# Patient Record
Sex: FEMALE | Race: WHITE | NOT HISPANIC OR LATINO | Employment: UNEMPLOYED | URBAN - NONMETROPOLITAN AREA
[De-identification: names, ages, dates, MRNs, and addresses within clinical notes are randomized per-mention and may not be internally consistent; named-entity substitution may affect disease eponyms.]

---

## 2019-02-25 ENCOUNTER — OFFICE VISIT (OUTPATIENT)
Dept: RETAIL CLINIC | Facility: CLINIC | Age: 64
End: 2019-02-25

## 2019-02-25 VITALS
TEMPERATURE: 99.3 F | RESPIRATION RATE: 16 BRPM | HEART RATE: 92 BPM | WEIGHT: 246.4 LBS | OXYGEN SATURATION: 94 % | SYSTOLIC BLOOD PRESSURE: 130 MMHG | DIASTOLIC BLOOD PRESSURE: 78 MMHG

## 2019-02-25 DIAGNOSIS — J06.9 UPPER RESPIRATORY TRACT INFECTION, UNSPECIFIED TYPE: ICD-10-CM

## 2019-02-25 DIAGNOSIS — J44.0 COPD WITH ACUTE BRONCHITIS (HCC): Primary | ICD-10-CM

## 2019-02-25 DIAGNOSIS — J20.9 COPD WITH ACUTE BRONCHITIS (HCC): Primary | ICD-10-CM

## 2019-02-25 LAB
EXPIRATION DATE: NORMAL
FLUAV AG NPH QL: NEGATIVE
FLUBV AG NPH QL: NEGATIVE
INTERNAL CONTROL: NORMAL
Lab: NORMAL

## 2019-02-25 PROCEDURE — 87804 INFLUENZA ASSAY W/OPTIC: CPT | Performed by: NURSE PRACTITIONER

## 2019-02-25 PROCEDURE — 96372 THER/PROPH/DIAG INJ SC/IM: CPT | Performed by: NURSE PRACTITIONER

## 2019-02-25 PROCEDURE — 94640 AIRWAY INHALATION TREATMENT: CPT | Performed by: NURSE PRACTITIONER

## 2019-02-25 PROCEDURE — 99204 OFFICE O/P NEW MOD 45 MIN: CPT | Performed by: NURSE PRACTITIONER

## 2019-02-25 RX ORDER — PREDNISONE 10 MG/1
20 TABLET ORAL 2 TIMES DAILY
Qty: 20 TABLET | Refills: 5 | Status: ON HOLD | OUTPATIENT
Start: 2019-02-25 | End: 2020-12-22

## 2019-02-25 RX ORDER — METHYLPREDNISOLONE ACETATE 80 MG/ML
80 INJECTION, SUSPENSION INTRA-ARTICULAR; INTRALESIONAL; INTRAMUSCULAR; SOFT TISSUE ONCE
Status: COMPLETED | OUTPATIENT
Start: 2019-02-25 | End: 2019-02-25

## 2019-02-25 RX ORDER — BROMPHENIRAMINE MALEATE, PSEUDOEPHEDRINE HYDROCHLORIDE, AND DEXTROMETHORPHAN HYDROBROMIDE 2; 30; 10 MG/5ML; MG/5ML; MG/5ML
10 SYRUP ORAL 3 TIMES DAILY PRN
Qty: 180 ML | Refills: 0 | Status: ON HOLD | OUTPATIENT
Start: 2019-02-25 | End: 2020-12-22

## 2019-02-25 RX ORDER — ALBUTEROL SULFATE 2.5 MG/3ML
2.5 SOLUTION RESPIRATORY (INHALATION) ONCE
Status: COMPLETED | OUTPATIENT
Start: 2019-02-25 | End: 2019-02-25

## 2019-02-25 RX ORDER — DEXTROMETHORPHAN HYDROBROMIDE AND PROMETHAZINE HYDROCHLORIDE 15; 6.25 MG/5ML; MG/5ML
5 SYRUP ORAL
Qty: 150 ML | Refills: 0 | Status: ON HOLD | OUTPATIENT
Start: 2019-02-25 | End: 2020-12-22

## 2019-02-25 RX ADMIN — METHYLPREDNISOLONE ACETATE 80 MG: 80 INJECTION, SUSPENSION INTRA-ARTICULAR; INTRALESIONAL; INTRAMUSCULAR; SOFT TISSUE at 17:22

## 2019-02-25 RX ADMIN — ALBUTEROL SULFATE 2.5 MG: 2.5 SOLUTION RESPIRATORY (INHALATION) at 17:49

## 2019-02-25 NOTE — PROGRESS NOTES
Leisa presents to the clinic today c/o upper respiratory/Flu like symptoms  which started four days ago. Associated symptoms include congestion, cough with associated wheezing,fatigue, body aches,  decreased appetite and fever/chills for the past 48 hours. She has tried several otc medications without adequate relief. She does work as a Radiology Tech and has had her Influenza vaccination this season.     URI    This is a new problem. The current episode started in the past 7 days. The problem has been gradually worsening. The maximum temperature recorded prior to her arrival was 100.4 - 100.9 F. The fever has been present for 1 to 2 days. Associated symptoms include congestion, coughing, headaches, rhinorrhea and wheezing. Pertinent negatives include no diarrhea, ear pain, nausea, plugged ear sensation, rash, sinus pain, sore throat, swollen glands or vomiting. She has tried inhaler use (OTC Cold/Cough) for the symptoms.    Refer to ROS for additional information.    Vitals:    02/25/19 1715   BP: 130/78   Pulse: 92   Resp: 16   Temp: 99.3 °F (37.4 °C)   SpO2: 94%     The following portions of the patient's history were reviewed and updated as appropriate: allergies, current medications, past family history, past medical history, past social history, past surgical history and problem list.    Review of Systems   Constitutional: Positive for activity change, appetite change, chills, fatigue and fever.   HENT: Positive for congestion and rhinorrhea. Negative for ear pain, sinus pain and sore throat.    Eyes: Negative for discharge and redness.   Respiratory: Positive for cough, chest tightness, shortness of breath and wheezing.    Gastrointestinal: Negative for diarrhea, nausea and vomiting.   Genitourinary: Negative for decreased urine volume.   Musculoskeletal: Positive for myalgias.        Generalized aching and joint discomfort   Skin: Negative for rash.   Neurological: Positive for headaches. Negative for  dizziness and light-headedness.   Hematological: Negative for adenopathy.   Psychiatric/Behavioral: Positive for sleep disturbance.   All other systems reviewed and are negative.    Physical Exam   Constitutional: She is oriented to person, place, and time. She appears well-developed and well-nourished. No distress.   HENT:   Head: Normocephalic.   Right Ear: Ear canal normal. Tympanic membrane is injected.   Left Ear: Ear canal normal. Tympanic membrane is injected.   Nose: Mucosal edema and rhinorrhea present. Right sinus exhibits no maxillary sinus tenderness and no frontal sinus tenderness. Left sinus exhibits no maxillary sinus tenderness and no frontal sinus tenderness.   Mouth/Throat: Mucous membranes are normal. No oropharyngeal exudate or posterior oropharyngeal erythema.   Eyes: Conjunctivae are normal. Pupils are equal, round, and reactive to light. Right eye exhibits discharge. Left eye exhibits no discharge. No scleral icterus.   Neck: Neck supple.   Cardiovascular: Normal rate, regular rhythm and normal heart sounds.   Pulmonary/Chest: Effort normal. No accessory muscle usage or stridor. No respiratory distress. She has wheezes. She has rhonchi. She exhibits tenderness.   Abdominal: Soft. There is no tenderness. There is no guarding and no CVA tenderness.   Lymphadenopathy:     She has no cervical adenopathy.   Neurological: She is alert and oriented to person, place, and time.   Skin: Skin is warm and dry. Capillary refill takes less than 2 seconds.   Psychiatric: She has a normal mood and affect. Her behavior is normal.   Vitals reviewed.    Assessment/Plan   Problems Addressed this Visit     None      Visit Diagnoses     COPD with acute bronchitis (CMS/[facility])    -  Primary    Relevant Medications    methylPREDNISolone acetate (DEPO-medrol) injection 80 mg (Completed)    predniSONE (DELTASONE) 10 MG tablet    promethazine-dextromethorphan (PROMETHAZINE-DM) 6.25-15 MG/5ML syrup     brompheniramine-pseudoephedrine-DM 30-2-10 MG/5ML syrup    albuterol (PROVENTIL) nebulizer solution 0.083% 2.5 mg/3mL (Completed)    Upper respiratory tract infection, unspecified type        Relevant Orders    POC Influenza A / B (Completed)        Findings and recommendations discussed with Leisa. Treatment options reviewed. Counseled regarding supportive care measures and smoking cessation. S/S of concern reviewed and if occur to seek further medical evaluation or if symptoms do not improve within 48-72 hours.   Lab Results   Component Value Date    RAPFLUA Negative 02/25/2019    RAPFLUB Negative 02/25/2019

## 2020-12-15 ENCOUNTER — OFFICE VISIT (OUTPATIENT)
Dept: NEUROSURGERY | Facility: CLINIC | Age: 65
End: 2020-12-15

## 2020-12-15 VITALS — HEIGHT: 67 IN | WEIGHT: 250.2 LBS | BODY MASS INDEX: 39.27 KG/M2 | TEMPERATURE: 97.8 F

## 2020-12-15 DIAGNOSIS — M54.9 MECHANICAL BACK PAIN: Primary | ICD-10-CM

## 2020-12-15 DIAGNOSIS — M47.819 FACET ARTHROPATHY: ICD-10-CM

## 2020-12-15 DIAGNOSIS — M51.36 DDD (DEGENERATIVE DISC DISEASE), LUMBAR: ICD-10-CM

## 2020-12-15 PROCEDURE — 99243 OFF/OP CNSLTJ NEW/EST LOW 30: CPT | Performed by: NEUROLOGICAL SURGERY

## 2020-12-15 RX ORDER — CITALOPRAM 20 MG/1
20 TABLET ORAL DAILY
COMMUNITY

## 2020-12-15 RX ORDER — ATORVASTATIN CALCIUM 80 MG/1
80 TABLET, FILM COATED ORAL DAILY
COMMUNITY

## 2020-12-15 RX ORDER — OMEPRAZOLE 20 MG/1
20 CAPSULE, DELAYED RELEASE ORAL DAILY
COMMUNITY

## 2020-12-15 RX ORDER — ALBUTEROL SULFATE 1.25 MG/3ML
1 SOLUTION RESPIRATORY (INHALATION) EVERY 6 HOURS PRN
Status: ON HOLD | COMMUNITY
End: 2020-12-22

## 2020-12-15 RX ORDER — CARVEDILOL 25 MG/1
25 TABLET ORAL 2 TIMES DAILY WITH MEALS
COMMUNITY

## 2020-12-15 RX ORDER — AMLODIPINE BESYLATE 10 MG/1
10 TABLET ORAL DAILY
COMMUNITY

## 2020-12-15 RX ORDER — ASPIRIN 81 MG/1
81 TABLET, CHEWABLE ORAL DAILY
COMMUNITY

## 2020-12-15 NOTE — PROGRESS NOTES
Patient: Leisa Vasquez  : 1955    Primary Care Provider: Jasper Lopez MD    Requesting Provider: As above        History    Chief Complaint: Mid and low back pain involving the right hip.    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  In May of this year she was moving a heavy patient when she developed right hip pain and dragging of her right foot.  The foot dragging has improved.  The right hip pain is ongoing.  She has started to get some weakness in her left hip.  She does not really describe radicular spread of her symptoms.  She has urinary frequency but no katey bowel or bladder dysfunction.  She has no numbness.  She is worse with standing, walking, bending.  Sitting immobile is helpful.  She has undergone chiropractic treatment.    Review of Systems   Constitutional: Positive for activity change, appetite change, fatigue and unexpected weight change. Negative for chills, diaphoresis and fever.   HENT: Negative for congestion, dental problem, drooling, ear discharge, ear pain, facial swelling, hearing loss, mouth sores, nosebleeds, postnasal drip, rhinorrhea, sinus pressure, sinus pain, sneezing, sore throat, tinnitus, trouble swallowing and voice change.    Eyes: Negative for photophobia, pain, discharge, redness, itching and visual disturbance.   Respiratory: Negative for apnea, cough, choking, chest tightness, shortness of breath, wheezing and stridor.    Cardiovascular: Negative for chest pain, palpitations and leg swelling.   Gastrointestinal: Negative for abdominal distention, abdominal pain, anal bleeding, blood in stool, constipation, diarrhea, nausea, rectal pain and vomiting.   Endocrine: Negative for cold intolerance, heat intolerance, polydipsia, polyphagia and polyuria.   Genitourinary: Positive for frequency. Negative for decreased urine volume, difficulty urinating,  "dyspareunia, dysuria, enuresis, flank pain, genital sores, hematuria, menstrual problem, pelvic pain, urgency, vaginal bleeding, vaginal discharge and vaginal pain.   Musculoskeletal: Positive for back pain and gait problem. Negative for arthralgias, joint swelling, myalgias, neck pain and neck stiffness.   Skin: Negative for color change, pallor, rash and wound.   Allergic/Immunologic: Negative for environmental allergies, food allergies and immunocompromised state.   Neurological: Positive for dizziness. Negative for tremors, seizures, syncope, facial asymmetry, speech difficulty, weakness, light-headedness, numbness and headaches.   Hematological: Negative for adenopathy. Does not bruise/bleed easily.   Psychiatric/Behavioral: Negative for agitation, behavioral problems, confusion, decreased concentration, dysphoric mood, hallucinations, self-injury, sleep disturbance and suicidal ideas. The patient is not nervous/anxious and is not hyperactive.        The patient's past medical history, past surgical history, family history, and social history have been reviewed at length in the electronic medical record.    Physical Exam:   Temp 97.8 °F (36.6 °C)   Ht 170.2 cm (67\")   Wt 113 kg (250 lb 3.2 oz)   BMI 39.19 kg/m²   CONSTITUTIONAL: Patient is well-nourished, pleasant and appears stated age.  CV: Heart regular rate and rhythm without murmur, rub, or gallop.  PULMONARY: Lungs are clear to ascultation.  MUSCULOSKELETAL:  Straight leg raising is negative.  Wilfred's Sign is negative on the right and positive on the left.  ROM in back is limited in all directions.  Tenderness in the back to palpation is present several fingerbreadths right of the midline in the lumbar region.  NEUROLOGICAL:  Orientation, memory, attention span, language function, and cognition have been examined and are intact.  Strength is intact in the lower extremities to direct testing.  Muscle tone is normal throughout.  Station and gait are " normal.  Sensation is intact to light touch testing throughout.  Deep tendon reflexes are 1+ and symmetrical.  Coordination is intact.      Medical Decision Making    Data Review:   MRI of the lumbar spine dated 6/5/2020 demonstrates some diffuse degenerative disc disease and facet arthropathy.  Modic endplate changes are noted at L1-2.  There is moderate stenosis at L1-2.  Recess narrowing is noted at other levels.    Right hip x-rays were said to show some osteoarthritis.    Diagnosis:   1.  Mechanical back pain.  2.  Right hip pain of uncertain etiology.  3.  Morbid obesity.    Treatment Options:   The patient has mechanical low back pain due to extensive degenerative disc disease and degenerative joint disease.  Currently, I do not see a role for surgical intervention on her back and treatment will need to remain symptomatic.       Diagnosis Plan   1. Mechanical back pain     2. Facet arthropathy     3. DDD (degenerative disc disease), lumbar         Scribed for Francis Ricketts MD by SERA De La Paz 12/15/2020 14:27 EST      I, Dr. Ricketts, personally performed the services described in the documentation, as scribed in my presence, and it is both accurate and complete.

## 2020-12-16 ENCOUNTER — TELEPHONE (OUTPATIENT)
Dept: NEUROSURGERY | Facility: CLINIC | Age: 65
End: 2020-12-16

## 2020-12-16 NOTE — TELEPHONE ENCOUNTER
"Provider:  Jamarcus  Caller: self  Time of call:  09:41   Phone #:  [phone]  Surgery:  none  Surgery Date:  none  Last visit:   12/15/2020  Next visit: none    Reason for call:       Patient left message in regards to her appointment yesterday with Dr Ricketts.  She reports that when Dr Ricketts asked if she had any bowel or bladder problems she responded \"no\" out of embarrassment.    In her message she stated stated that she actually has had bowel/bladder problems for the last couple of months.  She wanted to make Dr Ricketts aware.           "

## 2020-12-16 NOTE — TELEPHONE ENCOUNTER
I called and spoke with patient.  She is describing urinary urgency.  She feels the urge to urinate, however does not make it to the restroom in time.  This has been going on for approximately 6-7 months and she wears a depends daily.  She has been embarrassed about the situation and will seek out medical advice from her PCP's office.

## 2020-12-21 ENCOUNTER — HOSPITAL ENCOUNTER (OUTPATIENT)
Facility: HOSPITAL | Age: 65
Setting detail: OBSERVATION
Discharge: HOME | End: 2020-12-23
Attending: STUDENT IN AN ORGANIZED HEALTH CARE EDUCATION/TRAINING PROGRAM | Admitting: INTERNAL MEDICINE

## 2020-12-21 ENCOUNTER — APPOINTMENT (OUTPATIENT)
Dept: GENERAL RADIOLOGY | Facility: HOSPITAL | Age: 65
End: 2020-12-21

## 2020-12-21 DIAGNOSIS — I26.99 PULMONARY EMBOLISM, UNSPECIFIED CHRONICITY, UNSPECIFIED PULMONARY EMBOLISM TYPE, UNSPECIFIED WHETHER ACUTE COR PULMONALE PRESENT (HCC): Primary | ICD-10-CM

## 2020-12-21 LAB
A-A DO2: 39.1 MMHG (ref 0–300)
ALBUMIN SERPL-MCNC: 3.43 G/DL (ref 3.5–5.2)
ALBUMIN/GLOB SERPL: 0.9 G/DL
ALP SERPL-CCNC: 92 U/L (ref 39–117)
ALT SERPL W P-5'-P-CCNC: 17 U/L (ref 1–33)
ANION GAP SERPL CALCULATED.3IONS-SCNC: 9.7 MMOL/L (ref 5–15)
ARTERIAL PATENCY WRIST A: ABNORMAL
AST SERPL-CCNC: 15 U/L (ref 1–32)
ATMOSPHERIC PRESS: 726 MMHG
BASE EXCESS BLDA CALC-SCNC: 1.8 MMOL/L (ref 0–2)
BASOPHILS # BLD AUTO: 0.09 10*3/MM3 (ref 0–0.2)
BASOPHILS NFR BLD AUTO: 0.6 % (ref 0–1.5)
BDY SITE: ABNORMAL
BILIRUB SERPL-MCNC: 1.3 MG/DL (ref 0–1.2)
BILIRUB UR QL STRIP: NEGATIVE
BODY TEMPERATURE: 0 C
BUN SERPL-MCNC: 22 MG/DL (ref 8–23)
BUN/CREAT SERPL: 31 (ref 7–25)
CALCIUM SPEC-SCNC: 9.6 MG/DL (ref 8.6–10.5)
CHLORIDE SERPL-SCNC: 101 MMOL/L (ref 98–107)
CK SERPL-CCNC: 63 U/L (ref 20–180)
CLARITY UR: CLEAR
CO2 BLDA-SCNC: 28.2 MMOL/L (ref 22–33)
CO2 SERPL-SCNC: 22.3 MMOL/L (ref 22–29)
COHGB MFR BLD: 2.7 % (ref 0–5)
COLOR UR: ABNORMAL
CREAT SERPL-MCNC: 0.71 MG/DL (ref 0.57–1)
CRP SERPL-MCNC: 8.83 MG/DL (ref 0–0.5)
D-LACTATE SERPL-SCNC: 1.2 MMOL/L (ref 0.5–2)
DEPRECATED RDW RBC AUTO: 42.4 FL (ref 37–54)
EOSINOPHIL # BLD AUTO: 0.06 10*3/MM3 (ref 0–0.4)
EOSINOPHIL NFR BLD AUTO: 0.4 % (ref 0.3–6.2)
ERYTHROCYTE [DISTWIDTH] IN BLOOD BY AUTOMATED COUNT: 11.9 % (ref 12.3–15.4)
ERYTHROCYTE [SEDIMENTATION RATE] IN BLOOD: 30 MM/HR (ref 0–30)
FLUAV RNA RESP QL NAA+PROBE: NOT DETECTED
FLUBV RNA RESP QL NAA+PROBE: NOT DETECTED
GFR SERPL CREATININE-BSD FRML MDRD: 83 ML/MIN/1.73
GLOBULIN UR ELPH-MCNC: 3.8 GM/DL
GLUCOSE SERPL-MCNC: 94 MG/DL (ref 65–99)
GLUCOSE UR STRIP-MCNC: NEGATIVE MG/DL
HCO3 BLDA-SCNC: 26.9 MMOL/L (ref 20–26)
HCT VFR BLD AUTO: 43.3 % (ref 34–46.6)
HCT VFR BLD CALC: 41.9 % (ref 38–51)
HGB BLD-MCNC: 14 G/DL (ref 12–15.9)
HGB BLDA-MCNC: 13.7 G/DL (ref 13.5–17.5)
HGB UR QL STRIP.AUTO: NEGATIVE
IMM GRANULOCYTES # BLD AUTO: 0.06 10*3/MM3 (ref 0–0.05)
IMM GRANULOCYTES NFR BLD AUTO: 0.4 % (ref 0–0.5)
INHALED O2 CONCENTRATION: 21 %
INR PPP: 1.01 (ref 0.9–1.1)
KETONES UR QL STRIP: ABNORMAL
LEUKOCYTE ESTERASE UR QL STRIP.AUTO: NEGATIVE
LIPASE SERPL-CCNC: 18 U/L (ref 13–60)
LYMPHOCYTES # BLD AUTO: 3.75 10*3/MM3 (ref 0.7–3.1)
LYMPHOCYTES NFR BLD AUTO: 23.4 % (ref 19.6–45.3)
Lab: ABNORMAL
MAGNESIUM SERPL-MCNC: 1.6 MG/DL (ref 1.6–2.4)
MCH RBC QN AUTO: 31.2 PG (ref 26.6–33)
MCHC RBC AUTO-ENTMCNC: 32.3 G/DL (ref 31.5–35.7)
MCV RBC AUTO: 96.4 FL (ref 79–97)
METHGB BLD QL: 0.1 % (ref 0–3)
MODALITY: ABNORMAL
MONOCYTES # BLD AUTO: 1.52 10*3/MM3 (ref 0.1–0.9)
MONOCYTES NFR BLD AUTO: 9.5 % (ref 5–12)
NEUTROPHILS NFR BLD AUTO: 10.55 10*3/MM3 (ref 1.7–7)
NEUTROPHILS NFR BLD AUTO: 65.7 % (ref 42.7–76)
NITRITE UR QL STRIP: NEGATIVE
NOTE: ABNORMAL
NRBC BLD AUTO-RTO: 0 /100 WBC (ref 0–0.2)
NT-PROBNP SERPL-MCNC: 77.9 PG/ML (ref 0–900)
OXYHGB MFR BLDV: 87 % (ref 94–99)
PCO2 BLDA: 42.8 MM HG (ref 35–45)
PCO2 TEMP ADJ BLD: ABNORMAL MM[HG]
PH BLDA: 7.41 PH UNITS (ref 7.35–7.45)
PH UR STRIP.AUTO: <=5 [PH] (ref 5–8)
PH, TEMP CORRECTED: ABNORMAL
PHOSPHATE SERPL-MCNC: 3.1 MG/DL (ref 2.5–4.5)
PLATELET # BLD AUTO: 184 10*3/MM3 (ref 140–450)
PMV BLD AUTO: 10.4 FL (ref 6–12)
PO2 BLDA: 55.2 MM HG (ref 83–108)
PO2 TEMP ADJ BLD: ABNORMAL MM[HG]
POTASSIUM SERPL-SCNC: 3.8 MMOL/L (ref 3.5–5.2)
PROT SERPL-MCNC: 7.2 G/DL (ref 6–8.5)
PROT UR QL STRIP: ABNORMAL
PROTHROMBIN TIME: 13.1 SECONDS (ref 11.9–14.1)
RBC # BLD AUTO: 4.49 10*6/MM3 (ref 3.77–5.28)
SAO2 % BLDCOA: 89.5 % (ref 94–99)
SARS-COV-2 RNA RESP QL NAA+PROBE: NOT DETECTED
SODIUM SERPL-SCNC: 133 MMOL/L (ref 136–145)
SP GR UR STRIP: 1.02 (ref 1–1.03)
TROPONIN T SERPL-MCNC: <0.01 NG/ML (ref 0–0.03)
TSH SERPL DL<=0.05 MIU/L-ACNC: 2.57 UIU/ML (ref 0.27–4.2)
UROBILINOGEN UR QL STRIP: ABNORMAL
VENTILATOR MODE: ABNORMAL
WBC # BLD AUTO: 16.03 10*3/MM3 (ref 3.4–10.8)

## 2020-12-21 PROCEDURE — 84100 ASSAY OF PHOSPHORUS: CPT | Performed by: STUDENT IN AN ORGANIZED HEALTH CARE EDUCATION/TRAINING PROGRAM

## 2020-12-21 PROCEDURE — 85652 RBC SED RATE AUTOMATED: CPT | Performed by: STUDENT IN AN ORGANIZED HEALTH CARE EDUCATION/TRAINING PROGRAM

## 2020-12-21 PROCEDURE — 96361 HYDRATE IV INFUSION ADD-ON: CPT

## 2020-12-21 PROCEDURE — 82805 BLOOD GASES W/O2 SATURATION: CPT

## 2020-12-21 PROCEDURE — 82375 ASSAY CARBOXYHB QUANT: CPT

## 2020-12-21 PROCEDURE — 99284 EMERGENCY DEPT VISIT MOD MDM: CPT

## 2020-12-21 PROCEDURE — 84443 ASSAY THYROID STIM HORMONE: CPT | Performed by: STUDENT IN AN ORGANIZED HEALTH CARE EDUCATION/TRAINING PROGRAM

## 2020-12-21 PROCEDURE — 93005 ELECTROCARDIOGRAM TRACING: CPT | Performed by: STUDENT IN AN ORGANIZED HEALTH CARE EDUCATION/TRAINING PROGRAM

## 2020-12-21 PROCEDURE — 83605 ASSAY OF LACTIC ACID: CPT | Performed by: STUDENT IN AN ORGANIZED HEALTH CARE EDUCATION/TRAINING PROGRAM

## 2020-12-21 PROCEDURE — 71045 X-RAY EXAM CHEST 1 VIEW: CPT

## 2020-12-21 PROCEDURE — 83036 HEMOGLOBIN GLYCOSYLATED A1C: CPT | Performed by: HOSPITALIST

## 2020-12-21 PROCEDURE — 96375 TX/PRO/DX INJ NEW DRUG ADDON: CPT

## 2020-12-21 PROCEDURE — 81003 URINALYSIS AUTO W/O SCOPE: CPT | Performed by: STUDENT IN AN ORGANIZED HEALTH CARE EDUCATION/TRAINING PROGRAM

## 2020-12-21 PROCEDURE — 85610 PROTHROMBIN TIME: CPT | Performed by: STUDENT IN AN ORGANIZED HEALTH CARE EDUCATION/TRAINING PROGRAM

## 2020-12-21 PROCEDURE — 83735 ASSAY OF MAGNESIUM: CPT | Performed by: STUDENT IN AN ORGANIZED HEALTH CARE EDUCATION/TRAINING PROGRAM

## 2020-12-21 PROCEDURE — 83880 ASSAY OF NATRIURETIC PEPTIDE: CPT | Performed by: STUDENT IN AN ORGANIZED HEALTH CARE EDUCATION/TRAINING PROGRAM

## 2020-12-21 PROCEDURE — C9803 HOPD COVID-19 SPEC COLLECT: HCPCS

## 2020-12-21 PROCEDURE — 82550 ASSAY OF CK (CPK): CPT | Performed by: STUDENT IN AN ORGANIZED HEALTH CARE EDUCATION/TRAINING PROGRAM

## 2020-12-21 PROCEDURE — 80053 COMPREHEN METABOLIC PANEL: CPT | Performed by: STUDENT IN AN ORGANIZED HEALTH CARE EDUCATION/TRAINING PROGRAM

## 2020-12-21 PROCEDURE — 87636 SARSCOV2 & INF A&B AMP PRB: CPT | Performed by: STUDENT IN AN ORGANIZED HEALTH CARE EDUCATION/TRAINING PROGRAM

## 2020-12-21 PROCEDURE — 36600 WITHDRAWAL OF ARTERIAL BLOOD: CPT

## 2020-12-21 PROCEDURE — 93005 ELECTROCARDIOGRAM TRACING: CPT | Performed by: EMERGENCY MEDICINE

## 2020-12-21 PROCEDURE — 93010 ELECTROCARDIOGRAM REPORT: CPT | Performed by: INTERNAL MEDICINE

## 2020-12-21 PROCEDURE — 86140 C-REACTIVE PROTEIN: CPT | Performed by: STUDENT IN AN ORGANIZED HEALTH CARE EDUCATION/TRAINING PROGRAM

## 2020-12-21 PROCEDURE — 84484 ASSAY OF TROPONIN QUANT: CPT | Performed by: STUDENT IN AN ORGANIZED HEALTH CARE EDUCATION/TRAINING PROGRAM

## 2020-12-21 PROCEDURE — 83050 HGB METHEMOGLOBIN QUAN: CPT

## 2020-12-21 PROCEDURE — 83690 ASSAY OF LIPASE: CPT | Performed by: STUDENT IN AN ORGANIZED HEALTH CARE EDUCATION/TRAINING PROGRAM

## 2020-12-21 PROCEDURE — 85025 COMPLETE CBC W/AUTO DIFF WBC: CPT | Performed by: STUDENT IN AN ORGANIZED HEALTH CARE EDUCATION/TRAINING PROGRAM

## 2020-12-21 PROCEDURE — 25010000002 MORPHINE PER 10 MG: Performed by: STUDENT IN AN ORGANIZED HEALTH CARE EDUCATION/TRAINING PROGRAM

## 2020-12-21 RX ORDER — MORPHINE SULFATE 2 MG/ML
2 INJECTION, SOLUTION INTRAMUSCULAR; INTRAVENOUS ONCE
Status: COMPLETED | OUTPATIENT
Start: 2020-12-21 | End: 2020-12-21

## 2020-12-21 RX ORDER — SODIUM CHLORIDE 9 MG/ML
75 INJECTION, SOLUTION INTRAVENOUS CONTINUOUS
Status: DISCONTINUED | OUTPATIENT
Start: 2020-12-21 | End: 2020-12-22

## 2020-12-21 RX ORDER — ASPIRIN 81 MG/1
324 TABLET, CHEWABLE ORAL ONCE
Status: COMPLETED | OUTPATIENT
Start: 2020-12-21 | End: 2020-12-21

## 2020-12-21 RX ORDER — HYDROCODONE BITARTRATE AND ACETAMINOPHEN 5; 325 MG/1; MG/1
1 TABLET ORAL ONCE
Status: COMPLETED | OUTPATIENT
Start: 2020-12-21 | End: 2020-12-21

## 2020-12-21 RX ADMIN — SODIUM CHLORIDE 125 ML/HR: 9 INJECTION, SOLUTION INTRAVENOUS at 21:50

## 2020-12-21 RX ADMIN — ASPIRIN 324 MG: 81 TABLET, CHEWABLE ORAL at 21:45

## 2020-12-21 RX ADMIN — HYDROCODONE BITARTRATE AND ACETAMINOPHEN 1 TABLET: 5; 325 TABLET ORAL at 23:49

## 2020-12-21 RX ADMIN — MORPHINE SULFATE 2 MG: 2 INJECTION, SOLUTION INTRAMUSCULAR; INTRAVENOUS at 21:46

## 2020-12-21 RX ADMIN — SODIUM CHLORIDE 500 ML: 9 INJECTION, SOLUTION INTRAVENOUS at 21:49

## 2020-12-21 NOTE — ED NOTES
PT DENIES ANY FALL OR INJURY, STATES PAIN INCREASES WITH ANY KIND OF MOVEMENT     Whitney Garcia, RN  12/21/20 9643

## 2020-12-22 ENCOUNTER — APPOINTMENT (OUTPATIENT)
Dept: CT IMAGING | Facility: HOSPITAL | Age: 65
End: 2020-12-22

## 2020-12-22 ENCOUNTER — APPOINTMENT (OUTPATIENT)
Dept: CARDIOLOGY | Facility: HOSPITAL | Age: 65
End: 2020-12-22

## 2020-12-22 ENCOUNTER — APPOINTMENT (OUTPATIENT)
Dept: ULTRASOUND IMAGING | Facility: HOSPITAL | Age: 65
End: 2020-12-22

## 2020-12-22 PROBLEM — D75.89 MACROCYTOSIS: Status: ACTIVE | Noted: 2020-12-22

## 2020-12-22 PROBLEM — D72.829 LEUKOCYTOSIS: Status: ACTIVE | Noted: 2020-12-22

## 2020-12-22 PROBLEM — E11.9 T2DM (TYPE 2 DIABETES MELLITUS) (HCC): Chronic | Status: ACTIVE | Noted: 2020-12-22

## 2020-12-22 PROBLEM — Z72.0 TOBACCO ABUSE: Chronic | Status: ACTIVE | Noted: 2020-12-22

## 2020-12-22 PROBLEM — E66.9 OBESITY (BMI 30-39.9): Chronic | Status: ACTIVE | Noted: 2020-12-22

## 2020-12-22 PROBLEM — M19.90 OSTEOARTHRITIS: Chronic | Status: ACTIVE | Noted: 2020-12-22

## 2020-12-22 PROBLEM — M50.30 DEGENERATIVE DISC DISEASE, CERVICAL: Status: ACTIVE | Noted: 2020-12-22

## 2020-12-22 PROBLEM — J96.01 ACUTE RESPIRATORY FAILURE WITH HYPOXIA: Status: ACTIVE | Noted: 2020-12-22

## 2020-12-22 PROBLEM — I25.10 CORONARY ARTERY DISEASE: Chronic | Status: ACTIVE | Noted: 2020-12-22

## 2020-12-22 PROBLEM — J44.9 COPD (CHRONIC OBSTRUCTIVE PULMONARY DISEASE): Chronic | Status: ACTIVE | Noted: 2020-12-22

## 2020-12-22 PROBLEM — I10 ESSENTIAL HYPERTENSION: Chronic | Status: ACTIVE | Noted: 2020-12-22

## 2020-12-22 PROBLEM — I26.99 PULMONARY EMBOLISM (HCC): Status: ACTIVE | Noted: 2020-12-22

## 2020-12-22 PROBLEM — E78.5 HYPERLIPIDEMIA: Chronic | Status: ACTIVE | Noted: 2020-12-22

## 2020-12-22 LAB
A-A DO2: 69.8 MMHG (ref 0–300)
ALBUMIN SERPL-MCNC: 3.5 G/DL (ref 3.5–5.2)
ALBUMIN/GLOB SERPL: 0.9 G/DL
ALP SERPL-CCNC: 100 U/L (ref 39–117)
ALT SERPL W P-5'-P-CCNC: 31 U/L (ref 1–33)
ANION GAP SERPL CALCULATED.3IONS-SCNC: 12.3 MMOL/L (ref 5–15)
ARTERIAL PATENCY WRIST A: ABNORMAL
AST SERPL-CCNC: 29 U/L (ref 1–32)
ATMOSPHERIC PRESS: 728 MMHG
BASE EXCESS BLDA CALC-SCNC: 0.1 MMOL/L (ref 0–2)
BASOPHILS # BLD AUTO: 0.12 10*3/MM3 (ref 0–0.2)
BASOPHILS NFR BLD AUTO: 0.9 % (ref 0–1.5)
BDY SITE: ABNORMAL
BH CV ECHO MEAS - ACS: 1.5 CM
BH CV ECHO MEAS - AO ROOT AREA (BSA CORRECTED): 1.2
BH CV ECHO MEAS - AO ROOT AREA: 5.9 CM^2
BH CV ECHO MEAS - AO ROOT DIAM: 2.8 CM
BH CV ECHO MEAS - BSA(HAYCOCK): 2.3 M^2
BH CV ECHO MEAS - BSA: 2.2 M^2
BH CV ECHO MEAS - BZI_BMI: 38.2 KILOGRAMS/M^2
BH CV ECHO MEAS - BZI_METRIC_HEIGHT: 170.2 CM
BH CV ECHO MEAS - BZI_METRIC_WEIGHT: 110.7 KG
BH CV ECHO MEAS - EDV(MOD-SP4): 33.9 ML
BH CV ECHO MEAS - EF(MOD-SP4): 42.8 %
BH CV ECHO MEAS - ESV(MOD-SP4): 19.4 ML
BH CV ECHO MEAS - LA DIMENSION: 3.9 CM
BH CV ECHO MEAS - LA/AO: 1.4
BH CV ECHO MEAS - LV DIASTOLIC VOL/BSA (35-75): 15.4 ML/M^2
BH CV ECHO MEAS - LV SYSTOLIC VOL/BSA (12-30): 8.8 ML/M^2
BH CV ECHO MEAS - LVLD AP4: 6.9 CM
BH CV ECHO MEAS - LVLS AP4: 6.7 CM
BH CV ECHO MEAS - LVOT AREA (M): 2.3 CM^2
BH CV ECHO MEAS - LVOT AREA: 2.3 CM^2
BH CV ECHO MEAS - LVOT DIAM: 1.7 CM
BH CV ECHO MEAS - MV A MAX VEL: 107 CM/SEC
BH CV ECHO MEAS - MV E MAX VEL: 87 CM/SEC
BH CV ECHO MEAS - MV E/A: 0.81
BH CV ECHO MEAS - PA ACC TIME: 0.09 SEC
BH CV ECHO MEAS - PA PR(ACCEL): 40.8 MMHG
BH CV ECHO MEAS - RAP SYSTOLE: 10 MMHG
BH CV ECHO MEAS - RVSP: 33.6 MMHG
BH CV ECHO MEAS - SI(MOD-SP4): 6.6 ML/M^2
BH CV ECHO MEAS - SV(MOD-SP4): 14.5 ML
BH CV ECHO MEAS - TR MAX VEL: 243 CM/SEC
BILIRUB SERPL-MCNC: 2 MG/DL (ref 0–1.2)
BODY TEMPERATURE: 0 C
BUN SERPL-MCNC: 22 MG/DL (ref 8–23)
BUN/CREAT SERPL: 30.6 (ref 7–25)
CALCIUM SPEC-SCNC: 9.7 MG/DL (ref 8.6–10.5)
CHLORIDE SERPL-SCNC: 99 MMOL/L (ref 98–107)
CO2 BLDA-SCNC: 28.2 MMOL/L (ref 22–33)
CO2 SERPL-SCNC: 24.7 MMOL/L (ref 22–29)
COHGB MFR BLD: 2 % (ref 0–5)
CREAT SERPL-MCNC: 0.72 MG/DL (ref 0.57–1)
CRP SERPL-MCNC: 7.81 MG/DL (ref 0–0.5)
DEPRECATED RDW RBC AUTO: 44.7 FL (ref 37–54)
EOSINOPHIL # BLD AUTO: 0.09 10*3/MM3 (ref 0–0.4)
EOSINOPHIL NFR BLD AUTO: 0.7 % (ref 0.3–6.2)
ERYTHROCYTE [DISTWIDTH] IN BLOOD BY AUTOMATED COUNT: 12 % (ref 12.3–15.4)
GFR SERPL CREATININE-BSD FRML MDRD: 82 ML/MIN/1.73
GLOBULIN UR ELPH-MCNC: 4 GM/DL
GLUCOSE BLDC GLUCOMTR-MCNC: 116 MG/DL (ref 70–130)
GLUCOSE BLDC GLUCOMTR-MCNC: 132 MG/DL (ref 70–130)
GLUCOSE BLDC GLUCOMTR-MCNC: 141 MG/DL (ref 70–130)
GLUCOSE SERPL-MCNC: 108 MG/DL (ref 65–99)
HBA1C MFR BLD: 6.6 % (ref 4.8–5.6)
HCO3 BLDA-SCNC: 26.6 MMOL/L (ref 20–26)
HCT VFR BLD AUTO: 43 % (ref 34–46.6)
HCT VFR BLD CALC: 42 % (ref 38–51)
HGB BLD-MCNC: 13.3 G/DL (ref 12–15.9)
HGB BLDA-MCNC: 13.7 G/DL (ref 13.5–17.5)
IMM GRANULOCYTES # BLD AUTO: 0.06 10*3/MM3 (ref 0–0.05)
IMM GRANULOCYTES NFR BLD AUTO: 0.5 % (ref 0–0.5)
INHALED O2 CONCENTRATION: 28 %
LYMPHOCYTES # BLD AUTO: 2.78 10*3/MM3 (ref 0.7–3.1)
LYMPHOCYTES NFR BLD AUTO: 22 % (ref 19.6–45.3)
Lab: ABNORMAL
MAXIMAL PREDICTED HEART RATE: 157 BPM
MCH RBC QN AUTO: 31 PG (ref 26.6–33)
MCHC RBC AUTO-ENTMCNC: 30.9 G/DL (ref 31.5–35.7)
MCV RBC AUTO: 100.2 FL (ref 79–97)
METHGB BLD QL: 0 % (ref 0–3)
MODALITY: ABNORMAL
MONOCYTES # BLD AUTO: 1.45 10*3/MM3 (ref 0.1–0.9)
MONOCYTES NFR BLD AUTO: 11.5 % (ref 5–12)
NEUTROPHILS NFR BLD AUTO: 64.4 % (ref 42.7–76)
NEUTROPHILS NFR BLD AUTO: 8.15 10*3/MM3 (ref 1.7–7)
NOTE: ABNORMAL
NRBC BLD AUTO-RTO: 0 /100 WBC (ref 0–0.2)
OXYHGB MFR BLDV: 90.5 % (ref 94–99)
PCO2 BLDA: 49.6 MM HG (ref 35–45)
PCO2 TEMP ADJ BLD: ABNORMAL MM[HG]
PH BLDA: 7.34 PH UNITS (ref 7.35–7.45)
PH, TEMP CORRECTED: ABNORMAL
PLATELET # BLD AUTO: 171 10*3/MM3 (ref 140–450)
PMV BLD AUTO: 11 FL (ref 6–12)
PO2 BLDA: 65.6 MM HG (ref 83–108)
PO2 TEMP ADJ BLD: ABNORMAL MM[HG]
POTASSIUM SERPL-SCNC: 3.5 MMOL/L (ref 3.5–5.2)
POTASSIUM SERPL-SCNC: 4.1 MMOL/L (ref 3.5–5.2)
PROT SERPL-MCNC: 7.5 G/DL (ref 6–8.5)
RBC # BLD AUTO: 4.29 10*6/MM3 (ref 3.77–5.28)
SAO2 % BLDCOA: 92.3 % (ref 94–99)
SODIUM SERPL-SCNC: 136 MMOL/L (ref 136–145)
STRESS TARGET HR: 133 BPM
TROPONIN T SERPL-MCNC: <0.01 NG/ML (ref 0–0.03)
VENTILATOR MODE: ABNORMAL
WBC # BLD AUTO: 12.65 10*3/MM3 (ref 3.4–10.8)

## 2020-12-22 PROCEDURE — 93970 EXTREMITY STUDY: CPT | Performed by: RADIOLOGY

## 2020-12-22 PROCEDURE — 96361 HYDRATE IV INFUSION ADD-ON: CPT

## 2020-12-22 PROCEDURE — 85025 COMPLETE CBC W/AUTO DIFF WBC: CPT | Performed by: HOSPITALIST

## 2020-12-22 PROCEDURE — 96365 THER/PROPH/DIAG IV INF INIT: CPT

## 2020-12-22 PROCEDURE — 86140 C-REACTIVE PROTEIN: CPT | Performed by: HOSPITALIST

## 2020-12-22 PROCEDURE — 84484 ASSAY OF TROPONIN QUANT: CPT | Performed by: STUDENT IN AN ORGANIZED HEALTH CARE EDUCATION/TRAINING PROGRAM

## 2020-12-22 PROCEDURE — 96372 THER/PROPH/DIAG INJ SC/IM: CPT

## 2020-12-22 PROCEDURE — G0378 HOSPITAL OBSERVATION PER HR: HCPCS

## 2020-12-22 PROCEDURE — 99220 PR INITIAL OBSERVATION CARE/DAY 70 MINUTES: CPT | Performed by: HOSPITALIST

## 2020-12-22 PROCEDURE — 82805 BLOOD GASES W/O2 SATURATION: CPT

## 2020-12-22 PROCEDURE — 70450 CT HEAD/BRAIN W/O DYE: CPT

## 2020-12-22 PROCEDURE — 25010000002 MAGNESIUM SULFATE IN D5W 1G/100ML (PREMIX) 1-5 GM/100ML-% SOLUTION: Performed by: PHYSICIAN ASSISTANT

## 2020-12-22 PROCEDURE — 94799 UNLISTED PULMONARY SVC/PX: CPT

## 2020-12-22 PROCEDURE — 96376 TX/PRO/DX INJ SAME DRUG ADON: CPT

## 2020-12-22 PROCEDURE — 93306 TTE W/DOPPLER COMPLETE: CPT | Performed by: SPECIALIST

## 2020-12-22 PROCEDURE — 93306 TTE W/DOPPLER COMPLETE: CPT

## 2020-12-22 PROCEDURE — 83050 HGB METHEMOGLOBIN QUAN: CPT

## 2020-12-22 PROCEDURE — 0 IOPAMIDOL PER 1 ML: Performed by: STUDENT IN AN ORGANIZED HEALTH CARE EDUCATION/TRAINING PROGRAM

## 2020-12-22 PROCEDURE — 73200 CT UPPER EXTREMITY W/O DYE: CPT

## 2020-12-22 PROCEDURE — 81241 F5 GENE: CPT | Performed by: PHYSICIAN ASSISTANT

## 2020-12-22 PROCEDURE — 36600 WITHDRAWAL OF ARTERIAL BLOOD: CPT

## 2020-12-22 PROCEDURE — 80053 COMPREHEN METABOLIC PANEL: CPT | Performed by: HOSPITALIST

## 2020-12-22 PROCEDURE — 94640 AIRWAY INHALATION TREATMENT: CPT

## 2020-12-22 PROCEDURE — 71275 CT ANGIOGRAPHY CHEST: CPT

## 2020-12-22 PROCEDURE — 36415 COLL VENOUS BLD VENIPUNCTURE: CPT

## 2020-12-22 PROCEDURE — 84132 ASSAY OF SERUM POTASSIUM: CPT | Performed by: INTERNAL MEDICINE

## 2020-12-22 PROCEDURE — 82962 GLUCOSE BLOOD TEST: CPT

## 2020-12-22 PROCEDURE — 93970 EXTREMITY STUDY: CPT

## 2020-12-22 PROCEDURE — 25010000002 ENOXAPARIN PER 10 MG: Performed by: HOSPITALIST

## 2020-12-22 PROCEDURE — 72125 CT NECK SPINE W/O DYE: CPT

## 2020-12-22 PROCEDURE — 82375 ASSAY CARBOXYHB QUANT: CPT

## 2020-12-22 PROCEDURE — 81240 F2 GENE: CPT | Performed by: PHYSICIAN ASSISTANT

## 2020-12-22 RX ORDER — ASPIRIN 81 MG/1
81 TABLET, CHEWABLE ORAL DAILY
Status: DISCONTINUED | OUTPATIENT
Start: 2020-12-23 | End: 2020-12-23 | Stop reason: HOSPADM

## 2020-12-22 RX ORDER — NITROGLYCERIN 0.4 MG/1
0.4 TABLET SUBLINGUAL
Status: CANCELLED | OUTPATIENT
Start: 2020-12-22

## 2020-12-22 RX ORDER — NICOTINE POLACRILEX 4 MG
15 LOZENGE BUCCAL
Status: DISCONTINUED | OUTPATIENT
Start: 2020-12-22 | End: 2020-12-23 | Stop reason: HOSPADM

## 2020-12-22 RX ORDER — CARVEDILOL 25 MG/1
25 TABLET ORAL 2 TIMES DAILY WITH MEALS
Status: DISCONTINUED | OUTPATIENT
Start: 2020-12-22 | End: 2020-12-23 | Stop reason: HOSPADM

## 2020-12-22 RX ORDER — IBUPROFEN 800 MG/1
800 TABLET ORAL EVERY 8 HOURS PRN
Status: CANCELLED | OUTPATIENT
Start: 2020-12-22

## 2020-12-22 RX ORDER — ATORVASTATIN CALCIUM 40 MG/1
80 TABLET, FILM COATED ORAL NIGHTLY
Status: DISCONTINUED | OUTPATIENT
Start: 2020-12-22 | End: 2020-12-23 | Stop reason: HOSPADM

## 2020-12-22 RX ORDER — ACETAMINOPHEN 325 MG/1
650 TABLET ORAL EVERY 6 HOURS PRN
Status: DISCONTINUED | OUTPATIENT
Start: 2020-12-22 | End: 2020-12-23 | Stop reason: HOSPADM

## 2020-12-22 RX ORDER — IPRATROPIUM BROMIDE AND ALBUTEROL SULFATE 2.5; .5 MG/3ML; MG/3ML
3 SOLUTION RESPIRATORY (INHALATION) EVERY 6 HOURS PRN
COMMUNITY

## 2020-12-22 RX ORDER — NITROGLYCERIN 0.4 MG/1
0.4 TABLET SUBLINGUAL
Status: DISCONTINUED | OUTPATIENT
Start: 2020-12-22 | End: 2020-12-23 | Stop reason: HOSPADM

## 2020-12-22 RX ORDER — IPRATROPIUM BROMIDE AND ALBUTEROL SULFATE 2.5; .5 MG/3ML; MG/3ML
3 SOLUTION RESPIRATORY (INHALATION) EVERY 6 HOURS PRN
Status: CANCELLED | OUTPATIENT
Start: 2020-12-22

## 2020-12-22 RX ORDER — CITALOPRAM 20 MG/1
20 TABLET ORAL DAILY
Status: DISCONTINUED | OUTPATIENT
Start: 2020-12-22 | End: 2020-12-23 | Stop reason: HOSPADM

## 2020-12-22 RX ORDER — IBUPROFEN 800 MG/1
800 TABLET ORAL EVERY 8 HOURS PRN
COMMUNITY
End: 2020-12-23 | Stop reason: HOSPADM

## 2020-12-22 RX ORDER — SODIUM CHLORIDE 0.9 % (FLUSH) 0.9 %
10 SYRINGE (ML) INJECTION EVERY 12 HOURS SCHEDULED
Status: DISCONTINUED | OUTPATIENT
Start: 2020-12-22 | End: 2020-12-23 | Stop reason: HOSPADM

## 2020-12-22 RX ORDER — MORPHINE SULFATE 2 MG/ML
1 INJECTION, SOLUTION INTRAMUSCULAR; INTRAVENOUS EVERY 4 HOURS PRN
Status: DISCONTINUED | OUTPATIENT
Start: 2020-12-22 | End: 2020-12-22

## 2020-12-22 RX ORDER — CARVEDILOL 25 MG/1
25 TABLET ORAL 2 TIMES DAILY WITH MEALS
Status: CANCELLED | OUTPATIENT
Start: 2020-12-22

## 2020-12-22 RX ORDER — BUMETANIDE 0.25 MG/ML
2 INJECTION INTRAMUSCULAR; INTRAVENOUS ONCE
Status: COMPLETED | OUTPATIENT
Start: 2020-12-22 | End: 2020-12-22

## 2020-12-22 RX ORDER — DEXTROSE MONOHYDRATE 25 G/50ML
25 INJECTION, SOLUTION INTRAVENOUS
Status: DISCONTINUED | OUTPATIENT
Start: 2020-12-22 | End: 2020-12-23 | Stop reason: HOSPADM

## 2020-12-22 RX ORDER — NITROGLYCERIN 0.4 MG/1
0.4 TABLET SUBLINGUAL
COMMUNITY

## 2020-12-22 RX ORDER — SODIUM CHLORIDE 0.9 % (FLUSH) 0.9 %
10 SYRINGE (ML) INJECTION AS NEEDED
Status: DISCONTINUED | OUTPATIENT
Start: 2020-12-22 | End: 2020-12-23 | Stop reason: HOSPADM

## 2020-12-22 RX ORDER — ASPIRIN 81 MG/1
81 TABLET, CHEWABLE ORAL DAILY
Status: CANCELLED | OUTPATIENT
Start: 2020-12-22

## 2020-12-22 RX ORDER — MAGNESIUM SULFATE 1 G/100ML
1 INJECTION INTRAVENOUS ONCE
Status: COMPLETED | OUTPATIENT
Start: 2020-12-22 | End: 2020-12-22

## 2020-12-22 RX ORDER — MAGNESIUM SULFATE 1 G/100ML
1 INJECTION INTRAVENOUS AS NEEDED
Status: DISCONTINUED | OUTPATIENT
Start: 2020-12-22 | End: 2020-12-23 | Stop reason: HOSPADM

## 2020-12-22 RX ORDER — ATORVASTATIN CALCIUM 40 MG/1
80 TABLET, FILM COATED ORAL DAILY
Status: CANCELLED | OUTPATIENT
Start: 2020-12-22

## 2020-12-22 RX ORDER — AMLODIPINE BESYLATE 10 MG/1
10 TABLET ORAL DAILY
Status: DISCONTINUED | OUTPATIENT
Start: 2020-12-22 | End: 2020-12-23 | Stop reason: HOSPADM

## 2020-12-22 RX ORDER — POTASSIUM CHLORIDE 1.5 G/1.77G
40 POWDER, FOR SOLUTION ORAL AS NEEDED
Status: DISCONTINUED | OUTPATIENT
Start: 2020-12-22 | End: 2020-12-23 | Stop reason: HOSPADM

## 2020-12-22 RX ORDER — POTASSIUM CHLORIDE 20 MEQ/1
40 TABLET, EXTENDED RELEASE ORAL EVERY 4 HOURS
Status: COMPLETED | OUTPATIENT
Start: 2020-12-22 | End: 2020-12-22

## 2020-12-22 RX ORDER — CITALOPRAM 20 MG/1
20 TABLET ORAL DAILY
Status: CANCELLED | OUTPATIENT
Start: 2020-12-22

## 2020-12-22 RX ORDER — POTASSIUM CHLORIDE 750 MG/1
40 TABLET, FILM COATED, EXTENDED RELEASE ORAL AS NEEDED
Status: DISCONTINUED | OUTPATIENT
Start: 2020-12-22 | End: 2020-12-23 | Stop reason: HOSPADM

## 2020-12-22 RX ORDER — HYDROCODONE BITARTRATE AND ACETAMINOPHEN 5; 325 MG/1; MG/1
1 TABLET ORAL EVERY 6 HOURS PRN
Status: DISCONTINUED | OUTPATIENT
Start: 2020-12-22 | End: 2020-12-23 | Stop reason: HOSPADM

## 2020-12-22 RX ORDER — MAGNESIUM SULFATE HEPTAHYDRATE 40 MG/ML
2 INJECTION, SOLUTION INTRAVENOUS AS NEEDED
Status: DISCONTINUED | OUTPATIENT
Start: 2020-12-22 | End: 2020-12-23 | Stop reason: HOSPADM

## 2020-12-22 RX ORDER — PANTOPRAZOLE SODIUM 40 MG/1
40 TABLET, DELAYED RELEASE ORAL EVERY MORNING
Status: CANCELLED | OUTPATIENT
Start: 2020-12-22

## 2020-12-22 RX ORDER — PANTOPRAZOLE SODIUM 40 MG/1
40 TABLET, DELAYED RELEASE ORAL
Status: DISCONTINUED | OUTPATIENT
Start: 2020-12-22 | End: 2020-12-23 | Stop reason: HOSPADM

## 2020-12-22 RX ORDER — HYDRALAZINE HYDROCHLORIDE 20 MG/ML
10 INJECTION INTRAMUSCULAR; INTRAVENOUS EVERY 6 HOURS PRN
Status: DISCONTINUED | OUTPATIENT
Start: 2020-12-22 | End: 2020-12-23 | Stop reason: HOSPADM

## 2020-12-22 RX ADMIN — SODIUM CHLORIDE, PRESERVATIVE FREE 10 ML: 5 INJECTION INTRAVENOUS at 12:21

## 2020-12-22 RX ADMIN — IPRATROPIUM BROMIDE 0.5 MG: 0.5 SOLUTION RESPIRATORY (INHALATION) at 18:39

## 2020-12-22 RX ADMIN — IOPAMIDOL 90 ML: 755 INJECTION, SOLUTION INTRAVENOUS at 01:17

## 2020-12-22 RX ADMIN — MAGNESIUM SULFATE IN DEXTROSE 1 G: 10 INJECTION, SOLUTION INTRAVENOUS at 12:21

## 2020-12-22 RX ADMIN — POTASSIUM CHLORIDE 40 MEQ: 1500 TABLET, EXTENDED RELEASE ORAL at 12:21

## 2020-12-22 RX ADMIN — IPRATROPIUM BROMIDE 0.5 MG: 0.5 SOLUTION RESPIRATORY (INHALATION) at 09:45

## 2020-12-22 RX ADMIN — CARVEDILOL 25 MG: 25 TABLET, FILM COATED ORAL at 12:21

## 2020-12-22 RX ADMIN — POTASSIUM CHLORIDE 40 MEQ: 1500 TABLET, EXTENDED RELEASE ORAL at 11:25

## 2020-12-22 RX ADMIN — APIXABAN 10 MG: 5 TABLET, FILM COATED ORAL at 17:28

## 2020-12-22 RX ADMIN — ENOXAPARIN SODIUM 110 MG: 60 INJECTION SUBCUTANEOUS at 03:43

## 2020-12-22 RX ADMIN — AMLODIPINE BESYLATE 10 MG: 10 TABLET ORAL at 12:21

## 2020-12-22 RX ADMIN — CARVEDILOL 25 MG: 25 TABLET, FILM COATED ORAL at 17:27

## 2020-12-22 RX ADMIN — HYDROCODONE BITARTRATE AND ACETAMINOPHEN 1 TABLET: 5; 325 TABLET ORAL at 09:01

## 2020-12-22 RX ADMIN — HYDROCODONE BITARTRATE AND ACETAMINOPHEN 1 TABLET: 5; 325 TABLET ORAL at 22:49

## 2020-12-22 RX ADMIN — BUMETANIDE 2 MG: 0.25 INJECTION INTRAMUSCULAR; INTRAVENOUS at 14:35

## 2020-12-22 RX ADMIN — CITALOPRAM HYDROBROMIDE 20 MG: 20 TABLET ORAL at 12:21

## 2020-12-22 RX ADMIN — PANTOPRAZOLE SODIUM 40 MG: 40 TABLET, DELAYED RELEASE ORAL at 11:25

## 2020-12-22 RX ADMIN — ATORVASTATIN CALCIUM 80 MG: 40 TABLET, FILM COATED ORAL at 20:19

## 2020-12-22 RX ADMIN — IPRATROPIUM BROMIDE 0.5 MG: 0.5 SOLUTION RESPIRATORY (INHALATION) at 13:52

## 2020-12-22 NOTE — H&P
Hospitalist History and Physical        Patient Identification  Name: Leisa Vasquez  Age/Sex: 65 y.o. female  :  1955        MRN: [id number]  Visit Number: [id number]  Admit Date: 2020   PCP: China Cox PA          Chief complaint left sided chest, shoulder and upper back pain; shortness of breath    History of Present Illness:  Patient is a 65 y.o. female with history of COPD, non-insulin dependent type II DM, CAD s/p stent x2, and arthritis who presents with acute onset pain in her shoulder, left upper chest wall and left upper back of 5 days duration. She explains that she retired a few months ago and has been more sedentary since that time. She has also had trouble with her hips in recent months which have made her less active--first her right hip a few months prior to intermediate, then her left hip since intermediate. She has had CT and MRI imaging on her hips and lower back and even saw a neurologist within the last week who told her that her pain was related to bulging discs and osteoarthritis but there was no indication for surgical intervention. She denies lower extremity swelling during this period of time. In the last 5 days, she feels like her pain has migrated from her left hip to her upper chest, shoulder and back. She also reports worsening shortness of breath. She has been coughing a little more than usual. She denies fever or chills.     Review of Systems  Review of Systems   Constitutional: Positive for activity change. Negative for appetite change, chills, fatigue, fever and unexpected weight change.   HENT: Negative for congestion, postnasal drip, rhinorrhea, sinus pressure, sinus pain and sore throat.    Eyes: Negative for photophobia, pain, discharge, redness, itching and visual disturbance.   Respiratory: Positive for cough and shortness of breath. Negative for wheezing.    Cardiovascular: Positive for chest pain. Negative for palpitations and leg swelling.    Gastrointestinal: Positive for abdominal distention, diarrhea, nausea and vomiting. Negative for abdominal pain and constipation.   Endocrine: Negative for cold intolerance, heat intolerance, polydipsia, polyphagia and polyuria.   Genitourinary: Negative for difficulty urinating, dysuria, flank pain, frequency and hematuria.   Musculoskeletal: Positive for arthralgias and back pain. Negative for joint swelling, myalgias, neck pain and neck stiffness.   Skin: Negative for color change, pallor, rash and wound.   Neurological: Negative for dizziness, tremors, seizures, syncope, weakness, light-headedness, numbness and headaches.   Hematological: Negative for adenopathy. Does not bruise/bleed easily.   Psychiatric/Behavioral: Negative for agitation, behavioral problems and confusion.       History  Past Medical History:   Diagnosis Date   • Arthritis    • COPD (chronic obstructive pulmonary disease) (CMS/HCC)    • Diabetes (CMS/HCC)    • Diabetes mellitus (CMS/HCC)    • Heart & renal disease, hypertensive malignant with heart failure (CMS/HCC)    • Heart attack (CMS/HCC)    • History of bronchitis    • Low back pain    • Pneumonia      Past Surgical History:   Procedure Laterality Date   • CHOLECYSTECTOMY     • CORONARY STENT PLACEMENT  02/08/2013    3.0x23mm xieuce mid RCA done at ; LAD [id number] Ref #[id number] done at [facility] 12/29/12   • HYSTERECTOMY       Family History   Problem Relation Age of Onset   • Cancer Mother    • No Known Problems Father      Social History     Tobacco Use   • Smoking status: Current Every Day Smoker     Packs/day: 1.00     Years: 30.00     Pack years: 30.00     Types: Cigarettes   • Smokeless tobacco: Never Used   Substance Use Topics   • Alcohol use: No     Frequency: Never   • Drug use: No     (Not in a hospital admission)    Allergies:  Codeine    Objective     Vital Signs  Temp:  [97 °F (36.1 °C)-97.1 °F (36.2 °C)] 97.1 °F (36.2 °C)  Heart Rate:  [75-90] 83  Resp:   [16-18] 16  BP: (116-172)/() 159/71  Body mass index is 38.34 kg/m².    Physical Exam:  Physical Exam  Constitutional:       General: She is not in acute distress.     Appearance: Normal appearance. She is obese. She is not ill-appearing or diaphoretic.      Comments: Appears uncomfortable due to pain   HENT:      Head: Normocephalic and atraumatic.      Right Ear: External ear normal.      Left Ear: External ear normal.      Nose: Nose normal.      Mouth/Throat:      Mouth: Mucous membranes are moist.      Pharynx: Oropharynx is clear.   Eyes:      Extraocular Movements: Extraocular movements intact.      Conjunctiva/sclera: Conjunctivae normal.      Pupils: Pupils are equal, round, and reactive to light.   Neck:      Musculoskeletal: Normal range of motion and neck supple. No muscular tenderness.   Cardiovascular:      Rate and Rhythm: Normal rate and regular rhythm.      Pulses: Normal pulses.      Heart sounds: Normal heart sounds. No murmur.   Pulmonary:      Effort: Pulmonary effort is normal.      Breath sounds: Wheezing present.   Abdominal:      General: Abdomen is flat. Bowel sounds are normal. There is no distension.      Palpations: Abdomen is soft.      Tenderness: There is no abdominal tenderness.   Musculoskeletal: Normal range of motion.         General: Tenderness (to palpation of left shoulder, as well as left upper chest wall. Tender with movement of left shoulder. Not exacerbated by deep inspiration) present. No swelling.   Lymphadenopathy:      Cervical: No cervical adenopathy.   Skin:     General: Skin is warm and dry.      Capillary Refill: Capillary refill takes less than 2 seconds.      Coloration: Skin is not jaundiced or pale.      Findings: No bruising, erythema, lesion or rash.   Neurological:      General: No focal deficit present.      Mental Status: She is alert and oriented to person, place, and time.   Psychiatric:         Mood and Affect: Mood normal.         Behavior:  Behavior normal.         Thought Content: Thought content normal.         Judgment: Judgment normal.           Results Review:       Lab Results:  Results from last 7 days   Lab Units 12/21/20 2138   WBC 10*3/mm3 16.03*   HEMOGLOBIN g/dL 14.0   PLATELETS 10*3/mm3 184     Results from last 7 days   Lab Units 12/21/20  2138   CRP mg/dL 8.83*     Results from last 7 days   Lab Units 12/21/20 2236   SODIUM mmol/L 133*   POTASSIUM mmol/L 3.8   CHLORIDE mmol/L 101   CO2 mmol/L 22.3   BUN mg/dL 22   CREATININE mg/dL 0.71   CALCIUM mg/dL 9.6   GLUCOSE mg/dL 94     Results from last 7 days   Lab Units 12/21/20  2138   MAGNESIUM mg/dL 1.6     Hemoglobin A1C   Date Value Ref Range Status   12/21/2020 6.60 (H) 4.80 - 5.60 % Final     Results from last 7 days   Lab Units 12/21/20 2236   BILIRUBIN mg/dL 1.3*   ALK PHOS U/L 92   AST (SGOT) U/L 15   ALT (SGPT) U/L 17     Results from last 7 days   Lab Units 12/22/20  0026 12/21/20 2236 12/21/20 2138   CK TOTAL U/L  --   --  63   TROPONIN T ng/mL <0.010 <0.010  --          Results from last 7 days   Lab Units 12/21/20 2138   INR  1.01     Results from last 7 days   Lab Units 12/21/20  2159   PH, ARTERIAL pH units 7.406   PO2 ART mm Hg 55.2*   PCO2, ARTERIAL mm Hg 42.8   HCO3 ART mmol/L 26.9*       I have reviewed the patient's laboratory results.    Imaging:  Imaging Results (Last 72 Hours)     Procedure Component Value Units Date/Time    CT Chest Pulmonary Embolism [[id number]] Collected: 12/22/20 0136     Updated: 12/22/20 0138    Narrative:      CT CHEST PULMONARY EMBOLISM W CONTRAST    INDICATION:   Chest pain and shortness of breath this morning      TECHNIQUE:   CT angiogram of the chest with IV contrast. 3-D reconstructions were obtained and reviewed.   Radiation dose reduction techniques included automated exposure control or exposure modulation based on body size. Count of known CT and cardiac nuc med studies  performed in previous 12 months: 0.     COMPARISON:   None  available.    FINDINGS:   The lungs are clear. The thyroid gland is normal. The aorta is normal in size. There is no dissection. The pulmonary arteries are not optimally opacified. The pulmonary veins are slightly denser than the pulmonary arteries.. There does appear to be some  filling defects in the right lower lobe pulmonary arteries best seen on images 200 through to 224 of the coronal series and there appears be a small left lower lobe pulmonary embolus on image 200. The visualized portions upper abdomen are unremarkable.  Bones are unremarkable.      Impression:      small bilateral nonocclusive lower lobe pulmonary emboli.    The results been discussed with Dr. Wade in the ER    Otherwise normal    Signer Name: Papito Gordon MD   Signed: 12/22/2020 1:36 AM   Workstation Name: [email or web address]    Radiology [facility]    CT Upper Extremity Left Without Contrast [[id number]] Collected: 12/22/20 0127     Updated: 12/22/20 0129    Narrative:      CT UE LT WO    INDICATION:    Left shoulder pain this morning with no known injury.    TECHNIQUE:   CT of the shoulder without contrast. Coronal and sagittal reconstructions were obtained.  Radiation dose reduction techniques included automated exposure control or exposure modulation based on body size. Radiation audit for number of CT and nuclear  cardiology exams performed in the last year: 0.      COMPARISON:    None available.    FINDINGS:  There is no fracture or dislocation visible. There is degenerative change at the acromioclavicular joint. Soft tissues are normal.      Impression:      There is some AC joint degenerative change otherwise the left shoulder appears normal.    Signer Name: Papito Gordon MD   Signed: 12/22/2020 1:27 AM   Workstation Name: [employer]    [facility]    CT Cervical Spine Without Contrast [[id number]] Collected: 12/22/20 0125     Updated: 12/22/20 0128    Narrative:      CT Spine Cervical WO    INDICATION:    Pain in neck and left shoulder this morning without trauma.    TECHNIQUE:   CT of the cervical spine without IV contrast. Coronal and sagittal reconstructions were obtained.  Radiation dose reduction techniques included automated exposure control or exposure modulation based on body size. Count of known CT and cardiac nuc med  studies performed in previous 12 months: 0.     COMPARISON:  None available.    FINDINGS:  The alignment is normal. There is mild disc space narrowing at C4-5, C5-6 and C6-7. There is no fracture. There is mild spinal stenosis is 5 6 and C6-7.      Impression:      Degenerative changes mid cervical spine with mild spinal stenosis C5-6 and C6-7. No acute findings    Signer Name: Papito Gordon MD   Signed: 12/22/2020 1:25 AM   Workstation Name: [facility]    Radiology [facility]    CT Head Without Contrast [[id number]] Collected: 12/22/20 0119     Updated: 12/22/20 0122    Narrative:      CT Head WO    HISTORY:   Pain today    TECHNIQUE:   Axial unenhanced head CT. Radiation dose reduction techniques included automated exposure control or exposure modulation based on body size. Count of known CT and cardiac nuc med studies performed in previous 12 months: 0.     Time of scan: A 40 9:00 AM    COMPARISON:   None.    FINDINGS:   No intracranial hemorrhage, mass, or infarct. No hydrocephalus or extra-axial fluid collection. Brain parenchymal density is normal. The skull base, calvarium, and extracranial soft tissues are normal.      Impression:      Normal, negative unenhanced head CT.          Signer Name: Papito Gordon MD   Signed: 12/22/2020 1:19 AM   Workstation Name: [facility]    Radiology [facility]    XR Chest 1 View [[id number]] Collected: 12/21/20 2159     Updated: 12/21/20 2201    Narrative:      PROCEDURE: CR Chest 1 Vw    COMPARISON:  No relevant comparison or correlation studies available at time of dictation.     INDICATIONS: chest pain  Relevant clinical  info: PT IS C/O PAIN TO LEFT SHOULDER, BEGAN ON WEDNESDAY, , NOW IT HURTS ACROSS HER BACK INTO HER RIGHT SHOULDER, OCCASIONALLY IN CHEST, ALSO FEELING NAUSEA, DIARRHEA, VOMITING, HEADACHE, COUGH     TECHNIQUE: Single AP  view of the chest    FINDINGS:  Cardiomediastinal silhouette is within normal limits given projection.  Lungs are relatively well inflated and clear. Osseous structures are intact.      Impression:      No acute disease.         Signer Name: Re Tenorio MD   Signed: 12/21/2020 9:59 PM   Workstation Name: [facility]    [facility]          I have personally reviewed the patient's radiologic imaging.        EKG: Borderline sinus tachycardia, HR 94, QTc 422. Low voltage QRS.     I have personally reviewed the patient's EKG.        Assessment/Plan     - Pulmonary emboli (CMS/HCC), bilateral lower lobes, in setting of recent senior living resulting in sedentary lifestyle. Her decreased activity has been exacerbated by right hip pain followed by left hip pain in recent months. Also a lifelong smoker. No history of cancer. No reported personal or family history of blood clots. Initiating full anticoagulation with SQ lovenox, with plans to convert to oral anticoagulation soon. Low dose norco ordered for severe left upper chest wall, back and shoulder pain which could possibly be referred pain from lower lobe PE's causing diaphragmatic irritation.   - Acute hypoxic respiratory failure secondary to above: treat as above. Supplemental oxygen 2L also ordered. Underlying COPD could also be contributing, though does not appear to be in acute exacerbation at this time. Repeat ABG later this morning to ensure not developing hypercarbia on supplemental oxygen.  - Leukocytosis, CRP elevation: no source of infection identified at this time. Could be reactive from PE's above. Continue to trend off antibiotics for now.  - Osteoarthritis: CT imaging results noted above, showing only chronic  degenerative changes in cervical spine and left shoulder.    - History CAD s/p stents: troponin negative x2. No obvious ST changes to suggest acute ischemia on EKG. Chest pain atypical for angina. Review home meds once reconciled by pharmacy.  - Type II DM, non insulin dependent: check A1c. Monitor accuchecks, cover with SSI.   - Nausea, vomiting, diarrhea: reports this started about 3 days ago, but she cured herself by not eating. She is continuing to drink fluids. Diet ordered, monitor how she does with solids.     DVT Prophylaxis: SQ lovenox therapeutic dose as noted above    Estimated Length of Stay >2 midnights    I discussed the patient's findings, assessment and plan with the patient and her RN Francis who was present during my interview and exam on [facility].     * patient is high risk due to bilateral pulmonary emboli, acute hypoxic respiratory failure    Chris Mcdermott MD  12/22/20  03:20 EST

## 2020-12-22 NOTE — PROGRESS NOTES
[facility] Medicine Services  BRIEF PROGRESS NOTE    Patient admitted after midnight per night shift. Patient admitted with small bilateral nonocclusive lower lobe pulmonary emboli with acute hypoxic respiratory failure. Currently felt to be provoked with increase in sedentary lifestyle and ongoing tobacco abuse.    Overall, vitals and labs stable.  Patient did have some mild leukocytosis on admission that has improved.  Blood cultures pending.  No radiological evidence of acute infection, no consolidations or infiltrates noted on imaging of the chest.  Urinalysis unremarkable.  Patient afebrile.  Follow cultures for final results.  Repeat CBC in a.m.  Blood glucose levels controlled, hemoglobin A1c noted to be 6.6 with adequate control.  Continue with sliding scale insulin for now.  Hold home Metformin for now due to contrasted study.  Systolic blood pressure is slightly above goal, resume home antihypertensive regimen once reconciled per pharmacy.  Will make as needed IV hydralazine available.  Patient with history of CAD post stenting, continue aspirin and statin with close telemetry monitoring.  Troponin T negative in ED.  EKG with no evidence of acute ischemia, no ST elevation.    On exam, patient sitting up in bed, no acute distress noted. Appears to be uncomfortable. She is on 2 LPM NC, no respiratory distress. Speaks in full sentences without dyspnea. She complains of upper back pain, worse with inspiration. Pain is reproducible on palpation over the entire thoracic cage, no spinous process point tenderness. No paraesthesias, numbness, tingling. No sensory deficits or motor deficits. Lungs are CTAB. Heart RRR no MRG. No lower extremity edema appreciated. No neurological deficits. Alert and oriented.     Assessment and plan  -Bilateral pulmonary emboli with acute hypoxic respiratory failure  · Noted on CT imaging  · [place] to be provoked in setting of recently increasing sedentary  lifestyle, ongoing tobacco abuse, and obesity.  · Patient started on full dose Lovenox.  Pharmacy has been consulted for pricing of NOACs.  · PRN pain medication available   · Encourage incentive spirometry   · Transthoracic echocardiogram pending to evaluate for right heart strain  · Bilateral lower extremity venous Doppler ultrasound pending to rule out DVT  · Anticoagulation discussed with patient including risk/benefits of anticoagulation.  Patient understands the risk of bleeding while on anticoagulation and should spontaneous/abnormal bleeding occur patient understands to seek immediate medical attention.  Patient educated on being at increased risk of bleeding with anticoagulation.  Patient will likely be on anticoagulation for at least 6 months.  · Hypercoagulable work-up on hold due to active thrombus as it can skew results.  We will go ahead and send for factor V Leiden and prothrombin gene mutation today.  · Patient will need to follow-up with hematology in the outpatient setting within 2 months post discharge for further hypercoagulable work-up and discussing length of anticoagulation.  · Continue close telemetry monitoring.  · Supplemental oxygen as necessary to titrate SPO2 greater than 90%.  Wean as tolerated.  Patient now noted to have mild hypercarbia.  PO2 improved.  She does have history of COPD.  I have added nebulizer treatments.  Repeat ABG later this morning.    Further care pending clinical course. See chart for orders. Repeat labs in AM.  Place electrolytes per protocol as necessary.  Monitor closely. Call/page with any questions or concerns      Linda Landaverde PA-C  Hospitalist Service -- [facility]   Pager: [phone]    12/22/20  07:27 EST    Attending Physician: Tania Epperson MD       Addendum 1021: Contacted per pharmacy, insurance with $0 copay for NOACs. Discussed with attending physician Dr. Epperson. Will proceed with Eliquis 10 mg BID x 7 days then 5 mg BID  thereafter. Orders placed in Epic.

## 2020-12-22 NOTE — PLAN OF CARE
Goal Outcome Evaluation:         Patient reports pain in her left arm. Pain medicine ordered and cardiac markers negative. Will continue to monitor.

## 2020-12-22 NOTE — PLAN OF CARE
Goal Outcome Evaluation:  Plan of Care Reviewed With: patient  Progress: no change  Patient has rested in bed throughout shift, ambulating to restroom frequently. 2LNC in place. Patient has complained of pain once this shift, PRN pain medication administered. No distress noted. Will continue to monitor.

## 2020-12-22 NOTE — PHARMACY PATIENT ASSISTANCE
Pharmacy was asked to check on the cost of NOACs. Per patient's insurance, Eliquis, Pradaxa, and Xarelto will have $0 copays.    Thank you,  Samantha Donaldson, PharmD

## 2020-12-22 NOTE — PROGRESS NOTES
Discharge Planning Assessment  DEONTE Chavira     Patient Name: Leisa Vasquez  MRN: [id number]  Today's Date: 12/22/2020    Admit Date: 12/21/2020      Discharge Plan     Row Name 12/22/20 1028       Plan    Plan SS received consult diagnosis that may require a lot of new medications. Case management RN reviewed pt and Pharmacy checked cost of Eliquis, Pradaxa, and Xarelto. Pt has $0 copay for this medications per Pharmacy. SS to be re-consulted if needed. No other needs identified.        DANITA Munson

## 2020-12-22 NOTE — ED PROVIDER NOTES
Subjective   History of Present Illness  Patient presents the emergency department for evaluation of chest pain. Her pain is in her left upper chest it has been worsening over the past week or so. She is also complaining of worsening shortness of breath over the past week or ana. She denies history of leg swelling, recent surgery. She has been more sendieary sincer retirment oabout 5 months or so ago. She is no on anticoagulation. No history of blood clots.   Review of Systems   Constitutional: Negative.    HENT: Negative.    Eyes: Negative.    Respiratory: Positive for shortness of breath.    Cardiovascular: Positive for chest pain.   Gastrointestinal: Negative.    Endocrine: Negative.    Genitourinary: Negative.    Musculoskeletal: Negative.    Skin: Negative.    Allergic/Immunologic: Negative.    Neurological: Negative.    Hematological: Negative.    Psychiatric/Behavioral: Negative.        Past Medical History:   Diagnosis Date   • Arthritis    • COPD (chronic obstructive pulmonary disease) (CMS/[facility])    • Coronary artery disease s/p statnting int he past 12/22/2020   • Diabetes (CMS/[facility])    • Diabetes mellitus (CMS/[facility])    • Essential hypertension 12/22/2020   • Heart & renal disease, hypertensive malignant with heart failure (CMS/[facility])    • Heart attack (CMS/HCC)    • History of bronchitis    • Hyperlipidemia 12/22/2020   • Low back pain    • Obesity (BMI 30-39.9) 12/22/2020   • Pneumonia    • Pulmonary embolism (CMS/[facility])        Allergies   Allergen Reactions   • Codeine Other (See Comments)     Lethargic       Past Surgical History:   Procedure Laterality Date   • CHOLECYSTECTOMY     • CORONARY STENT PLACEMENT  02/08/2013    3.0x23mm xieuce mid RCA done at ; LAD 2.93fla10iy Ref #[id number] done at [facility] 12/29/12   • HYSTERECTOMY         Family History   Problem Relation Age of Onset   • Cancer Mother    • No Known Problems Father        Social History     Socioeconomic History   • Marital status:       Spouse name: Not on file   • Number of children: Not on file   • Years of education: Not on file   • Highest education level: Not on file   Occupational History   • Occupation: Radiology Tech   Tobacco Use   • Smoking status: Current Every Day Smoker     Packs/day: 0.50     Years: 30.00     Pack years: 15.00     Types: Cigarettes   • Smokeless tobacco: Never Used   Substance and Sexual Activity   • Alcohol use: No     Frequency: Never   • Drug use: No   • Sexual activity: Defer           Objective   Physical Exam  Vitals signs and nursing note reviewed. Exam conducted with a chaperone present.   Constitutional:       Appearance: Normal appearance.   HENT:      Head: Normocephalic and atraumatic.      Right Ear: External ear normal.      Left Ear: External ear normal.      Nose: Nose normal.      Mouth/Throat:      Mouth: Mucous membranes are moist.      Pharynx: Oropharynx is clear.   Eyes:      Extraocular Movements: Extraocular movements intact.      Pupils: Pupils are equal, round, and reactive to light.   Neck:      Musculoskeletal: Normal range of motion and neck supple.   Cardiovascular:      Rate and Rhythm: Normal rate and regular rhythm.      Pulses: Normal pulses.      Heart sounds: Normal heart sounds.   Pulmonary:      Effort: Pulmonary effort is normal.      Breath sounds: Normal breath sounds.   Abdominal:      General: Abdomen is flat. Bowel sounds are normal.      Palpations: Abdomen is soft.   Musculoskeletal: Normal range of motion.   Skin:     General: Skin is warm and dry.      Capillary Refill: Capillary refill takes less than 2 seconds.   Neurological:      General: No focal deficit present.      Mental Status: She is alert and oriented to person, place, and time.   Psychiatric:         Mood and Affect: Mood normal.         Behavior: Behavior normal.         Thought Content: Thought content normal.         Judgment: Judgment normal.         Procedures           ED Course  ED Course  as of Dec 29 0059   Mon Dec 21, 2020   2238 EKG interpretation, normal sinus rhythm, no ST or T wave changes.  Ventricular rate 82, , QRS 72, QTc 425.  Normal EKG    []   e Dec 22, 2020   0136 Dr. Gordon called from radiology to discuss the pulmonary embolism findings.  He says there is small PEs.  We are waiting the official read out.    [JM]      ED Course User Index  [] Davion Wade,                                            MDM  Number of Diagnoses or Management Options  Pulmonary embolism, unspecified chronicity, unspecified pulmonary embolism type, unspecified whether acute cor pulmonale present (CMS/HCC): new and requires workup  Diagnosis management comments: Patient william be admiteted for evaluaton of first time blood clot, new onset bilater PE       Amount and/or Complexity of Data Reviewed  Clinical lab tests: reviewed and ordered  Tests in the radiology section of CPT®: reviewed and ordered  Tests in the medicine section of CPT®: reviewed and ordered  Discussion of test results with the performing providers: yes  Decide to obtain previous medical records or to obtain history from someone other than the patient: yes  Obtain history from someone other than the patient: yes  Review and summarize past medical records: yes  Independent visualization of images, tracings, or specimens: yes    Risk of Complications, Morbidity, and/or Mortality  Presenting problems: moderate  Diagnostic procedures: moderate  Management options: moderate    Patient Progress  Patient progress: stable      Final diagnoses:   Pulmonary embolism, unspecified chronicity, unspecified pulmonary embolism type, unspecified whether acute cor pulmonale present (CMS/HCC)            Davion Wade DO  12/29/20 0059

## 2020-12-22 NOTE — ED NOTES
Pavithra rn with patient satisfaction present and speaking to pt while pt is awaiting in er lobby due to high pt volume, instr. Pt we would room her as soon as possible, to notify staff of any changes while waiting, pt verb understanding     Whitney Garcia RN  12/21/20 1942

## 2020-12-22 NOTE — ED NOTES
EKG completed by [facility] @ 2231 and given to Dr. Wade @ 2232.     Azucena Gonzalez  12/21/20 2235

## 2020-12-22 NOTE — NURSING NOTE
"Report called to [facility] and patient transferred to room 343b. Patient refused us to contact family and inform them of room change stating they \"have been up all night, and she would call them in the morning\". Will continue to monitor.   "

## 2020-12-23 VITALS
OXYGEN SATURATION: 93 % | HEART RATE: 73 BPM | SYSTOLIC BLOOD PRESSURE: 129 MMHG | TEMPERATURE: 98.4 F | DIASTOLIC BLOOD PRESSURE: 63 MMHG | WEIGHT: 242 LBS | HEIGHT: 67 IN | RESPIRATION RATE: 18 BRPM | BODY MASS INDEX: 37.98 KG/M2

## 2020-12-23 LAB
ANION GAP SERPL CALCULATED.3IONS-SCNC: 10.1 MMOL/L (ref 5–15)
BUN SERPL-MCNC: 23 MG/DL (ref 8–23)
BUN/CREAT SERPL: 29.9 (ref 7–25)
CALCIUM SPEC-SCNC: 9.5 MG/DL (ref 8.6–10.5)
CHLORIDE SERPL-SCNC: 101 MMOL/L (ref 98–107)
CHOLEST SERPL-MCNC: 178 MG/DL (ref 0–200)
CO2 SERPL-SCNC: 25.9 MMOL/L (ref 22–29)
CREAT SERPL-MCNC: 0.77 MG/DL (ref 0.57–1)
DEPRECATED RDW RBC AUTO: 43.5 FL (ref 37–54)
ERYTHROCYTE [DISTWIDTH] IN BLOOD BY AUTOMATED COUNT: 11.9 % (ref 12.3–15.4)
F5 GENE MUT ANL BLD/T: NORMAL
FACTOR II, DNA ANALYSIS: NORMAL
GFR SERPL CREATININE-BSD FRML MDRD: 76 ML/MIN/1.73
GLUCOSE BLDC GLUCOMTR-MCNC: 114 MG/DL (ref 70–130)
GLUCOSE BLDC GLUCOMTR-MCNC: 147 MG/DL (ref 70–130)
GLUCOSE SERPL-MCNC: 122 MG/DL (ref 65–99)
HCT VFR BLD AUTO: 40.1 % (ref 34–46.6)
HDLC SERPL-MCNC: 40 MG/DL (ref 40–60)
HGB BLD-MCNC: 12.6 G/DL (ref 12–15.9)
LDLC SERPL CALC-MCNC: 122 MG/DL (ref 0–100)
LDLC/HDLC SERPL: 3.03 {RATIO}
MAGNESIUM SERPL-MCNC: 1.6 MG/DL (ref 1.6–2.4)
MCH RBC QN AUTO: 31 PG (ref 26.6–33)
MCHC RBC AUTO-ENTMCNC: 31.4 G/DL (ref 31.5–35.7)
MCV RBC AUTO: 98.5 FL (ref 79–97)
PHOSPHATE SERPL-MCNC: 3.4 MG/DL (ref 2.5–4.5)
PLATELET # BLD AUTO: 180 10*3/MM3 (ref 140–450)
PMV BLD AUTO: 10.5 FL (ref 6–12)
POTASSIUM SERPL-SCNC: 4 MMOL/L (ref 3.5–5.2)
RBC # BLD AUTO: 4.07 10*6/MM3 (ref 3.77–5.28)
SODIUM SERPL-SCNC: 137 MMOL/L (ref 136–145)
TRIGL SERPL-MCNC: 85 MG/DL (ref 0–150)
VLDLC SERPL-MCNC: 16 MG/DL (ref 5–40)
WBC # BLD AUTO: 10.98 10*3/MM3 (ref 3.4–10.8)

## 2020-12-23 PROCEDURE — 96376 TX/PRO/DX INJ SAME DRUG ADON: CPT

## 2020-12-23 PROCEDURE — 99217 PR OBSERVATION CARE DISCHARGE MANAGEMENT: CPT | Performed by: INTERNAL MEDICINE

## 2020-12-23 PROCEDURE — 85027 COMPLETE CBC AUTOMATED: CPT | Performed by: INTERNAL MEDICINE

## 2020-12-23 PROCEDURE — 80061 LIPID PANEL: CPT | Performed by: PHYSICIAN ASSISTANT

## 2020-12-23 PROCEDURE — 80048 BASIC METABOLIC PNL TOTAL CA: CPT | Performed by: PHYSICIAN ASSISTANT

## 2020-12-23 PROCEDURE — 96366 THER/PROPH/DIAG IV INF ADDON: CPT

## 2020-12-23 PROCEDURE — 83735 ASSAY OF MAGNESIUM: CPT | Performed by: PHYSICIAN ASSISTANT

## 2020-12-23 PROCEDURE — 25010000002 MAGNESIUM SULFATE IN D5W 1G/100ML (PREMIX) 1-5 GM/100ML-% SOLUTION: Performed by: INTERNAL MEDICINE

## 2020-12-23 PROCEDURE — 82962 GLUCOSE BLOOD TEST: CPT

## 2020-12-23 PROCEDURE — G0378 HOSPITAL OBSERVATION PER HR: HCPCS

## 2020-12-23 PROCEDURE — 84100 ASSAY OF PHOSPHORUS: CPT | Performed by: PHYSICIAN ASSISTANT

## 2020-12-23 PROCEDURE — 94799 UNLISTED PULMONARY SVC/PX: CPT

## 2020-12-23 RX ORDER — BUMETANIDE 0.25 MG/ML
2 INJECTION INTRAMUSCULAR; INTRAVENOUS ONCE
Status: COMPLETED | OUTPATIENT
Start: 2020-12-23 | End: 2020-12-23

## 2020-12-23 RX ORDER — BUMETANIDE 2 MG/1
2 TABLET ORAL DAILY
Qty: 30 TABLET | Refills: 0 | Status: SHIPPED | OUTPATIENT
Start: 2020-12-24 | End: 2021-01-23

## 2020-12-23 RX ORDER — BUMETANIDE 1 MG/1
2 TABLET ORAL DAILY
Status: DISCONTINUED | OUTPATIENT
Start: 2020-12-24 | End: 2020-12-23 | Stop reason: HOSPADM

## 2020-12-23 RX ORDER — BUMETANIDE 2 MG/1
2 TABLET ORAL DAILY
Qty: 30 TABLET | Refills: 0 | Status: SHIPPED | OUTPATIENT
Start: 2020-12-24 | End: 2020-12-23

## 2020-12-23 RX ORDER — MAGNESIUM SULFATE 1 G/100ML
1 INJECTION INTRAVENOUS ONCE
Status: COMPLETED | OUTPATIENT
Start: 2020-12-23 | End: 2020-12-23

## 2020-12-23 RX ADMIN — CARVEDILOL 25 MG: 25 TABLET, FILM COATED ORAL at 08:23

## 2020-12-23 RX ADMIN — AMLODIPINE BESYLATE 10 MG: 10 TABLET ORAL at 08:24

## 2020-12-23 RX ADMIN — BUMETANIDE 2 MG: 0.25 INJECTION, SOLUTION INTRAMUSCULAR; INTRAVENOUS at 10:12

## 2020-12-23 RX ADMIN — IPRATROPIUM BROMIDE 0.5 MG: 0.5 SOLUTION RESPIRATORY (INHALATION) at 07:44

## 2020-12-23 RX ADMIN — SODIUM CHLORIDE, PRESERVATIVE FREE 10 ML: 5 INJECTION INTRAVENOUS at 08:24

## 2020-12-23 RX ADMIN — MAGNESIUM SULFATE 1 G: 1 INJECTION INTRAVENOUS at 06:42

## 2020-12-23 RX ADMIN — PANTOPRAZOLE SODIUM 40 MG: 40 TABLET, DELAYED RELEASE ORAL at 06:27

## 2020-12-23 RX ADMIN — CITALOPRAM HYDROBROMIDE 20 MG: 20 TABLET ORAL at 08:24

## 2020-12-23 RX ADMIN — APIXABAN 10 MG: 5 TABLET, FILM COATED ORAL at 08:23

## 2020-12-23 RX ADMIN — ASPIRIN 81 MG: 81 TABLET, CHEWABLE ORAL at 08:24

## 2020-12-23 NOTE — PROGRESS NOTES
Discharge Planning Assessment   Fan     Patient Name: Leisa Vasquez  MRN: [id number]  Today's Date: 12/23/2020    Admit Date: 12/21/2020      Discharge Plan     Row Name 12/23/20 1407       Plan    Final Discharge Disposition Code  01 - home or self-care    Final Note Pt is being discharged home today. No needs identified.      DANITA Munson

## 2020-12-23 NOTE — DISCHARGE SUMMARY
[facility] HOSPITALISTS DISCHARGE SUMMARY    Patient Identification:  Name:  Leisa Vasquez  Age:  65 y.o.  Sex:  female  :  1955  MRN:  [id number]  Visit Number:  [id number]    Date of Admission: 2020  Date of Discharge:  2020     PCP: China Cox PA    DISCHARGE DIAGNOSIS  -Bilateral pulmonary emboli, pending hypercoagulable work-up  -acute hypoxic respiratory failure, due to subacute cardiogenic pulmonary edema acute on chronic decompensated diastolic heart failure  -CAD  -Essential hypertension  -Hyperlipidemia COPD         HOSPITAL COURSE  Patient is a 65 y.o. female presented to [facility] complaining of SOB.  Please see the admitting history and physical for further details.  Lovenox and later on switched to apixaban, monitor during her stay, her oxygenation requirement has improved as she received IV Bumex during her stay.  The patient is going to be placed on p.o. Bumex at discharge time.  To be restarted on Metformin from tomorrow at 250 mg twice a day as her A1c is 6.6 and her sugars during her stay in the hospital were very mildly elevated.  Hypercoagulable work-up has been ordered yesterday and need to follow-up in the outpatient setting with her PCP.        VITAL SIGNS:  Temp:  [97.5 °F (36.4 °C)-98.4 °F (36.9 °C)] 98.4 °F (36.9 °C)  Heart Rate:  [70-93] 73  Resp:  [18-19] 18  BP: (109-146)/(53-75) 129/63  SpO2:  [91 %-97 %] 91 %  on  Flow (L/min):  [2] 2;   Device (Oxygen Therapy): room air    Body mass index is 37.9 kg/m².  Wt Readings from Last 3 Encounters:   20 110 kg (242 lb)   12/15/20 113 kg (250 lb 3.2 oz)   19 112 kg (246 lb 6.4 oz)       PHYSICAL EXAM:  Constitutional:  Well-developed and well-nourished.  No respiratory distress.      HENT:  Head: Normocephalic and atraumatic.  Mouth:  Moist mucous membranes.    Eyes:  Conjunctivae and EOM are normal.  Pupils are equal, round, and reactive to light.  No scleral icterus.  Neck:   Neck supple.  No JVD present.    Cardiovascular:  Normal rate, regular rhythm and normal heart sounds with no murmur.  Pulmonary/Chest:  No respiratory distress, no wheezes, no crackles, with normal breath sounds and good air movement.  Abdominal:  Soft.  Bowel sounds are normal.  No distension and no tenderness.   Musculoskeletal:  No edema, no tenderness, and no deformity.  No red or swollen joints anywhere.    Neurological:  Alert and oriented to person, place, and time.  No cranial nerve deficit.  No tongue deviation.  No facial droop.  No slurred speech.   Skin:  Skin is warm and dry.  No rash noted.  No pallor.   Peripheral vascular:  No edema and strong pulses on all 4 extremities.      DISCHARGE DISPOSITION   Stable    DISCHARGE MEDICATIONS:     Discharge Medications      New Medications      Instructions Start Date   apixaban 5 MG tablet tablet  Commonly known as: ELIQUIS   10 mg, Oral, Every 12 Hours Scheduled      apixaban 5 MG tablet tablet  Commonly known as: ELIQUIS   5 mg, Oral, Every 12 Hours Scheduled   Start Date: December 29, 2020     bumetanide 2 MG tablet  Commonly known as: BUMEX   2 mg, Oral, Daily   Start Date: December 24, 2020        Changes to Medications      Instructions Start Date   metFORMIN 500 MG tablet  Commonly known as: GLUCOPHAGE  What changed:   · medication strength  · how much to take   250 mg, Oral, 2 Times Daily With Meals   Start Date: December 24, 2020        Continue These Medications      Instructions Start Date   amLODIPine 10 MG tablet  Commonly known as: NORVASC   10 mg, Oral, Daily      aspirin 81 MG chewable tablet   81 mg, Oral, Daily      atorvastatin 80 MG tablet  Commonly known as: LIPITOR   80 mg, Oral, Daily      carvedilol 25 MG tablet  Commonly known as: COREG   25 mg, Oral, 2 Times Daily With Meals      citalopram 20 MG tablet  Commonly known as: CeleXA   20 mg, Oral, Daily      ipratropium-albuterol 0.5-2.5 mg/3 ml nebulizer  Commonly known as: DUO-NEB   3  mL, Nebulization, Every 6 Hours PRN      nitroglycerin 0.4 MG SL tablet  Commonly known as: NITROSTAT   0.4 mg, Sublingual, Every 5 Minutes PRN, Take no more than 3 doses in 15 minutes.       omeprazole 20 MG capsule  Commonly known as: priLOSEC   20 mg, Oral, Daily         Stop These Medications    ibuprofen 800 MG tablet  Commonly known as: ADVIL,MOTRIN            Diet Instructions     Diet: Cardiac      Discharge Diet: Cardiac        Activity Instructions     Activity as Tolerated          No future appointments.    Additional Instructions for the Follow-ups that You Need to Schedule     Discharge Follow-up with PCP   As directed       Currently Documented PCP:    China Cox PA    PCP Phone Number:    [phone]     Follow Up Details: PCP in a week           Follow-up Information     China Cox PA .    Specialty: Physician Assistant  Why: PCP in a week  Contact information:  [address] [place] [address]  [phone]                    TEST  RESULTS PENDING AT DISCHARGE       CODE STATUS  Code Status and Medical Interventions:   Ordered at: 12/22/20 0208     Level Of Support Discussed With:    Patient     Code Status:    CPR     Medical Interventions (Level of Support Prior to Arrest):    Full       Mhd Nnamdi Epperson MD  12/23/20  12:03 EST    Please note that this discharge summary required more than 30 minutes to complete.    Please send a copy of this dictation to the following providers:  China Cox PA

## 2020-12-23 NOTE — PHARMACY PATIENT ASSISTANCE
Pharmacy evaluated co-pay for a new medication, Eliquis. According to patient's insurance, patient has a $0 co-pay.    Uche Colbert, Pharmacy Intern

## 2020-12-23 NOTE — PLAN OF CARE
Problem: Fall Injury Risk  Goal: Absence of Fall and Fall-Related Injury  Outcome: Ongoing, Progressing  Intervention: Promote Injury-Free Environment  Recent Flowsheet Documentation  Taken 12/23/2020 0100 by Khadijah Ramos RN  Safety Promotion/Fall Prevention: safety round/check completed  Taken 12/22/2020 2300 by Khadijah Ramos RN  Safety Promotion/Fall Prevention: safety round/check completed  Taken 12/22/2020 2100 by Khadijah Ramos RN  Safety Promotion/Fall Prevention: safety round/check completed  Taken 12/22/2020 1900 by Khadijah Ramos RN  Safety Promotion/Fall Prevention: safety round/check completed     Problem: Adjustment to Illness COPD (Chronic Obstructive Pulmonary Disease)  Goal: Optimal Chronic Illness Coping  Outcome: Ongoing, Progressing     Problem: Functional Ability Impaired COPD (Chronic Obstructive Pulmonary Disease)  Goal: Optimal Level of Functional [place]  Outcome: Ongoing, Progressing     Problem: Infection COPD (Chronic Obstructive Pulmonary Disease)  Goal: Absence of Infection Signs and Symptoms  Outcome: Ongoing, Progressing     Problem: Adult Inpatient Plan of Care  Goal: Plan of Care Review  Outcome: Ongoing, Progressing  Flowsheets  Taken 12/23/2020 0251 by Khadijah Ramos RN  Progress: no change  Taken 12/22/2020 1757 by Hawa Ho RN  Plan of Care Reviewed With: patient  Goal: Patient-Specific Goal (Individualized)  Outcome: Ongoing, Progressing  Goal: Absence of Hospital-Acquired Illness or Injury  Outcome: Ongoing, Progressing  Intervention: Identify and Manage Fall Risk  Recent Flowsheet Documentation  Taken 12/23/2020 0100 by Khadijah Ramos RN  Safety Promotion/Fall Prevention: safety round/check completed  Taken 12/22/2020 2300 by Khadijah Ramos RN  Safety Promotion/Fall Prevention: safety round/check completed  Taken 12/22/2020 2100 by Khadijah Ramos RN  Safety Promotion/Fall Prevention: safety  round/check completed  Taken 12/22/2020 1900 by Khadijah Ramos RN  Safety Promotion/Fall Prevention: safety round/check completed  Intervention: Prevent and Manage VTE (venous thromboembolism) Risk  Recent Flowsheet Documentation  Taken 12/22/2020 2100 by Khadijah Ramos RN  VTE Prevention/Management: (see MAR) --  Taken 12/22/2020 2019 by Khadijah Ramos RN  VTE Prevention/Management: (see MAR) --  Goal: Optimal Comfort and Wellbeing  Outcome: Ongoing, Progressing  Intervention: Provide Person-Centered Care  Recent Flowsheet Documentation  Taken 12/22/2020 2100 by Khadijah Ramos RN  Trust Relationship/Rapport:   care explained   choices provided   emotional support provided   empathic listening provided   questions answered   questions encouraged   reassurance provided   thoughts/feelings acknowledged  Goal: Readiness for Transition of Care  Outcome: Ongoing, Progressing   Goal Outcome Evaluation:  Plan of Care Reviewed With: patient  Progress: no change

## 2020-12-25 LAB
QT INTERVAL: 338 MS
QT INTERVAL: 364 MS
QTC INTERVAL: 422 MS
QTC INTERVAL: 425 MS

## 2020-12-27 ENCOUNTER — APPOINTMENT (OUTPATIENT)
Dept: CT IMAGING | Facility: HOSPITAL | Age: 65
End: 2020-12-27

## 2020-12-27 ENCOUNTER — HOSPITAL ENCOUNTER (EMERGENCY)
Facility: HOSPITAL | Age: 65
Discharge: HOME | End: 2020-12-27
Attending: EMERGENCY MEDICINE | Admitting: EMERGENCY MEDICINE

## 2020-12-27 VITALS
BODY MASS INDEX: 37.67 KG/M2 | TEMPERATURE: 98.7 F | RESPIRATION RATE: 18 BRPM | HEIGHT: 67 IN | WEIGHT: 240 LBS | DIASTOLIC BLOOD PRESSURE: 74 MMHG | SYSTOLIC BLOOD PRESSURE: 130 MMHG | OXYGEN SATURATION: 98 % | HEART RATE: 74 BPM

## 2020-12-27 DIAGNOSIS — R09.1 PLEURISY: Primary | ICD-10-CM

## 2020-12-27 LAB
A-A DO2: 28.9 MMHG (ref 0–300)
ALBUMIN SERPL-MCNC: 3.77 G/DL (ref 3.5–5.2)
ALBUMIN/GLOB SERPL: 0.8 G/DL
ALP SERPL-CCNC: 95 U/L (ref 39–117)
ALT SERPL W P-5'-P-CCNC: 25 U/L (ref 1–33)
ANION GAP SERPL CALCULATED.3IONS-SCNC: 14.9 MMOL/L (ref 5–15)
APTT PPP: 33.2 SECONDS (ref 25.6–35.3)
ARTERIAL PATENCY WRIST A: POSITIVE
AST SERPL-CCNC: 17 U/L (ref 1–32)
ATMOSPHERIC PRESS: 730 MMHG
BACTERIA UR QL AUTO: ABNORMAL /HPF
BASE EXCESS BLDA CALC-SCNC: 2.9 MMOL/L (ref 0–2)
BASOPHILS # BLD AUTO: 0.12 10*3/MM3 (ref 0–0.2)
BASOPHILS NFR BLD AUTO: 0.6 % (ref 0–1.5)
BDY SITE: ABNORMAL
BILIRUB SERPL-MCNC: 0.7 MG/DL (ref 0–1.2)
BILIRUB UR QL STRIP: NEGATIVE
BODY TEMPERATURE: 0 C
BUN SERPL-MCNC: 32 MG/DL (ref 8–23)
BUN/CREAT SERPL: 31.1 (ref 7–25)
CALCIUM SPEC-SCNC: 9 MG/DL (ref 8.6–10.5)
CHLORIDE SERPL-SCNC: 95 MMOL/L (ref 98–107)
CLARITY UR: CLEAR
CO2 BLDA-SCNC: 28 MMOL/L (ref 22–33)
CO2 SERPL-SCNC: 24.1 MMOL/L (ref 22–29)
COHGB MFR BLD: 2.2 % (ref 0–5)
COLOR UR: YELLOW
CREAT SERPL-MCNC: 1.03 MG/DL (ref 0.57–1)
DEPRECATED RDW RBC AUTO: 41.1 FL (ref 37–54)
EOSINOPHIL # BLD AUTO: 0.05 10*3/MM3 (ref 0–0.4)
EOSINOPHIL NFR BLD AUTO: 0.3 % (ref 0.3–6.2)
ERYTHROCYTE [DISTWIDTH] IN BLOOD BY AUTOMATED COUNT: 11.8 % (ref 12.3–15.4)
GFR SERPL CREATININE-BSD FRML MDRD: 54 ML/MIN/1.73
GLOBULIN UR ELPH-MCNC: 4.5 GM/DL
GLUCOSE SERPL-MCNC: 177 MG/DL (ref 65–99)
GLUCOSE UR STRIP-MCNC: NEGATIVE MG/DL
HCO3 BLDA-SCNC: 26.8 MMOL/L (ref 20–26)
HCT VFR BLD AUTO: 42.1 % (ref 34–46.6)
HCT VFR BLD CALC: 42.2 % (ref 38–51)
HGB BLD-MCNC: 13.7 G/DL (ref 12–15.9)
HGB BLDA-MCNC: 13.8 G/DL (ref 13.5–17.5)
HGB UR QL STRIP.AUTO: ABNORMAL
HOLD SPECIMEN: NORMAL
HOLD SPECIMEN: NORMAL
HYALINE CASTS UR QL AUTO: ABNORMAL /LPF
IMM GRANULOCYTES # BLD AUTO: 0.14 10*3/MM3 (ref 0–0.05)
IMM GRANULOCYTES NFR BLD AUTO: 0.7 % (ref 0–0.5)
INHALED O2 CONCENTRATION: 21 %
INR PPP: 1.44 (ref 0.9–1.1)
KETONES UR QL STRIP: NEGATIVE
LEUKOCYTE ESTERASE UR QL STRIP.AUTO: NEGATIVE
LYMPHOCYTES # BLD AUTO: 1.82 10*3/MM3 (ref 0.7–3.1)
LYMPHOCYTES NFR BLD AUTO: 9.4 % (ref 19.6–45.3)
Lab: ABNORMAL
MCH RBC QN AUTO: 31.1 PG (ref 26.6–33)
MCHC RBC AUTO-ENTMCNC: 32.5 G/DL (ref 31.5–35.7)
MCV RBC AUTO: 95.5 FL (ref 79–97)
METHGB BLD QL: 0.1 % (ref 0–3)
MODALITY: ABNORMAL
MONOCYTES # BLD AUTO: 1.41 10*3/MM3 (ref 0.1–0.9)
MONOCYTES NFR BLD AUTO: 7.3 % (ref 5–12)
NEUTROPHILS NFR BLD AUTO: 15.9 10*3/MM3 (ref 1.7–7)
NEUTROPHILS NFR BLD AUTO: 81.7 % (ref 42.7–76)
NITRITE UR QL STRIP: NEGATIVE
NOTE: ABNORMAL
NRBC BLD AUTO-RTO: 0 /100 WBC (ref 0–0.2)
NT-PROBNP SERPL-MCNC: 92.4 PG/ML (ref 0–900)
OXYHGB MFR BLDV: 93.1 % (ref 94–99)
PCO2 BLDA: 38 MM HG (ref 35–45)
PCO2 TEMP ADJ BLD: ABNORMAL MM[HG]
PH BLDA: 7.46 PH UNITS (ref 7.35–7.45)
PH UR STRIP.AUTO: <=5 [PH] (ref 5–8)
PH, TEMP CORRECTED: ABNORMAL
PLATELET # BLD AUTO: 297 10*3/MM3 (ref 140–450)
PMV BLD AUTO: 10.3 FL (ref 6–12)
PO2 BLDA: 71.7 MM HG (ref 83–108)
PO2 TEMP ADJ BLD: ABNORMAL MM[HG]
POTASSIUM SERPL-SCNC: 4.1 MMOL/L (ref 3.5–5.2)
PROT SERPL-MCNC: 8.3 G/DL (ref 6–8.5)
PROT UR QL STRIP: NEGATIVE
PROTHROMBIN TIME: 17.4 SECONDS (ref 11.9–14.1)
QT INTERVAL: 332 MS
QTC INTERVAL: 406 MS
RBC # BLD AUTO: 4.41 10*6/MM3 (ref 3.77–5.28)
RBC # UR: ABNORMAL /HPF
REF LAB TEST METHOD: ABNORMAL
SAO2 % BLDCOA: 95.3 % (ref 94–99)
SODIUM SERPL-SCNC: 134 MMOL/L (ref 136–145)
SP GR UR STRIP: >1.03 (ref 1–1.03)
SQUAMOUS #/AREA URNS HPF: ABNORMAL /HPF
TROPONIN T SERPL-MCNC: <0.01 NG/ML (ref 0–0.03)
UROBILINOGEN UR QL STRIP: ABNORMAL
VENTILATOR MODE: ABNORMAL
WBC # BLD AUTO: 19.44 10*3/MM3 (ref 3.4–10.8)
WBC UR QL AUTO: ABNORMAL /HPF
WHOLE BLOOD HOLD SPECIMEN: NORMAL
WHOLE BLOOD HOLD SPECIMEN: NORMAL

## 2020-12-27 PROCEDURE — 96361 HYDRATE IV INFUSION ADD-ON: CPT

## 2020-12-27 PROCEDURE — 25010000002 DEXAMETHASONE PER 1 MG: Performed by: EMERGENCY MEDICINE

## 2020-12-27 PROCEDURE — 84484 ASSAY OF TROPONIN QUANT: CPT | Performed by: EMERGENCY MEDICINE

## 2020-12-27 PROCEDURE — 83880 ASSAY OF NATRIURETIC PEPTIDE: CPT | Performed by: EMERGENCY MEDICINE

## 2020-12-27 PROCEDURE — 36600 WITHDRAWAL OF ARTERIAL BLOOD: CPT

## 2020-12-27 PROCEDURE — 93010 ELECTROCARDIOGRAM REPORT: CPT | Performed by: INTERNAL MEDICINE

## 2020-12-27 PROCEDURE — 82805 BLOOD GASES W/O2 SATURATION: CPT

## 2020-12-27 PROCEDURE — 99285 EMERGENCY DEPT VISIT HI MDM: CPT

## 2020-12-27 PROCEDURE — 96374 THER/PROPH/DIAG INJ IV PUSH: CPT

## 2020-12-27 PROCEDURE — 82375 ASSAY CARBOXYHB QUANT: CPT

## 2020-12-27 PROCEDURE — 81001 URINALYSIS AUTO W/SCOPE: CPT | Performed by: EMERGENCY MEDICINE

## 2020-12-27 PROCEDURE — 25010000002 ONDANSETRON PER 1 MG: Performed by: EMERGENCY MEDICINE

## 2020-12-27 PROCEDURE — 83050 HGB METHEMOGLOBIN QUAN: CPT

## 2020-12-27 PROCEDURE — 96375 TX/PRO/DX INJ NEW DRUG ADDON: CPT

## 2020-12-27 PROCEDURE — 71275 CT ANGIOGRAPHY CHEST: CPT

## 2020-12-27 PROCEDURE — 0 IOPAMIDOL PER 1 ML: Performed by: EMERGENCY MEDICINE

## 2020-12-27 PROCEDURE — 85730 THROMBOPLASTIN TIME PARTIAL: CPT | Performed by: EMERGENCY MEDICINE

## 2020-12-27 PROCEDURE — 85025 COMPLETE CBC W/AUTO DIFF WBC: CPT | Performed by: EMERGENCY MEDICINE

## 2020-12-27 PROCEDURE — 25010000002 MORPHINE PER 10 MG: Performed by: EMERGENCY MEDICINE

## 2020-12-27 PROCEDURE — 85610 PROTHROMBIN TIME: CPT | Performed by: EMERGENCY MEDICINE

## 2020-12-27 PROCEDURE — 71275 CT ANGIOGRAPHY CHEST: CPT | Performed by: RADIOLOGY

## 2020-12-27 PROCEDURE — 93005 ELECTROCARDIOGRAM TRACING: CPT | Performed by: EMERGENCY MEDICINE

## 2020-12-27 PROCEDURE — 25010000002 KETOROLAC TROMETHAMINE PER 15 MG: Performed by: EMERGENCY MEDICINE

## 2020-12-27 PROCEDURE — 80053 COMPREHEN METABOLIC PANEL: CPT | Performed by: EMERGENCY MEDICINE

## 2020-12-27 RX ORDER — SODIUM CHLORIDE 9 MG/ML
125 INJECTION, SOLUTION INTRAVENOUS CONTINUOUS
Status: DISCONTINUED | OUTPATIENT
Start: 2020-12-27 | End: 2020-12-27 | Stop reason: HOSPADM

## 2020-12-27 RX ORDER — DEXAMETHASONE SODIUM PHOSPHATE 10 MG/ML
8 INJECTION INTRAMUSCULAR; INTRAVENOUS ONCE
Status: COMPLETED | OUTPATIENT
Start: 2020-12-27 | End: 2020-12-27

## 2020-12-27 RX ORDER — ONDANSETRON 2 MG/ML
4 INJECTION INTRAMUSCULAR; INTRAVENOUS ONCE
Status: COMPLETED | OUTPATIENT
Start: 2020-12-27 | End: 2020-12-27

## 2020-12-27 RX ORDER — KETOROLAC TROMETHAMINE 30 MG/ML
15 INJECTION, SOLUTION INTRAMUSCULAR; INTRAVENOUS ONCE
Status: COMPLETED | OUTPATIENT
Start: 2020-12-27 | End: 2020-12-27

## 2020-12-27 RX ORDER — DEXAMETHASONE 4 MG/1
4 TABLET ORAL 2 TIMES DAILY WITH MEALS
Qty: 10 TABLET | Refills: 0 | Status: SHIPPED | OUTPATIENT
Start: 2020-12-27

## 2020-12-27 RX ORDER — OXYCODONE HYDROCHLORIDE AND ACETAMINOPHEN 5; 325 MG/1; MG/1
1 TABLET ORAL EVERY 6 HOURS PRN
Qty: 10 TABLET | Refills: 0 | Status: SHIPPED | OUTPATIENT
Start: 2020-12-27

## 2020-12-27 RX ORDER — DOXYCYCLINE 100 MG/1
100 CAPSULE ORAL EVERY 12 HOURS
Qty: 20 CAPSULE | Refills: 0 | Status: SHIPPED | OUTPATIENT
Start: 2020-12-27

## 2020-12-27 RX ADMIN — DEXAMETHASONE SODIUM PHOSPHATE 8 MG: 10 INJECTION INTRAMUSCULAR; INTRAVENOUS at 14:56

## 2020-12-27 RX ADMIN — SODIUM CHLORIDE 500 ML: 9 INJECTION, SOLUTION INTRAVENOUS at 10:55

## 2020-12-27 RX ADMIN — MORPHINE SULFATE 4 MG: 4 INJECTION, SOLUTION INTRAMUSCULAR; INTRAVENOUS at 10:55

## 2020-12-27 RX ADMIN — IOPAMIDOL 70 ML: 755 INJECTION, SOLUTION INTRAVENOUS at 11:35

## 2020-12-27 RX ADMIN — ONDANSETRON 4 MG: 2 INJECTION INTRAMUSCULAR; INTRAVENOUS at 10:55

## 2020-12-27 RX ADMIN — SODIUM CHLORIDE 125 ML/HR: 9 INJECTION, SOLUTION INTRAVENOUS at 10:53

## 2020-12-27 RX ADMIN — KETOROLAC TROMETHAMINE 15 MG: 30 INJECTION, SOLUTION INTRAMUSCULAR; INTRAVENOUS at 14:56

## 2020-12-27 NOTE — DISCHARGE INSTRUCTIONS
Home care family.  Rest.  Drink lots of fluids.  Take your medication as prescribed.  See your primary care doctor in the office in the next 2 to 3 days.  Return to the emergency department right away symptoms worsen/any problems.

## 2020-12-27 NOTE — ED PROVIDER NOTES
Subjective   Patient is a 65-year-old female who was admitted here December 21-23 with bilateral pulmonary emboli[facility]  She states that on the 21st when she first had her emboli, she had pain in her left chest and shoulder, but that on the 24th, the day after she got home, she developed pain in her right chest and right shoulder feeling exactly the same way.  It hurts throughout her chest, anterior and posterior, worsened by deep breathing and coughing.  She feels mildly short of breath.  She denies fever, chills, hemoptysis, abdominal pain, vomiting, hematemesis, hematochezia melena, syncope or near syncope, focal numbness or weakness, other symptoms or other complaints.  She reports that she has been taking her medications as directed, has not missed any doses.          Review of Systems   Constitutional: Negative for chills, diaphoresis and fever.   HENT: Negative for ear pain, sore throat and trouble swallowing.    Eyes: Negative for photophobia and pain.   Respiratory: Positive for shortness of breath. Negative for wheezing and stridor.    Cardiovascular: Positive for chest pain. Negative for palpitations.   Gastrointestinal: Negative for abdominal distention, abdominal pain, blood in stool, diarrhea, nausea and vomiting.   Endocrine: Negative for polydipsia and polyphagia.   Genitourinary: Negative for difficulty urinating and flank pain.   Musculoskeletal: Negative for back pain, neck pain and neck stiffness.   Skin: Negative for color change and pallor.   Neurological: Negative for seizures, syncope and speech difficulty.   Psychiatric/Behavioral: Negative for confusion.   All other systems reviewed and are negative.      Past Medical History:   Diagnosis Date   • Arthritis    • COPD (chronic obstructive pulmonary disease) (CMS/[facility])    • Coronary artery disease s/p statnting int he past 12/22/2020   • Diabetes (CMS/[facility])    • Diabetes mellitus (CMS/[facility])    • Essential hypertension 12/22/2020    • Heart & renal disease, hypertensive malignant with heart failure (CMS/HCC)    • Heart attack (CMS/HCC)    • History of bronchitis    • Hyperlipidemia 12/22/2020   • Low back pain    • Obesity (BMI 30-39.9) 12/22/2020   • Pneumonia    • Pulmonary embolism (CMS/HCC)        Allergies   Allergen Reactions   • Codeine Other (See Comments)     Lethargic       Past Surgical History:   Procedure Laterality Date   • CHOLECYSTECTOMY     • CORONARY STENT PLACEMENT  02/08/2013    3.0x23mm xieuce mid RCA done at ; LAD [id number] Ref #[id number] done at [facility] 12/29/12   • HYSTERECTOMY         Family History   Problem Relation Age of Onset   • Cancer Mother    • No Known Problems Father        Social History     Socioeconomic History   • Marital status:      Spouse name: Not on file   • Number of children: Not on file   • Years of education: Not on file   • Highest education level: Not on file   Occupational History   • Occupation: Radiology Tech   Tobacco Use   • Smoking status: Current Every Day Smoker     Packs/day: 0.50     Years: 30.00     Pack years: 15.00     Types: Cigarettes   • Smokeless tobacco: Never Used   Substance and Sexual Activity   • Alcohol use: No     Frequency: Never   • Drug use: No   • Sexual activity: Defer           Objective   Physical Exam  Vitals signs and nursing note reviewed.   Constitutional:       Appearance: She is well-developed. She is not toxic-appearing or diaphoretic.      Comments: Patient appears anxious and uncomfortable.   HENT:      Head: Normocephalic and atraumatic.   Eyes:      General: No scleral icterus.     Pupils: Pupils are equal, round, and reactive to light.   Neck:      Musculoskeletal: Normal range of motion and neck supple. No neck rigidity.      Trachea: No tracheal deviation.   Cardiovascular:      Rate and Rhythm: Normal rate and regular rhythm.   Pulmonary:      Effort: Pulmonary effort is normal. No respiratory distress.      Breath sounds:  Normal breath sounds.   Chest:      Chest wall: No tenderness.   Abdominal:      General: Bowel sounds are normal.      Palpations: Abdomen is soft.      Tenderness: There is no abdominal tenderness. There is no guarding or rebound.   Musculoskeletal: Normal range of motion.         General: No tenderness.      Right lower leg: She exhibits no tenderness. No edema.      Left lower leg: She exhibits no tenderness. No edema.   Skin:     General: Skin is warm and dry.      Capillary Refill: Capillary refill takes less than 2 seconds.      Coloration: Skin is not pale.   Neurological:      General: No focal deficit present.      Mental Status: She is alert and oriented to person, place, and time.      GCS: GCS eye subscore is 4. GCS verbal subscore is 5. GCS motor subscore is 6.      Sensory: No sensory deficit.      Motor: No abnormal muscle tone.      Coordination: Coordination normal.   Psychiatric:         Mood and Affect: Mood is anxious.         Behavior: Behavior normal.         Procedures  EKG #1 at 1004 shows sinus rhythm with a rate of 90.  TN interval 142, QRS duration 66, QTc 406 ms.  Baseline artifact.  No apparent acute ischemia.  No evidence for STEMI.  EKG #2 at 1437 shows sinus rhythm with a rate of 85.  TN interval 134, QRS duration 68, QTc 437 ms.  No apparent acute ischemia, no evidence for STEMI.  Ct Head Without Contrast    Result Date: 12/22/2020  Narrative: CT Head WO HISTORY: Pain today TECHNIQUE: Axial unenhanced head CT. Radiation dose reduction techniques included automated exposure control or exposure modulation based on body size. Count of known CT and cardiac nuc med studies performed in previous 12 months: 0. Time of scan: A 40 9:00 AM COMPARISON: None. FINDINGS: No intracranial hemorrhage, mass, or infarct. No hydrocephalus or extra-axial fluid collection. Brain parenchymal density is normal. The skull base, calvarium, and extracranial soft tissues are normal.     Impression: Normal,  negative unenhanced head CT. Signer Name: Papito Gordon MD  Signed: 12/22/2020 1:19 AM  Workstation Name: [facility]    Ct Cervical Spine Without Contrast    Result Date: 12/22/2020  Narrative: CT Spine Cervical WO INDICATION: Pain in neck and left shoulder this morning without trauma. TECHNIQUE: CT of the cervical spine without IV contrast. Coronal and sagittal reconstructions were obtained.  Radiation dose reduction techniques included automated exposure control or exposure modulation based on body size. Count of known CT and cardiac nuc med studies performed in previous 12 months: 0. COMPARISON: None available. FINDINGS: The alignment is normal. There is mild disc space narrowing at C4-5, C5-6 and C6-7. There is no fracture. There is mild spinal stenosis is 5 6 and C6-7.     Impression: Degenerative changes mid cervical spine with mild spinal stenosis C5-6 and C6-7. No acute findings Signer Name: Papito Gordon MD  Signed: 12/22/2020 1:25 AM  Workstation Name: [facility]    Xr Chest 1 View    Result Date: 12/21/2020  Narrative: PROCEDURE: CR Chest 1 Vw COMPARISON:  No relevant comparison or correlation studies available at time of dictation. INDICATIONS: chest pain Relevant clinical info: PT IS C/O PAIN TO LEFT SHOULDER, BEGAN ON WEDNESDAY, , NOW IT HURTS ACROSS HER BACK INTO HER RIGHT SHOULDER, OCCASIONALLY IN CHEST, ALSO FEELING NAUSEA, DIARRHEA, VOMITING, HEADACHE, COUGH TECHNIQUE: Single AP  view of the chest FINDINGS:  Cardiomediastinal silhouette is within normal limits given projection.  Lungs are relatively well inflated and clear. Osseous structures are intact.     Impression: No acute disease.  Signer Name: Re Tenorio MD  Signed: 12/21/2020 9:59 PM  Workstation [facility]    Ct Chest Pulmonary Embolism    Result Date: 12/27/2020  Narrative: EXAM:   CT Angiography Chest With Intravenous  Contrast  EXAM DATE:   12/27/2020 11:17 AM  CLINICAL HISTORY:   Recent bilateral PEs, increased chest pain and shortness of breath  TECHNIQUE:   Axial computed tomographic angiography images of the chest with intravenous contrast.  This CT exam was performed using one or more of the following dose reduction techniques:  automated exposure control, adjustment of the mA and/or kV according to patient size, and/or use of iterative reconstruction technique.   MIP reconstructed images were created and reviewed.  COMPARISON:   No relevant prior studies available.  FINDINGS:   Pulmonary arteries:  Unremarkable.  No pulmonary embolism.   Aorta:  No acute findings.  No thoracic aortic aneurysm.   Lungs:  Unremarkable.  No mass.  No consolidation.   Pleural space:  Unremarkable.  No significant effusion.  No pneumothorax.   Heart:  Unremarkable.  No cardiomegaly.  No significant pericardial effusion.  No evidence of RV dysfunction.   Bones/joints:  No acute fracture.  No dislocation.   Soft tissues:  Unremarkable.   Lymph nodes:  Unremarkable.  No enlarged lymph nodes.      Impression:   No pulmonary embolism.  This report was finalized on 12/27/2020 11:54 AM by Dr. Ladarius Maki MD.      Ct Chest Pulmonary Embolism    Result Date: 12/22/2020  Narrative: CT CHEST PULMONARY EMBOLISM W CONTRAST INDICATION: Chest pain and shortness of breath this morning TECHNIQUE: CT angiogram of the chest with IV contrast. 3-D reconstructions were obtained and reviewed.   Radiation dose reduction techniques included automated exposure control or exposure modulation based on body size. Count of known CT and cardiac nuc med studies performed in previous 12 months: 0. COMPARISON: None available. FINDINGS: The lungs are clear. The thyroid gland is normal. The aorta is normal in size. There is no dissection. The pulmonary arteries are not optimally opacified. The pulmonary veins are slightly denser than the pulmonary arteries.. There does appear to  be some filling defects in the right lower lobe pulmonary arteries best seen on images 200 through to 224 of the coronal series and there appears be a small left lower lobe pulmonary embolus on image 200. The visualized portions upper abdomen are unremarkable. Bones are unremarkable.     Impression: small bilateral nonocclusive lower lobe pulmonary emboli. The results been discussed with Dr. Wade in the ER Otherwise normal Signer Name: Papito Gordon MD  Signed: 12/22/2020 1:36 AM  Workstation Name: [facility]  [facility]    Us Venous Doppler Lower Extremity Bilateral (duplex)    Result Date: 12/22/2020  Narrative: EXAMINATION: US VENOUS DOPPLER LOWER EXTREMITY BILATERAL (DUPLEX)-  CLINICAL INDICATION:  PE, look for DVT; I26.99-Other pulmonary embolism without acute cor pulmonale  TECHNIQUE: Multiplanar gray scale and Doppler vascular sonographic imaging of the deep veins of BILATERAL lower extremity, without and with compression.  COMPARISON: NONE  FINDINGS: Deep Veins: The visualized deep veins demonstrate flow and are compressible. No evidence of deep venous thrombosis. Superficial veins: Unremarkable. Saphenofemoral junction is patent without thrombus. Soft tissues: Within normal limits.      Impression: No DVT.  This report was finalized on 12/22/2020 11:46 AM by Dr. Peter Vincent MD.      Ct Upper Extremity Left Without Contrast    Result Date: 12/22/2020  Narrative: CT UE LT WO INDICATION:  Left shoulder pain this morning with no known injury. TECHNIQUE: CT of the shoulder without contrast. Coronal and sagittal reconstructions were obtained.  Radiation dose reduction techniques included automated exposure control or exposure modulation based on body size. Radiation audit for number of CT and nuclear cardiology exams performed in the last year: 0.  COMPARISON:  None available. FINDINGS: There is no fracture or dislocation visible. There is degenerative change at the acromioclavicular  joint. Soft tissues are normal.     Impression: There is some AC joint degenerative change otherwise the left shoulder appears normal. Signer Name: Papito Gordon MD  Signed: 12/22/2020 1:27 AM  Workstation Name: [facility]  [facility]    Results for orders placed or performed during the hospital encounter of 12/27/20   Comprehensive Metabolic Panel    Specimen: Blood   Result Value Ref Range    Glucose 177 (H) 65 - 99 mg/dL    BUN 32 (H) 8 - 23 mg/dL    Creatinine 1.03 (H) 0.57 - 1.00 mg/dL    Sodium 134 (L) 136 - 145 mmol/L    Potassium 4.1 3.5 - 5.2 mmol/L    Chloride 95 (L) 98 - 107 mmol/L    CO2 24.1 22.0 - 29.0 mmol/L    Calcium 9.0 8.6 - 10.5 mg/dL    Total Protein 8.3 6.0 - 8.5 g/dL    Albumin 3.77 3.50 - 5.20 g/dL    ALT (SGPT) 25 1 - 33 U/L    AST (SGOT) 17 1 - 32 U/L    Alkaline Phosphatase 95 39 - 117 U/L    Total Bilirubin 0.7 0.0 - 1.2 mg/dL    eGFR Non African Amer 54 (L) >60 mL/min/1.73    Globulin 4.5 gm/dL    A/G Ratio 0.8 g/dL    BUN/Creatinine Ratio 31.1 (H) 7.0 - 25.0    Anion Gap 14.9 5.0 - 15.0 mmol/L   Protime-INR    Specimen: Blood   Result Value Ref Range    Protime 17.4 (H) 11.9 - 14.1 Seconds    INR 1.44 (H) 0.90 - 1.10   aPTT    Specimen: Blood   Result Value Ref Range    PTT 33.2 25.6 - 35.3 seconds   BNP    Specimen: Blood   Result Value Ref Range    proBNP 92.4 0.0 - 900.0 pg/mL   Troponin    Specimen: Blood   Result Value Ref Range    Troponin T <0.010 0.000 - 0.030 ng/mL   CBC Auto Differential    Specimen: Blood   Result Value Ref Range    WBC 19.44 (H) 3.40 - 10.80 10*3/mm3    RBC 4.41 3.77 - 5.28 10*6/mm3    Hemoglobin 13.7 12.0 - 15.9 g/dL    Hematocrit 42.1 34.0 - 46.6 %    MCV 95.5 79.0 - 97.0 fL    MCH 31.1 26.6 - 33.0 pg    MCHC 32.5 31.5 - 35.7 g/dL    RDW 11.8 (L) 12.3 - 15.4 %    RDW-SD 41.1 37.0 - 54.0 fl    MPV 10.3 6.0 - 12.0 fL    Platelets 297 140 - 450 10*3/mm3    Neutrophil % 81.7 (H) 42.7 - 76.0 %    Lymphocyte % 9.4 (L) 19.6 - 45.3 %     Monocyte % 7.3 5.0 - 12.0 %    Eosinophil % 0.3 0.3 - 6.2 %    Basophil % 0.6 0.0 - 1.5 %    Immature Grans % 0.7 (H) 0.0 - 0.5 %    Neutrophils, Absolute 15.90 (H) 1.70 - 7.00 10*3/mm3    Lymphocytes, Absolute 1.82 0.70 - 3.10 10*3/mm3    Monocytes, Absolute 1.41 (H) 0.10 - 0.90 10*3/mm3    Eosinophils, Absolute 0.05 0.00 - 0.40 10*3/mm3    Basophils, Absolute 0.12 0.00 - 0.20 10*3/mm3    Immature Grans, Absolute 0.14 (H) 0.00 - 0.05 10*3/mm3    nRBC 0.0 0.0 - 0.2 /100 WBC   Blood Gas, Arterial With Co-Ox    Specimen: Arterial Blood   Result Value Ref Range    Site Left Radial     Brock's Test Positive     pH, Arterial 7.457 (H) 7.350 - 7.450 pH units    pCO2, Arterial 38.0 35.0 - 45.0 mm Hg    pO2, Arterial 71.7 (L) 83.0 - 108.0 mm Hg    HCO3, Arterial 26.8 (H) 20.0 - 26.0 mmol/L    Base Excess, Arterial 2.9 (H) 0.0 - 2.0 mmol/L    O2 Saturation, Arterial 95.3 94.0 - 99.0 %    Hemoglobin, Blood Gas 13.8 13.5 - 17.5 g/dL    Hematocrit, Blood Gas 42.2 38.0 - 51.0 %    Oxyhemoglobin 93.1 (L) 94 - 99 %    Methemoglobin 0.10 0.00 - 3.00 %    Carboxyhemoglobin 2.2 0 - 5 %    A-a Gradiant 28.9 0.0 - 300.0 mmHg    CO2 Content 28.0 22 - 33 mmol/L    Temperature 0.0 C    Barometric Pressure for Blood Gas 730 mmHg    Modality Room Air     FIO2 21 %    Ventilator Mode NA     Note      Collected by [id number]     pH, Temp Corrected      pCO2, Temperature Corrected      pO2, Temperature Corrected     Urinalysis With Microscopic If Indicated (No Culture) - Urine, Clean Catch    Specimen: Urine, Clean Catch   Result Value Ref Range    Color, UA Yellow Yellow, Straw    Appearance, UA Clear Clear    pH, UA <=5.0 5.0 - 8.0    Specific Gravity, UA >1.030 (H) 1.005 - 1.030    Glucose, UA Negative Negative    Ketones, UA Negative Negative    Bilirubin, UA Negative Negative    Blood, UA Trace (A) Negative    Protein, UA Negative Negative    Leuk Esterase, UA Negative Negative    Nitrite, UA Negative Negative    Urobilinogen, UA 0.2 E.U./dL  0.2 - 1.0 E.U./dL   Urinalysis, Microscopic Only - Urine, Clean Catch    Specimen: Urine, Clean Catch   Result Value Ref Range    RBC, UA 3-5 (A) None Seen, 0-2 /HPF    WBC, UA 0-2 None Seen, 0-2 /HPF    Bacteria, UA None Seen None Seen /HPF    Squamous Epithelial Cells, UA 0-2 None Seen, 0-2 /HPF    Hyaline Casts, UA None Seen None Seen /LPF    Methodology Automated Microscopy    ECG 12 Lead   Result Value Ref Range    QT Interval 332 ms    QTC Interval 406 ms   Light Blue Top   Result Value Ref Range    Extra Tube hold for add-on    Green Top (Gel)   Result Value Ref Range    Extra Tube Hold for add-ons.    Lavender Top   Result Value Ref Range    Extra Tube hold for add-on    Gold Top - SST   Result Value Ref Range    Extra Tube Hold for add-ons.                   ED Course  ED Course as of Dec 27 1724   Sun Dec 27, 2020   1614 Patient's emergency department stay has not been complicated.  She has done well.  She has been much more comfortable with her medications.  Patient, her family and I discussed all of her test results and her plan of care.  They voiced understanding and agreement.  She will follow-up closely with her PCP.  She will return the emergency department right away if her symptoms worsen or she has any problems.    [CM]      ED Course User Index  [CM] Tim Romero MD                                           [facility]    Final diagnoses:   Pleurisy             Please note that portions of this note were completed with a voice recognition program.        Tim Romero MD  12/27/20 [id number]

## 2020-12-27 NOTE — ED NOTES
Pt discharged via wheelchair to car with spouse. NAD noted. Alert and oriented X4. Prescriptions given. Denied discharge  Vitals.     Mateo Islas, RN  12/27/20 [id number]

## 2020-12-28 LAB
QT INTERVAL: 368 MS
QTC INTERVAL: 437 MS

## 2021-02-09 ENCOUNTER — APPOINTMENT (OUTPATIENT)
Dept: GENERAL RADIOLOGY | Facility: HOSPITAL | Age: 66
End: 2021-02-09

## 2021-02-09 ENCOUNTER — APPOINTMENT (OUTPATIENT)
Dept: ULTRASOUND IMAGING | Facility: HOSPITAL | Age: 66
End: 2021-02-09

## 2021-02-09 ENCOUNTER — HOSPITAL ENCOUNTER (EMERGENCY)
Facility: HOSPITAL | Age: 66
Discharge: HOME | End: 2021-02-09
Attending: EMERGENCY MEDICINE | Admitting: STUDENT IN AN ORGANIZED HEALTH CARE EDUCATION/TRAINING PROGRAM

## 2021-02-09 VITALS
HEIGHT: 67 IN | SYSTOLIC BLOOD PRESSURE: 132 MMHG | RESPIRATION RATE: 18 BRPM | WEIGHT: 245 LBS | HEART RATE: 107 BPM | DIASTOLIC BLOOD PRESSURE: 76 MMHG | TEMPERATURE: 98.7 F | OXYGEN SATURATION: 95 % | BODY MASS INDEX: 38.45 KG/M2

## 2021-02-09 DIAGNOSIS — M79.671 RIGHT FOOT PAIN: Primary | ICD-10-CM

## 2021-02-09 PROCEDURE — 93971 EXTREMITY STUDY: CPT | Performed by: RADIOLOGY

## 2021-02-09 PROCEDURE — 73630 X-RAY EXAM OF FOOT: CPT | Performed by: RADIOLOGY

## 2021-02-09 PROCEDURE — 73610 X-RAY EXAM OF ANKLE: CPT | Performed by: RADIOLOGY

## 2021-02-09 PROCEDURE — 73630 X-RAY EXAM OF FOOT: CPT

## 2021-02-09 PROCEDURE — 93971 EXTREMITY STUDY: CPT

## 2021-02-09 PROCEDURE — 73610 X-RAY EXAM OF ANKLE: CPT

## 2021-02-09 PROCEDURE — 99283 EMERGENCY DEPT VISIT LOW MDM: CPT

## 2021-02-09 NOTE — ED PROVIDER NOTES
Subjective   Patient is a 65-year-old female with COPD, diabetes, arthritis, hypertension, hyperlipidemia, coronary artery disease, and history of DVT that presents the ED with complaints of right foot pain.  She states is been ongoing now for approximately 3 weeks.  She is complaining of swelling in her foot that is radiating up into her ankle and leg.  She states it is worse with ambulation.  She is concerned that she may have another DVT.  She denies chest pain, shortness of breath, fever, chills, nausea, vomiting, abdominal pain, or dysuria.      History provided by:  Patient   used: No    Foot Pain  Location:  Right foot  Quality:  Throbbing  Severity:  Moderate  Onset quality:  Sudden  Duration:  5 days  Timing:  Constant  Progression:  Worsening  Chronicity:  New  Context:  Right leg is swelling and going into the foot  Associated symptoms: no abdominal pain, no chest pain, no congestion, no cough, no diarrhea, no ear pain, no fatigue, no fever, no headaches, no loss of consciousness, no myalgias, no nausea, no rash, no rhinorrhea, no shortness of breath, no sore throat, no vomiting and no wheezing        Review of Systems   Constitutional: Negative.  Negative for fatigue and fever.   HENT: Negative.  Negative for congestion, drooling, ear discharge, ear pain, nosebleeds, postnasal drip, rhinorrhea, sinus pressure, sinus pain, sneezing, sore throat, tinnitus and trouble swallowing.    Eyes: Negative.  Negative for photophobia, pain, discharge, redness and itching.   Respiratory: Negative.  Negative for cough, shortness of breath and wheezing.    Cardiovascular: Positive for leg swelling. Negative for chest pain.   Gastrointestinal: Negative.  Negative for abdominal distention, abdominal pain, diarrhea, nausea and vomiting.   Endocrine: Negative.    Genitourinary: Negative.  Negative for difficulty urinating, dysuria, flank pain, frequency and urgency.   Musculoskeletal: Positive for  arthralgias. Negative for back pain, gait problem, joint swelling, myalgias, neck pain and neck stiffness.   Skin: Negative.  Negative for rash.   Neurological: Negative.  Negative for dizziness, loss of consciousness, syncope, facial asymmetry, light-headedness, numbness and headaches.   Psychiatric/Behavioral: Negative.  Negative for confusion.   All other systems reviewed and are negative.      Past Medical History:   Diagnosis Date   • Arthritis    • COPD (chronic obstructive pulmonary disease) (CMS/[facility])    • Coronary artery disease s/p statnting int he past 12/22/2020   • Diabetes (CMS/[facility])    • Diabetes mellitus (CMS/[facility])    • Essential hypertension 12/22/2020   • Heart & renal disease, hypertensive malignant with heart failure (CMS/[facility])    • Heart attack (CMS/[facility])    • History of bronchitis    • Hyperlipidemia 12/22/2020   • Low back pain    • Obesity (BMI 30-39.9) 12/22/2020   • Pneumonia    • Pulmonary embolism (CMS/[facility])        Allergies   Allergen Reactions   • Codeine Other (See Comments)     Lethargic       Past Surgical History:   Procedure Laterality Date   • CHOLECYSTECTOMY     • CORONARY STENT PLACEMENT  02/08/2013    3.0x23mm xieuce mid RCA done at ; LAD 2.36kwh67eq Ref #[id number] done at [facility] 12/29/12   • HYSTERECTOMY         Family History   Problem Relation Age of Onset   • Cancer Mother    • No Known Problems Father        Social History     Socioeconomic History   • Marital status:      Spouse name: Not on file   • Number of children: Not on file   • Years of education: Not on file   • Highest education level: Not on file   Occupational History   • Occupation: Radiology Tech   Tobacco Use   • Smoking status: Current Every Day Smoker     Packs/day: 0.50     Years: 30.00     Pack years: 15.00     Types: Cigarettes   • Smokeless tobacco: Never Used   Substance and Sexual Activity   • Alcohol use: No     Frequency: Never   • Drug use: No   • Sexual activity: Defer            Objective   Physical Exam  Vitals signs and nursing note reviewed.   Constitutional:       General: She is not in acute distress.     Appearance: She is well-developed. She is not diaphoretic.   HENT:      Head: Normocephalic and atraumatic.      Right Ear: Tympanic membrane and external ear normal.      Left Ear: Tympanic membrane and external ear normal.      Nose: Nose normal.      Mouth/Throat:      Mouth: Mucous membranes are moist.      Pharynx: Oropharynx is clear.   Eyes:      Extraocular Movements: Extraocular movements intact.      Conjunctiva/sclera: Conjunctivae normal.      Pupils: Pupils are equal, round, and reactive to light.   Neck:      Musculoskeletal: Normal range of motion and neck supple.      Vascular: No JVD.      Trachea: No tracheal deviation.   Cardiovascular:      Rate and Rhythm: Normal rate and regular rhythm.      Heart sounds: Normal heart sounds. No murmur.   Pulmonary:      Effort: Pulmonary effort is normal. No respiratory distress.      Breath sounds: Normal breath sounds. No wheezing.   Abdominal:      General: Bowel sounds are normal. There is no distension.      Palpations: Abdomen is soft.      Tenderness: There is no abdominal tenderness. There is no guarding or rebound.   Musculoskeletal: Normal range of motion.         General: No deformity.      Right lower leg: She exhibits tenderness. She exhibits no deformity and no laceration. Edema present.      Right foot: Normal range of motion and normal capillary refill. Tenderness and swelling present. No crepitus, deformity or laceration.   Skin:     General: Skin is warm and dry.      Coloration: Skin is not pale.      Findings: No erythema or rash.   Neurological:      Mental Status: She is alert and oriented to person, place, and time.      Cranial Nerves: No cranial nerve deficit.   Psychiatric:         Behavior: Behavior normal.         Thought Content: Thought content normal.         Splint - Cast -  Strapping    Date/Time: 2/9/2021 8:49 PM  Performed by: Nahed Talbot PA-C  Authorized by: Tim Romero MD     Consent:     Consent obtained:  Verbal    Consent given by:  Patient    Risks discussed:  Discoloration, numbness, pain and swelling    Alternatives discussed:  No treatment  Pre-procedure details:     Sensation:  Normal    Skin color:  Pink  Procedure details:     Laterality:  Right    Location:  Foot    Foot:  R foot    Strapping: yes      Splint type:  CAM walker boot    Supplies:  Prefabricated splint  Post-procedure details:     Pain:  Improved    Sensation:  Normal    Skin color:  Pink    Patient tolerance of procedure:  Tolerated well, no immediate complications               ED Course  ED Course as of Feb 09 2049   Tue Feb 09, 2021 1935 IMPRESSION:  No acute bony abnormality      This report was finalized on 2/9/2021 7:03 PM by Dr. Tyrel Rios MD.   XR Ankle 3+ View Right []   1935 IMPRESSION:  No acute bony abnormality      This report was finalized on 2/9/2021 7:02 PM by Dr. Tyrel Rios MD.   XR Foot 3+ View Right [MH]   2047 Discussed plan of care with patient.  Advised if symptoms worsen return to ED peer advised to follow-up with PCP in 1 to 2 days.    [MH]      ED Course User Index  [MH] Nahed Talbot PA-C                                           [facility]    Final diagnoses:   Right foot pain            Nahed Talbot PA-C  02/09/21 2049

## 2021-03-19 NOTE — PROGRESS NOTES
Patient educated on 24 hr urine collection. Lab scheduled for Monday.    Pharmacy to dose Lovenox for active DVT/PE: Wt = 111kg  CrCl = 104.2.  Dosed as follows:  Lovenox 1mg/kg (110mg) sq q12h.  Pharmacy will monitor and adjust dosing if needed.     Erick Knott RPH  03:48 EST  12/22/20

## 2021-09-17 NOTE — PROGRESS NOTES
Patient: Leisa Vasquez    YOB: 1955    Date: 09/20/2021    Primary Care Provider: China Cox PA    Chief Complaint   Patient presents with   • Establish Care     Positive cologaurd, frequent diarrhea       SUBJECTIVE:    History of present illness:  I saw the patient in the office today as a consultation for evaluation for a colonoscopy.  Never had a colonoscopy in the past.  She has a positive Cologuard.  Patient also states that she has some intermittent diarrhea maybe once or twice a week.  GERD for about 2 years.    The following portions of the patient's history were reviewed and updated as appropriate: allergies, current medications, past family history, past medical history, past social history, past surgical history and problem list.    Review of Systems   Constitutional: Negative for chills, fever and unexpected weight change.   HENT: Negative for hearing loss, trouble swallowing and voice change.    Eyes: Negative for visual disturbance.   Respiratory: Negative for apnea, cough, chest tightness, shortness of breath and wheezing.    Cardiovascular: Negative for chest pain, palpitations and leg swelling.   Gastrointestinal: Positive for diarrhea. Negative for abdominal distention, abdominal pain, anal bleeding, blood in stool, constipation, nausea, rectal pain and vomiting.   Endocrine: Negative for cold intolerance and heat intolerance.   Genitourinary: Negative for difficulty urinating, dysuria and flank pain.   Musculoskeletal: Negative for back pain and gait problem.   Skin: Negative for color change, rash and wound.   Neurological: Negative for dizziness, syncope, speech difficulty, weakness, light-headedness, numbness and headaches.   Hematological: Negative for adenopathy. Does not bruise/bleed easily.   Psychiatric/Behavioral: Negative for confusion. The patient is not nervous/anxious.        History:  Past Medical History:   Diagnosis Date   • Arthritis    • COPD (chronic  obstructive pulmonary disease) (CMS/[facility])    • Coronary artery disease s/p statnting int he past 12/22/2020   • Diabetes (CMS/[facility])    • Diabetes mellitus (CMS/[facility])    • Essential hypertension 12/22/2020   • Heart & renal disease, hypertensive malignant with heart failure (CMS/[facility])    • Heart attack (CMS/[facility])    • History of bronchitis    • Hyperlipidemia 12/22/2020   • Low back pain    • Obesity (BMI 30-39.9) 12/22/2020   • Pneumonia    • Pulmonary embolism (CMS/[facility])        Past Surgical History:   Procedure Laterality Date   • CHOLECYSTECTOMY     • CORONARY STENT PLACEMENT  02/08/2013    3.0x23mm xieuce mid RCA done at ; LAD 2.34odj02co Ref #[id number] done at [facility] 12/29/12   • HYSTERECTOMY         Family History   Problem Relation Age of Onset   • Cancer Mother    • No Known Problems Father        Social History     Tobacco Use   • Smoking status: Current Every Day Smoker     Packs/day: 0.50     Years: 30.00     Pack years: 15.00     Types: Cigarettes   • Smokeless tobacco: Never Used   Vaping Use   • Vaping Use: Never used   Substance Use Topics   • Alcohol use: No   • Drug use: No       Medications:   Current Outpatient Medications:   •  amLODIPine (NORVASC) 10 MG tablet, Take 10 mg by mouth Daily., Disp: , Rfl:   •  apixaban (ELIQUIS) 5 MG tablet tablet, Take 1 tablet by mouth Every 12 (Twelve) Hours. Indications: DVT/PE (active thrombosis), Disp: 60 tablet, Rfl: 0  •  carvedilol (COREG) 25 MG tablet, Take 25 mg by mouth 2 (Two) Times a Day With Meals., Disp: , Rfl:   •  metFORMIN (GLUCOPHAGE) 500 MG tablet, Take 0.5 tablets by mouth 2 (Two) Times a Day With Meals., Disp: , Rfl:   •  aspirin 81 MG chewable tablet, Chew 81 mg Daily., Disp: , Rfl:   •  atorvastatin (LIPITOR) 80 MG tablet, Take 80 mg by mouth Daily., Disp: , Rfl:   •  bumetanide (BUMEX) 2 MG tablet, Take 1 tablet by mouth Daily for 30 days., Disp: 30 tablet, Rfl: 0  •  citalopram (CeleXA) 20 MG tablet, Take 20 mg by mouth Daily., Disp:  ", Rfl:   •  dexamethasone (DECADRON) 4 MG tablet, Take 1 tablet by mouth 2 (Two) Times a Day With Meals., Disp: 10 tablet, Rfl: 0  •  doxycycline (MONODOX) 100 MG capsule, Take 1 capsule by mouth Every 12 (Twelve) Hours., Disp: 20 capsule, Rfl: 0  •  ipratropium-albuterol (DUO-NEB) 0.5-2.5 mg/3 ml nebulizer, Take 3 mL by nebulization Every 6 (Six) Hours As Needed for Wheezing., Disp: , Rfl:   •  nitroglycerin (NITROSTAT) 0.4 MG SL tablet, Place 0.4 mg under the tongue Every 5 (Five) Minutes As Needed for Chest Pain. Take no more than 3 doses in 15 minutes., Disp: , Rfl:   •  omeprazole (priLOSEC) 20 MG capsule, Take 20 mg by mouth Daily., Disp: , Rfl:   •  oxyCODONE-acetaminophen (PERCOCET) 5-325 MG per tablet, Take 1 tablet by mouth Every 6 (Six) Hours As Needed for Severe Pain ., Disp: 10 tablet, Rfl: 0       Allergies:   Allergies   Allergen Reactions   • Codeine Other (See Comments)     Lethargic       OBJECTIVE:    Vital Signs:  Vitals:    09/20/21 1438   BP: 137/72   Pulse: 93   Resp: 18   Temp: 99.9 °F (37.7 °C)   TempSrc: Temporal   SpO2: 91%   Weight: 115 kg (253 lb)   Height: 170.2 cm (67\")       Physical Exam:  General Appearance:    Alert, cooperative, in no acute distress   Head:    Normocephalic, without obvious abnormality, atraumatic   Eyes:            Normal.  No scleral icterus.  PERRLA    Lungs:     Clear to auscultation,respirations regular, even and                  unlabored    Heart:    Regular rhythm and normal rate, normal S1 and S2, no            murmur   Abdomen:     Normal bowel sounds, no masses, no organomegaly, soft        non-tender, non-distended, no guarding,    Extremities:   Moves all extremities well, no edema, no cyanosis, no             redness   Skin:   No bleeding, bruising or rash   Neurologic:   Normal without gross deficits.   Psychiatric: No evidence of depression or anxiety         Results Review:   None    Review of Systems was reviewed and confirmed as accurate as " documented by the MA.    ASSESSMENT/PLAN:    1. Positive colorectal cancer screening using Cologuard test    2. Chronic diarrhea    Currently diarrhea only once or twice a week    Due to the positive Cologuard I recommend a colonoscopy.  I explained the procedure to the patient as well as the risks of bleeding and perforation and they understand the ramifications of these potential complications including operative intervention and they wish to proceed.     Electronically signed by Sb Johnson MD  09/20/21  14:57 EDT

## 2021-09-20 ENCOUNTER — OFFICE VISIT (OUTPATIENT)
Dept: SURGERY | Facility: CLINIC | Age: 66
End: 2021-09-20

## 2021-09-20 VITALS
DIASTOLIC BLOOD PRESSURE: 72 MMHG | TEMPERATURE: 99.9 F | HEIGHT: 67 IN | OXYGEN SATURATION: 91 % | BODY MASS INDEX: 39.71 KG/M2 | WEIGHT: 253 LBS | HEART RATE: 93 BPM | SYSTOLIC BLOOD PRESSURE: 137 MMHG | RESPIRATION RATE: 18 BRPM

## 2021-09-20 DIAGNOSIS — Z01.818 PREOP TESTING: ICD-10-CM

## 2021-09-20 DIAGNOSIS — R19.5 POSITIVE COLORECTAL CANCER SCREENING USING COLOGUARD TEST: Primary | ICD-10-CM

## 2021-09-20 DIAGNOSIS — K52.9 CHRONIC DIARRHEA: ICD-10-CM

## 2021-09-20 PROCEDURE — 99203 OFFICE O/P NEW LOW 30 MIN: CPT | Performed by: SURGERY

## 2021-09-20 RX ORDER — BISACODYL 5 MG
TABLET, DELAYED RELEASE (ENTERIC COATED) ORAL
Qty: 4 TABLET | Refills: 0 | Status: SHIPPED | OUTPATIENT
Start: 2021-09-20

## 2021-09-20 RX ORDER — POLYETHYLENE GLYCOL 3350 17 G/17G
POWDER, FOR SOLUTION ORAL
Qty: 238 G | Refills: 0 | Status: SHIPPED | OUTPATIENT
Start: 2021-09-20

## 2021-10-04 ENCOUNTER — OUTSIDE FACILITY SERVICE (OUTPATIENT)
Dept: SURGERY | Facility: CLINIC | Age: 66
End: 2021-10-04

## 2021-10-04 PROCEDURE — 45385 COLONOSCOPY W/LESION REMOVAL: CPT | Performed by: SURGERY

## 2021-12-30 ENCOUNTER — TRANSCRIBE ORDERS (OUTPATIENT)
Dept: ADMINISTRATIVE | Facility: HOSPITAL | Age: 66
End: 2021-12-30

## 2021-12-30 DIAGNOSIS — J44.1 OBSTRUCTIVE CHRONIC BRONCHITIS WITH EXACERBATION (HCC): ICD-10-CM

## 2021-12-30 DIAGNOSIS — R07.9 CHEST PAIN, UNSPECIFIED TYPE: Primary | ICD-10-CM

## 2022-01-18 ENCOUNTER — APPOINTMENT (OUTPATIENT)
Dept: NUCLEAR MEDICINE | Facility: HOSPITAL | Age: 67
End: 2022-01-18

## 2022-01-18 ENCOUNTER — APPOINTMENT (OUTPATIENT)
Dept: RESPIRATORY THERAPY | Facility: HOSPITAL | Age: 67
End: 2022-01-18

## 2022-01-18 ENCOUNTER — APPOINTMENT (OUTPATIENT)
Dept: CARDIOLOGY | Facility: HOSPITAL | Age: 67
End: 2022-01-18

## 2022-01-28 ENCOUNTER — TRANSCRIBE ORDERS (OUTPATIENT)
Dept: LAB | Facility: HOSPITAL | Age: 67
End: 2022-01-28

## 2022-01-28 ENCOUNTER — HOSPITAL ENCOUNTER (OUTPATIENT)
Dept: RESPIRATORY THERAPY | Facility: HOSPITAL | Age: 67
Discharge: HOME | End: 2022-01-28

## 2022-01-28 ENCOUNTER — HOSPITAL ENCOUNTER (OUTPATIENT)
Dept: CARDIOLOGY | Facility: HOSPITAL | Age: 67
Discharge: HOME | End: 2022-01-28

## 2022-01-28 ENCOUNTER — LAB (OUTPATIENT)
Dept: LAB | Facility: HOSPITAL | Age: 67
End: 2022-01-28

## 2022-01-28 ENCOUNTER — TRANSCRIBE ORDERS (OUTPATIENT)
Dept: ADMINISTRATIVE | Facility: HOSPITAL | Age: 67
End: 2022-01-28

## 2022-01-28 DIAGNOSIS — E11.9 DIABETES MELLITUS WITHOUT COMPLICATION: ICD-10-CM

## 2022-01-28 DIAGNOSIS — E55.9 VITAMIN D DEFICIENCY: ICD-10-CM

## 2022-01-28 DIAGNOSIS — E55.9 VITAMIN D DEFICIENCY: Primary | ICD-10-CM

## 2022-01-28 DIAGNOSIS — Z79.899 ENCOUNTER FOR LONG-TERM (CURRENT) USE OF OTHER MEDICATIONS: ICD-10-CM

## 2022-01-28 DIAGNOSIS — R07.9 CHEST PAIN, UNSPECIFIED TYPE: ICD-10-CM

## 2022-01-28 DIAGNOSIS — E78.2 MIXED HYPERLIPIDEMIA: ICD-10-CM

## 2022-01-28 DIAGNOSIS — I10 ESSENTIAL HYPERTENSION, MALIGNANT: ICD-10-CM

## 2022-01-28 DIAGNOSIS — Z01.818 PRE-OPERATIVE CLEARANCE: Primary | ICD-10-CM

## 2022-01-28 DIAGNOSIS — J44.1 OBSTRUCTIVE CHRONIC BRONCHITIS WITH EXACERBATION: ICD-10-CM

## 2022-01-28 LAB
ALBUMIN SERPL-MCNC: 4.12 G/DL (ref 3.5–5.2)
ALBUMIN/GLOB SERPL: 1.2 G/DL
ALP SERPL-CCNC: 90 U/L (ref 39–117)
ALT SERPL W P-5'-P-CCNC: 21 U/L (ref 1–33)
ANION GAP SERPL CALCULATED.3IONS-SCNC: 12.5 MMOL/L (ref 5–15)
AST SERPL-CCNC: 21 U/L (ref 1–32)
BASOPHILS # BLD AUTO: 0.09 10*3/MM3 (ref 0–0.2)
BASOPHILS NFR BLD AUTO: 0.9 % (ref 0–1.5)
BH CV ECHO MEAS - ACS: 1.1 CM
BH CV ECHO MEAS - AO MAX PG: 6 MMHG
BH CV ECHO MEAS - AO MEAN PG: 3 MMHG
BH CV ECHO MEAS - AO ROOT AREA (BSA CORRECTED): 1.2
BH CV ECHO MEAS - AO ROOT AREA: 5.3 CM^2
BH CV ECHO MEAS - AO ROOT DIAM: 2.6 CM
BH CV ECHO MEAS - AO V2 MAX: 122 CM/SEC
BH CV ECHO MEAS - AO V2 MEAN: 87.5 CM/SEC
BH CV ECHO MEAS - AO V2 VTI: 29.6 CM
BH CV ECHO MEAS - BSA(HAYCOCK): 2.4 M^2
BH CV ECHO MEAS - BSA: 2.2 M^2
BH CV ECHO MEAS - BZI_BMI: 39.6 KILOGRAMS/M^2
BH CV ECHO MEAS - BZI_METRIC_HEIGHT: 170.2 CM
BH CV ECHO MEAS - BZI_METRIC_WEIGHT: 114.8 KG
BH CV ECHO MEAS - EDV(CUBED): 117.6 ML
BH CV ECHO MEAS - EDV(MOD-SP4): 32.1 ML
BH CV ECHO MEAS - EDV(TEICH): 112.8 ML
BH CV ECHO MEAS - EF(CUBED): 53.4 %
BH CV ECHO MEAS - EF(MOD-SP4): 75.8 %
BH CV ECHO MEAS - EF(TEICH): 45.1 %
BH CV ECHO MEAS - ESV(CUBED): 54.9 ML
BH CV ECHO MEAS - ESV(MOD-SP4): 7.8 ML
BH CV ECHO MEAS - ESV(TEICH): 62 ML
BH CV ECHO MEAS - FS: 22.4 %
BH CV ECHO MEAS - IVS/LVPW: 1.1
BH CV ECHO MEAS - IVSD: 1.1 CM
BH CV ECHO MEAS - LA DIMENSION: 2.8 CM
BH CV ECHO MEAS - LA/AO: 1.1
BH CV ECHO MEAS - LV DIASTOLIC VOL/BSA (35-75): 14.4 ML/M^2
BH CV ECHO MEAS - LV MASS(C)D: 188.1 GRAMS
BH CV ECHO MEAS - LV MASS(C)DI: 84.2 GRAMS/M^2
BH CV ECHO MEAS - LV SYSTOLIC VOL/BSA (12-30): 3.5 ML/M^2
BH CV ECHO MEAS - LVIDD: 4.9 CM
BH CV ECHO MEAS - LVIDS: 3.8 CM
BH CV ECHO MEAS - LVLD AP4: 6 CM
BH CV ECHO MEAS - LVLS AP4: 5.3 CM
BH CV ECHO MEAS - LVOT AREA (M): 3.1 CM^2
BH CV ECHO MEAS - LVOT AREA: 3.1 CM^2
BH CV ECHO MEAS - LVOT DIAM: 2 CM
BH CV ECHO MEAS - LVPWD: 1 CM
BH CV ECHO MEAS - MV A MAX VEL: 78 CM/SEC
BH CV ECHO MEAS - MV E MAX VEL: 50.6 CM/SEC
BH CV ECHO MEAS - MV E/A: 0.65
BH CV ECHO MEAS - SI(AO): 70.3 ML/M^2
BH CV ECHO MEAS - SI(CUBED): 28.1 ML/M^2
BH CV ECHO MEAS - SI(MOD-SP4): 10.9 ML/M^2
BH CV ECHO MEAS - SI(TEICH): 22.8 ML/M^2
BH CV ECHO MEAS - SV(AO): 157.2 ML
BH CV ECHO MEAS - SV(CUBED): 62.8 ML
BH CV ECHO MEAS - SV(MOD-SP4): 24.3 ML
BH CV ECHO MEAS - SV(TEICH): 50.9 ML
BILIRUB SERPL-MCNC: 0.7 MG/DL (ref 0–1.2)
BUN SERPL-MCNC: 14 MG/DL (ref 8–23)
BUN/CREAT SERPL: 15.7 (ref 7–25)
CALCIUM SPEC-SCNC: 9.9 MG/DL (ref 8.6–10.5)
CHLORIDE SERPL-SCNC: 101 MMOL/L (ref 98–107)
CHOLEST SERPL-MCNC: 202 MG/DL (ref 0–200)
CO2 SERPL-SCNC: 27.5 MMOL/L (ref 22–29)
CREAT SERPL-MCNC: 0.89 MG/DL (ref 0.57–1)
DEPRECATED RDW RBC AUTO: 42.8 FL (ref 37–54)
EOSINOPHIL # BLD AUTO: 0.2 10*3/MM3 (ref 0–0.4)
EOSINOPHIL NFR BLD AUTO: 2 % (ref 0.3–6.2)
ERYTHROCYTE [DISTWIDTH] IN BLOOD BY AUTOMATED COUNT: 12.2 % (ref 12.3–15.4)
GFR SERPL CREATININE-BSD FRML MDRD: 64 ML/MIN/1.73
GLOBULIN UR ELPH-MCNC: 3.6 GM/DL
GLUCOSE SERPL-MCNC: 213 MG/DL (ref 65–99)
HBA1C MFR BLD: 8 % (ref 4.8–5.6)
HCT VFR BLD AUTO: 48.3 % (ref 34–46.6)
HDLC SERPL-MCNC: 43 MG/DL (ref 40–60)
HGB BLD-MCNC: 15.4 G/DL (ref 12–15.9)
IMM GRANULOCYTES # BLD AUTO: 0.05 10*3/MM3 (ref 0–0.05)
IMM GRANULOCYTES NFR BLD AUTO: 0.5 % (ref 0–0.5)
LDLC SERPL CALC-MCNC: 139 MG/DL (ref 0–100)
LDLC/HDLC SERPL: 3.18 {RATIO}
LYMPHOCYTES # BLD AUTO: 1.99 10*3/MM3 (ref 0.7–3.1)
LYMPHOCYTES NFR BLD AUTO: 20.1 % (ref 19.6–45.3)
MAXIMAL PREDICTED HEART RATE: 156 BPM
MCH RBC QN AUTO: 30.6 PG (ref 26.6–33)
MCHC RBC AUTO-ENTMCNC: 31.9 G/DL (ref 31.5–35.7)
MCV RBC AUTO: 95.8 FL (ref 79–97)
MONOCYTES # BLD AUTO: 0.67 10*3/MM3 (ref 0.1–0.9)
MONOCYTES NFR BLD AUTO: 6.8 % (ref 5–12)
NEUTROPHILS NFR BLD AUTO: 6.89 10*3/MM3 (ref 1.7–7)
NEUTROPHILS NFR BLD AUTO: 69.7 % (ref 42.7–76)
NRBC BLD AUTO-RTO: 0 /100 WBC (ref 0–0.2)
PLATELET # BLD AUTO: 245 10*3/MM3 (ref 140–450)
PMV BLD AUTO: 10.2 FL (ref 6–12)
POTASSIUM SERPL-SCNC: 4.1 MMOL/L (ref 3.5–5.2)
PROT SERPL-MCNC: 7.7 G/DL (ref 6–8.5)
RBC # BLD AUTO: 5.04 10*6/MM3 (ref 3.77–5.28)
SARS-COV-2 RNA RESP QL NAA+PROBE: NOT DETECTED
SODIUM SERPL-SCNC: 141 MMOL/L (ref 136–145)
STRESS TARGET HR: 133 BPM
T4 FREE SERPL-MCNC: 1.12 NG/DL (ref 0.93–1.7)
TRIGL SERPL-MCNC: 112 MG/DL (ref 0–150)
TSH SERPL DL<=0.05 MIU/L-ACNC: 1.89 UIU/ML (ref 0.27–4.2)
VLDLC SERPL-MCNC: 20 MG/DL (ref 5–40)
WBC NRBC COR # BLD: 9.89 10*3/MM3 (ref 3.4–10.8)

## 2022-01-28 PROCEDURE — 84439 ASSAY OF FREE THYROXINE: CPT

## 2022-01-28 PROCEDURE — 94060 EVALUATION OF WHEEZING: CPT

## 2022-01-28 PROCEDURE — 36415 COLL VENOUS BLD VENIPUNCTURE: CPT

## 2022-01-28 PROCEDURE — 87635 SARS-COV-2 COVID-19 AMP PRB: CPT | Performed by: FAMILY MEDICINE

## 2022-01-28 PROCEDURE — 82306 VITAMIN D 25 HYDROXY: CPT

## 2022-01-28 PROCEDURE — 94060 EVALUATION OF WHEEZING: CPT | Performed by: INTERNAL MEDICINE

## 2022-01-28 PROCEDURE — 84443 ASSAY THYROID STIM HORMONE: CPT

## 2022-01-28 PROCEDURE — 93306 TTE W/DOPPLER COMPLETE: CPT

## 2022-01-28 PROCEDURE — C9803 HOPD COVID-19 SPEC COLLECT: HCPCS

## 2022-01-28 PROCEDURE — 83036 HEMOGLOBIN GLYCOSYLATED A1C: CPT

## 2022-01-28 PROCEDURE — 93306 TTE W/DOPPLER COMPLETE: CPT | Performed by: INTERNAL MEDICINE

## 2022-01-28 PROCEDURE — 80061 LIPID PANEL: CPT

## 2022-01-28 PROCEDURE — 85025 COMPLETE CBC W/AUTO DIFF WBC: CPT

## 2022-01-28 PROCEDURE — 94640 AIRWAY INHALATION TREATMENT: CPT

## 2022-01-28 PROCEDURE — 80053 COMPREHEN METABOLIC PANEL: CPT

## 2022-01-28 RX ORDER — ALBUTEROL SULFATE 2.5 MG/3ML
2.5 SOLUTION RESPIRATORY (INHALATION) ONCE
Status: COMPLETED | OUTPATIENT
Start: 2022-01-28 | End: 2022-01-28

## 2022-01-28 RX ADMIN — ALBUTEROL SULFATE 2.5 MG: 2.5 SOLUTION RESPIRATORY (INHALATION) at 14:36

## 2022-01-29 LAB — 25(OH)D3 SERPL-MCNC: 42.9 NG/ML (ref 30–100)

## 2022-03-18 ENCOUNTER — TRANSCRIBE ORDERS (OUTPATIENT)
Dept: ADMINISTRATIVE | Facility: HOSPITAL | Age: 67
End: 2022-03-18

## 2022-03-18 DIAGNOSIS — R07.9 CHEST PAIN, UNSPECIFIED TYPE: Primary | ICD-10-CM

## 2022-04-04 ENCOUNTER — APPOINTMENT (OUTPATIENT)
Dept: CARDIOLOGY | Facility: HOSPITAL | Age: 67
End: 2022-04-04

## 2022-04-04 ENCOUNTER — APPOINTMENT (OUTPATIENT)
Dept: NUCLEAR MEDICINE | Facility: HOSPITAL | Age: 67
End: 2022-04-04

## 2022-09-13 ENCOUNTER — TRANSCRIBE ORDERS (OUTPATIENT)
Dept: ADMINISTRATIVE | Facility: HOSPITAL | Age: 67
End: 2022-09-13

## 2022-09-13 DIAGNOSIS — R07.89 OTHER CHEST PAIN: Primary | ICD-10-CM

## 2022-11-19 ENCOUNTER — HOSPITAL ENCOUNTER (EMERGENCY)
Facility: HOSPITAL | Age: 67
Discharge: LEFT BEFORE DISCHARGE | End: 2022-11-19

## 2022-11-19 VITALS
HEIGHT: 67 IN | HEART RATE: 90 BPM | TEMPERATURE: 98 F | RESPIRATION RATE: 18 BRPM | OXYGEN SATURATION: 94 % | SYSTOLIC BLOOD PRESSURE: 139 MMHG | WEIGHT: 239.86 LBS | BODY MASS INDEX: 37.65 KG/M2 | DIASTOLIC BLOOD PRESSURE: 74 MMHG

## 2022-11-19 PROCEDURE — 99211 OFF/OP EST MAY X REQ PHY/QHP: CPT

## 2022-11-19 NOTE — ED NOTES
Patient was told that rooms were coming available and patient left. Stated she felt much better and her blood pressure was running better.

## 2023-08-26 ENCOUNTER — APPOINTMENT (OUTPATIENT)
Dept: GENERAL RADIOLOGY | Facility: HOSPITAL | Age: 68
End: 2023-08-26
Payer: MEDICARE

## 2023-08-26 ENCOUNTER — HOSPITAL ENCOUNTER (EMERGENCY)
Facility: HOSPITAL | Age: 68
Discharge: HOME | End: 2023-08-26
Attending: STUDENT IN AN ORGANIZED HEALTH CARE EDUCATION/TRAINING PROGRAM
Payer: MEDICARE

## 2023-08-26 VITALS
HEIGHT: 67 IN | BODY MASS INDEX: 37.67 KG/M2 | OXYGEN SATURATION: 98 % | HEART RATE: 76 BPM | DIASTOLIC BLOOD PRESSURE: 42 MMHG | RESPIRATION RATE: 19 BRPM | WEIGHT: 240 LBS | TEMPERATURE: 98.3 F | SYSTOLIC BLOOD PRESSURE: 110 MMHG

## 2023-08-26 DIAGNOSIS — S96.912A STRAIN OF LEFT ANKLE, INITIAL ENCOUNTER: Primary | ICD-10-CM

## 2023-08-26 PROCEDURE — 73610 X-RAY EXAM OF ANKLE: CPT | Performed by: RADIOLOGY

## 2023-08-26 PROCEDURE — 73610 X-RAY EXAM OF ANKLE: CPT

## 2023-08-26 PROCEDURE — 99283 EMERGENCY DEPT VISIT LOW MDM: CPT

## 2023-08-26 PROCEDURE — 73630 X-RAY EXAM OF FOOT: CPT

## 2023-08-26 PROCEDURE — 73630 X-RAY EXAM OF FOOT: CPT | Performed by: RADIOLOGY

## 2023-08-26 RX ORDER — HYDROCODONE BITARTRATE AND ACETAMINOPHEN 5; 325 MG/1; MG/1
1 TABLET ORAL ONCE
Status: COMPLETED | OUTPATIENT
Start: 2023-08-26 | End: 2023-08-26

## 2023-08-26 RX ADMIN — HYDROCODONE BITARTRATE AND ACETAMINOPHEN 1 TABLET: 5; 325 TABLET ORAL at 17:44

## 2023-08-26 NOTE — ED PROVIDER NOTES
Subjective   History of Present Illness  This is a 67 year old of left ankle injury. PMH significant for HTN, HLD, DM not requiring insulin, GERD. The patient had a mechanical fall 3 days where she lost her balance and fell and injured her left ankle. Today, she was unable to stand on it so she called EMS. She now has pain in the left ankle as well as swelling. Denies numbness/tingling.     Review of Systems   Constitutional: Negative.  Negative for fever.   HENT: Negative.     Respiratory: Negative.     Cardiovascular: Negative.  Negative for chest pain.   Gastrointestinal: Negative.  Negative for abdominal pain.   Endocrine: Negative.    Genitourinary: Negative.  Negative for dysuria.   Musculoskeletal:  Negative for arthralgias, back pain, gait problem, joint swelling, myalgias, neck pain and neck stiffness.        Left ankle injury    Skin: Negative.    Neurological: Negative.    Psychiatric/Behavioral: Negative.     All other systems reviewed and are negative.    Past Medical History:   Diagnosis Date    Arthritis     COPD (chronic obstructive pulmonary disease)     Coronary artery disease s/p statnting int he past 12/22/2020    Diabetes     Diabetes mellitus     Essential hypertension 12/22/2020    Heart & renal disease, hypertensive malignant with heart failure     Heart attack     History of bronchitis     Hyperlipidemia 12/22/2020    Low back pain     Obesity (BMI 30-39.9) 12/22/2020    Pneumonia     Pulmonary embolism        Allergies   Allergen Reactions    Codeine Other (See Comments) and Unknown (See Comments)     Lethargic       Past Surgical History:   Procedure Laterality Date    CHOLECYSTECTOMY      CORONARY STENT PLACEMENT  02/08/2013    3.0x23mm xieuce mid RCA done at ; LAD 2.21xny40mg Ref #[id number] done at [facility] 12/29/12    HYSTERECTOMY         Family History   Problem Relation Age of Onset    Cancer Mother     No Known Problems Father        Social History     Socioeconomic History     Marital status:    Tobacco Use    Smoking status: Every Day     Packs/day: 0.50     Years: 30.00     Pack years: 15.00     Types: Cigarettes    Smokeless tobacco: Never   Vaping Use    Vaping Use: Never used   Substance and Sexual Activity    Alcohol use: No    Drug use: No    Sexual activity: Defer           Objective   Physical Exam  Vitals and nursing note reviewed.   Constitutional:       General: She is not in acute distress.     Appearance: She is well-developed. She is not diaphoretic.   HENT:      Head: Normocephalic and atraumatic.      Right Ear: External ear normal.      Left Ear: External ear normal.      Nose: Nose normal.   Eyes:      Conjunctiva/sclera: Conjunctivae normal.      Pupils: Pupils are equal, round, and reactive to light.   Neck:      Vascular: No JVD.      Trachea: No tracheal deviation.   Cardiovascular:      Rate and Rhythm: Normal rate and regular rhythm.      Heart sounds: Normal heart sounds. No murmur heard.  Pulmonary:      Effort: Pulmonary effort is normal. No respiratory distress.      Breath sounds: Normal breath sounds. No wheezing.   Abdominal:      General: Bowel sounds are normal.      Palpations: Abdomen is soft.      Tenderness: There is no abdominal tenderness.   Musculoskeletal:         General: Swelling, tenderness and signs of injury present. No deformity.      Cervical back: Normal range of motion and neck supple.      Comments: Left ankle skin intact with bruising but no abrasion; edema and tenderness to palpation noted; ROM is limited and painful; neurovascular status and sensation LLE intact.    Skin:     General: Skin is warm and dry.      Coloration: Skin is not pale.      Findings: No erythema or rash.   Neurological:      Mental Status: She is alert and oriented to person, place, and time.      Cranial Nerves: No cranial nerve deficit.   Psychiatric:         Behavior: Behavior normal.         Thought Content: Thought content normal.       Splint -  Cast - Strapping    Date/Time: 8/26/2023 8:48 PM  Performed by: Jessica Eagle PA  Authorized by: Jack Francis DO     Consent:     Consent obtained:  Verbal    Consent given by:  Patient    Risks, benefits, and alternatives were discussed: yes      Risks discussed:  Discoloration, numbness, pain and swelling    Alternatives discussed:  Referral, observation, alternative treatment, delayed treatment and no treatment  Universal protocol:     Imaging studies available: yes      Patient identity confirmed:  Verbally with patient, arm band and hospital-assigned identification number  Pre-procedure details:     Distal neurologic exam:  Normal    Distal perfusion: distal pulses strong    Procedure details:     Location:  Ankle    Ankle location:  L ankle    Lower extremity splint type: Boot.    Supplies:  Prefabricated splint    Attestation: Splint applied and adjusted personally by me    Post-procedure details:     Distal neurologic exam:  Normal    Distal perfusion: distal pulses strong      Procedure completion:  Tolerated well, no immediate complications    Post-procedure imaging: not applicable             ED Course  ED Course as of 08/26/23 2051   Sat Aug 26, 2023   2019 XR Foot 3+ View Left  IMPRESSION:  Soft tissue swelling without acute osseous abnormality evident.     This report was finalized on 8/26/2023 8:16 PM by Alex Pallas, DO   [MM]   2020 XR Ankle 3+ View Left  IMPRESSION:  Soft tissue swelling without acute osseous abnormality evident.     This report was finalized on 8/26/2023 8:16 PM by Alex Pallas, DO   [MM]   2049 Patient diagnosed with left ankle strain. Will be d/c home in boot. Will f/u with PCP in 2 days or return to ER if symptoms worsen.  [MM]      ED Course User Index  [MM] Jessica Eagle PA                                           Medical Decision Making    This is a 67 year old of left ankle injury. PMH significant for HTN, HLD, DM not requiring insulin, GERD. The patient had a  mechanical fall 3 days where she lost her balance and fell and injured her left ankle. Today, she was unable to stand on it so she called EMS. She now has pain in the left ankle as well as swelling. Denies numbness/tingling.     Amount and/or Complexity of Data Reviewed  Radiology: ordered. Decision-making details documented in ED Course.    Risk  Prescription drug management.        Final diagnoses:   Strain of left ankle, initial encounter       ED Disposition  ED Disposition       ED Disposition   Discharge    Condition   Stable    Comment   --               Leisa Balderas MD  [address] [facility] [address] [phone]    In 2 days           Medication List      No changes were made to your prescriptions during this visit.            Jessica Eagle PA  08/26/23 2055

## 2023-08-26 NOTE — ED NOTES
Distal pulses noted to the left ankle at this time. Provider made aware    Bcc Histology Text: There were numerous aggregates of basaloid cells.

## 2023-08-27 NOTE — DISCHARGE INSTRUCTIONS
Please utilize your boot, rest, ice and elevation for pain relief. Please follow up with your PCP in 2 days or return to ER if symptoms worsen.

## 2024-12-02 ENCOUNTER — APPOINTMENT (OUTPATIENT)
Dept: GENERAL RADIOLOGY | Facility: HOSPITAL | Age: 69
End: 2024-12-02
Payer: MEDICARE

## 2024-12-02 ENCOUNTER — APPOINTMENT (OUTPATIENT)
Dept: CT IMAGING | Facility: HOSPITAL | Age: 69
End: 2024-12-02
Payer: MEDICARE

## 2024-12-02 ENCOUNTER — APPOINTMENT (OUTPATIENT)
Dept: MRI IMAGING | Facility: HOSPITAL | Age: 69
End: 2024-12-02
Payer: MEDICARE

## 2024-12-02 ENCOUNTER — HOSPITAL ENCOUNTER (INPATIENT)
Facility: HOSPITAL | Age: 69
LOS: 3 days | Discharge: HOME HEALTH | End: 2024-12-05
Attending: STUDENT IN AN ORGANIZED HEALTH CARE EDUCATION/TRAINING PROGRAM | Admitting: HOSPITALIST
Payer: MEDICARE

## 2024-12-02 DIAGNOSIS — R26.81 UNSTEADY GAIT WHEN WALKING: ICD-10-CM

## 2024-12-02 DIAGNOSIS — E83.51 HYPOCALCEMIA: ICD-10-CM

## 2024-12-02 DIAGNOSIS — G93.41 METABOLIC ENCEPHALOPATHY: Primary | ICD-10-CM

## 2024-12-02 DIAGNOSIS — E87.6 HYPOKALEMIA: ICD-10-CM

## 2024-12-02 DIAGNOSIS — R42 VERTIGO: ICD-10-CM

## 2024-12-02 DIAGNOSIS — I63.9 CEREBROVASCULAR ACCIDENT (CVA) DUE TO OCCLUSION: ICD-10-CM

## 2024-12-02 DIAGNOSIS — E83.42 HYPOMAGNESEMIA: ICD-10-CM

## 2024-12-02 LAB
A-A DO2: 50 MMHG (ref 0–300)
ALBUMIN SERPL-MCNC: 3.4 G/DL (ref 3.5–5.2)
ALBUMIN/GLOB SERPL: 0.9 G/DL
ALP SERPL-CCNC: 54 U/L (ref 39–117)
ALT SERPL W P-5'-P-CCNC: 17 U/L (ref 1–33)
ANION GAP SERPL CALCULATED.3IONS-SCNC: 14.5 MMOL/L (ref 5–15)
ANION GAP SERPL CALCULATED.3IONS-SCNC: 17.4 MMOL/L (ref 5–15)
APTT PPP: 26.2 SECONDS (ref 26.5–34.5)
ARTERIAL PATENCY WRIST A: ABNORMAL
AST SERPL-CCNC: 15 U/L (ref 1–32)
ATMOSPHERIC PRESS: 732 MMHG
BACTERIA UR QL AUTO: NORMAL /HPF
BASE EXCESS BLDA CALC-SCNC: 12.6 MMOL/L (ref 0–2)
BASOPHILS # BLD AUTO: 0.04 10*3/MM3 (ref 0–0.2)
BASOPHILS NFR BLD AUTO: 0.2 % (ref 0–1.5)
BDY SITE: ABNORMAL
BILIRUB SERPL-MCNC: 1.4 MG/DL (ref 0–1.2)
BILIRUB UR QL STRIP: NEGATIVE
BUN SERPL-MCNC: 12 MG/DL (ref 8–23)
BUN SERPL-MCNC: 13 MG/DL (ref 8–23)
BUN/CREAT SERPL: 21.4 (ref 7–25)
BUN/CREAT SERPL: 22 (ref 7–25)
CALCIUM SPEC-SCNC: 6.4 MG/DL (ref 8.6–10.5)
CALCIUM SPEC-SCNC: 6.6 MG/DL (ref 8.6–10.5)
CHLORIDE SERPL-SCNC: 91 MMOL/L (ref 98–107)
CHLORIDE SERPL-SCNC: 92 MMOL/L (ref 98–107)
CLARITY UR: CLEAR
CO2 BLDA-SCNC: 38.5 MMOL/L (ref 22–33)
CO2 SERPL-SCNC: 29.6 MMOL/L (ref 22–29)
CO2 SERPL-SCNC: 31.5 MMOL/L (ref 22–29)
COHGB MFR BLD: 1.5 % (ref 0–5)
COLOR UR: YELLOW
CREAT SERPL-MCNC: 0.56 MG/DL (ref 0.57–1)
CREAT SERPL-MCNC: 0.59 MG/DL (ref 0.57–1)
D-LACTATE SERPL-SCNC: 1.5 MMOL/L (ref 0.5–2)
D-LACTATE SERPL-SCNC: 2.6 MMOL/L (ref 0.5–2)
DEPRECATED RDW RBC AUTO: 43.7 FL (ref 37–54)
EGFRCR SERPLBLD CKD-EPI 2021: 100.8 ML/MIN/1.73
EGFRCR SERPLBLD CKD-EPI 2021: 99.5 ML/MIN/1.73
EOSINOPHIL # BLD AUTO: 0 10*3/MM3 (ref 0–0.4)
EOSINOPHIL NFR BLD AUTO: 0 % (ref 0.3–6.2)
ERYTHROCYTE [DISTWIDTH] IN BLOOD BY AUTOMATED COUNT: 13.1 % (ref 12.3–15.4)
GEN 5 1HR TROPONIN T REFLEX: 20 NG/L
GLOBULIN UR ELPH-MCNC: 3.6 GM/DL
GLUCOSE SERPL-MCNC: 258 MG/DL (ref 65–99)
GLUCOSE SERPL-MCNC: 277 MG/DL (ref 65–99)
GLUCOSE UR STRIP-MCNC: NEGATIVE MG/DL
HCO3 BLDA-SCNC: 37.1 MMOL/L (ref 20–26)
HCT VFR BLD AUTO: 45.2 % (ref 34–46.6)
HCT VFR BLD CALC: 42.7 % (ref 38–51)
HGB BLD-MCNC: 14.6 G/DL (ref 12–15.9)
HGB BLDA-MCNC: 13.9 G/DL (ref 13.5–17.5)
HGB UR QL STRIP.AUTO: ABNORMAL
HOLD SPECIMEN: NORMAL
HOLD SPECIMEN: NORMAL
HYALINE CASTS UR QL AUTO: NORMAL /LPF
IMM GRANULOCYTES # BLD AUTO: 0.08 10*3/MM3 (ref 0–0.05)
IMM GRANULOCYTES NFR BLD AUTO: 0.5 % (ref 0–0.5)
INHALED O2 CONCENTRATION: 21 %
INR PPP: 1.48 (ref 0.9–1.1)
KETONES UR QL STRIP: ABNORMAL
LEUKOCYTE ESTERASE UR QL STRIP.AUTO: NEGATIVE
LYMPHOCYTES # BLD AUTO: 2.53 10*3/MM3 (ref 0.7–3.1)
LYMPHOCYTES NFR BLD AUTO: 14.8 % (ref 19.6–45.3)
Lab: ABNORMAL
Lab: ABNORMAL
MAGNESIUM SERPL-MCNC: 0.3 MG/DL (ref 1.6–2.4)
MCH RBC QN AUTO: 29.8 PG (ref 26.6–33)
MCHC RBC AUTO-ENTMCNC: 32.3 G/DL (ref 31.5–35.7)
MCV RBC AUTO: 92.2 FL (ref 79–97)
METHGB BLD QL: <-0.1 % (ref 0–3)
MODALITY: ABNORMAL
MONOCYTES # BLD AUTO: 1.21 10*3/MM3 (ref 0.1–0.9)
MONOCYTES NFR BLD AUTO: 7.1 % (ref 5–12)
NEUTROPHILS NFR BLD AUTO: 13.2 10*3/MM3 (ref 1.7–7)
NEUTROPHILS NFR BLD AUTO: 77.4 % (ref 42.7–76)
NITRITE UR QL STRIP: NEGATIVE
NOTIFIED BY: ABNORMAL
NOTIFIED WHO: ABNORMAL
NRBC BLD AUTO-RTO: 0 /100 WBC (ref 0–0.2)
OXYHGB MFR BLDV: 76.3 % (ref 94–99)
PCO2 BLDA: 45.3 MM HG (ref 35–45)
PCO2 TEMP ADJ BLD: ABNORMAL MM[HG]
PH BLDA: 7.52 PH UNITS (ref 7.35–7.45)
PH UR STRIP.AUTO: 8 [PH] (ref 5–8)
PH, TEMP CORRECTED: ABNORMAL
PLATELET # BLD AUTO: 308 10*3/MM3 (ref 140–450)
PMV BLD AUTO: 10.2 FL (ref 6–12)
PO2 BLDA: 42.7 MM HG (ref 83–108)
PO2 TEMP ADJ BLD: ABNORMAL MM[HG]
POTASSIUM SERPL-SCNC: 2.6 MMOL/L (ref 3.5–5.2)
POTASSIUM SERPL-SCNC: 2.7 MMOL/L (ref 3.5–5.2)
PROCALCITONIN SERPL-MCNC: 0.03 NG/ML (ref 0–0.25)
PROT SERPL-MCNC: 7 G/DL (ref 6–8.5)
PROT UR QL STRIP: ABNORMAL
PROTHROMBIN TIME: 18 SECONDS (ref 12.1–14.7)
QT INTERVAL: 362 MS
QTC INTERVAL: 464 MS
RBC # BLD AUTO: 4.9 10*6/MM3 (ref 3.77–5.28)
RBC # UR STRIP: NORMAL /HPF
REF LAB TEST METHOD: NORMAL
SAO2 % BLDCOA: 77.1 % (ref 94–99)
SODIUM SERPL-SCNC: 138 MMOL/L (ref 136–145)
SODIUM SERPL-SCNC: 138 MMOL/L (ref 136–145)
SP GR UR STRIP: 1.01 (ref 1–1.03)
SQUAMOUS #/AREA URNS HPF: NORMAL /HPF
TROPONIN T NUMERIC DELTA: 1 NG/L
TROPONIN T SERPL HS-MCNC: 19 NG/L
UROBILINOGEN UR QL STRIP: ABNORMAL
VENTILATOR MODE: ABNORMAL
WBC # UR STRIP: NORMAL /HPF
WBC NRBC COR # BLD AUTO: 17.06 10*3/MM3 (ref 3.4–10.8)
WHOLE BLOOD HOLD COAG: NORMAL
WHOLE BLOOD HOLD SPECIMEN: NORMAL

## 2024-12-02 PROCEDURE — 25010000002 MAGNESIUM SULFATE 4 GM/100ML SOLUTION: Performed by: STUDENT IN AN ORGANIZED HEALTH CARE EDUCATION/TRAINING PROGRAM

## 2024-12-02 PROCEDURE — 74176 CT ABD & PELVIS W/O CONTRAST: CPT

## 2024-12-02 PROCEDURE — 93010 ELECTROCARDIOGRAM REPORT: CPT | Performed by: INTERNAL MEDICINE

## 2024-12-02 PROCEDURE — 36415 COLL VENOUS BLD VENIPUNCTURE: CPT

## 2024-12-02 PROCEDURE — 94640 AIRWAY INHALATION TREATMENT: CPT

## 2024-12-02 PROCEDURE — 94664 DEMO&/EVAL PT USE INHALER: CPT

## 2024-12-02 PROCEDURE — 25010000002 CEFTRIAXONE PER 250 MG: Performed by: STUDENT IN AN ORGANIZED HEALTH CARE EDUCATION/TRAINING PROGRAM

## 2024-12-02 PROCEDURE — 84484 ASSAY OF TROPONIN QUANT: CPT | Performed by: STUDENT IN AN ORGANIZED HEALTH CARE EDUCATION/TRAINING PROGRAM

## 2024-12-02 PROCEDURE — 70450 CT HEAD/BRAIN W/O DYE: CPT | Performed by: RADIOLOGY

## 2024-12-02 PROCEDURE — 70551 MRI BRAIN STEM W/O DYE: CPT

## 2024-12-02 PROCEDURE — 70450 CT HEAD/BRAIN W/O DYE: CPT

## 2024-12-02 PROCEDURE — 25810000003 SEPSIS FLUID NS 0.9 % SOLUTION: Performed by: STUDENT IN AN ORGANIZED HEALTH CARE EDUCATION/TRAINING PROGRAM

## 2024-12-02 PROCEDURE — 83735 ASSAY OF MAGNESIUM: CPT | Performed by: STUDENT IN AN ORGANIZED HEALTH CARE EDUCATION/TRAINING PROGRAM

## 2024-12-02 PROCEDURE — 85025 COMPLETE CBC W/AUTO DIFF WBC: CPT | Performed by: STUDENT IN AN ORGANIZED HEALTH CARE EDUCATION/TRAINING PROGRAM

## 2024-12-02 PROCEDURE — 74176 CT ABD & PELVIS W/O CONTRAST: CPT | Performed by: RADIOLOGY

## 2024-12-02 PROCEDURE — 82375 ASSAY CARBOXYHB QUANT: CPT

## 2024-12-02 PROCEDURE — 84145 PROCALCITONIN (PCT): CPT | Performed by: STUDENT IN AN ORGANIZED HEALTH CARE EDUCATION/TRAINING PROGRAM

## 2024-12-02 PROCEDURE — 81001 URINALYSIS AUTO W/SCOPE: CPT | Performed by: STUDENT IN AN ORGANIZED HEALTH CARE EDUCATION/TRAINING PROGRAM

## 2024-12-02 PROCEDURE — 99285 EMERGENCY DEPT VISIT HI MDM: CPT

## 2024-12-02 PROCEDURE — 25010000002 SODIUM CHLORIDE 0.9 % WITH KCL 20 MEQ 20-0.9 MEQ/L-% SOLUTION: Performed by: HOSPITALIST

## 2024-12-02 PROCEDURE — 25010000002 LORAZEPAM PER 2 MG: Performed by: STUDENT IN AN ORGANIZED HEALTH CARE EDUCATION/TRAINING PROGRAM

## 2024-12-02 PROCEDURE — 71045 X-RAY EXAM CHEST 1 VIEW: CPT

## 2024-12-02 PROCEDURE — 85610 PROTHROMBIN TIME: CPT | Performed by: STUDENT IN AN ORGANIZED HEALTH CARE EDUCATION/TRAINING PROGRAM

## 2024-12-02 PROCEDURE — 93005 ELECTROCARDIOGRAM TRACING: CPT | Performed by: STUDENT IN AN ORGANIZED HEALTH CARE EDUCATION/TRAINING PROGRAM

## 2024-12-02 PROCEDURE — 71045 X-RAY EXAM CHEST 1 VIEW: CPT | Performed by: RADIOLOGY

## 2024-12-02 PROCEDURE — 87040 BLOOD CULTURE FOR BACTERIA: CPT | Performed by: STUDENT IN AN ORGANIZED HEALTH CARE EDUCATION/TRAINING PROGRAM

## 2024-12-02 PROCEDURE — 25010000002 ONDANSETRON PER 1 MG: Performed by: STUDENT IN AN ORGANIZED HEALTH CARE EDUCATION/TRAINING PROGRAM

## 2024-12-02 PROCEDURE — 83605 ASSAY OF LACTIC ACID: CPT | Performed by: STUDENT IN AN ORGANIZED HEALTH CARE EDUCATION/TRAINING PROGRAM

## 2024-12-02 PROCEDURE — 94799 UNLISTED PULMONARY SVC/PX: CPT

## 2024-12-02 PROCEDURE — 83050 HGB METHEMOGLOBIN QUAN: CPT

## 2024-12-02 PROCEDURE — 85730 THROMBOPLASTIN TIME PARTIAL: CPT | Performed by: STUDENT IN AN ORGANIZED HEALTH CARE EDUCATION/TRAINING PROGRAM

## 2024-12-02 PROCEDURE — 82805 BLOOD GASES W/O2 SATURATION: CPT

## 2024-12-02 PROCEDURE — 36600 WITHDRAWAL OF ARTERIAL BLOOD: CPT

## 2024-12-02 PROCEDURE — P9612 CATHETERIZE FOR URINE SPEC: HCPCS

## 2024-12-02 PROCEDURE — 99223 1ST HOSP IP/OBS HIGH 75: CPT | Performed by: HOSPITALIST

## 2024-12-02 PROCEDURE — 80053 COMPREHEN METABOLIC PANEL: CPT | Performed by: STUDENT IN AN ORGANIZED HEALTH CARE EDUCATION/TRAINING PROGRAM

## 2024-12-02 PROCEDURE — 25010000002 CALCIUM GLUCONATE 2-0.675 GM/100ML-% SOLUTION: Performed by: STUDENT IN AN ORGANIZED HEALTH CARE EDUCATION/TRAINING PROGRAM

## 2024-12-02 PROCEDURE — 70551 MRI BRAIN STEM W/O DYE: CPT | Performed by: RADIOLOGY

## 2024-12-02 RX ORDER — LISINOPRIL 20 MG/1
20 TABLET ORAL 2 TIMES DAILY
COMMUNITY

## 2024-12-02 RX ORDER — SODIUM CHLORIDE 0.9 % (FLUSH) 0.9 %
10 SYRINGE (ML) INJECTION EVERY 12 HOURS SCHEDULED
Status: DISCONTINUED | OUTPATIENT
Start: 2024-12-02 | End: 2024-12-05 | Stop reason: HOSPADM

## 2024-12-02 RX ORDER — SERTRALINE HYDROCHLORIDE 25 MG/1
25 TABLET, FILM COATED ORAL DAILY
COMMUNITY
End: 2024-12-05 | Stop reason: HOSPADM

## 2024-12-02 RX ORDER — POTASSIUM CHLORIDE 1.5 G/1.58G
40 POWDER, FOR SOLUTION ORAL ONCE
Status: COMPLETED | OUTPATIENT
Start: 2024-12-02 | End: 2024-12-02

## 2024-12-02 RX ORDER — INSULIN LISPRO 100 [IU]/ML
2-7 INJECTION, SOLUTION INTRAVENOUS; SUBCUTANEOUS
Status: DISCONTINUED | OUTPATIENT
Start: 2024-12-03 | End: 2024-12-05 | Stop reason: HOSPADM

## 2024-12-02 RX ORDER — ONDANSETRON 2 MG/ML
4 INJECTION INTRAMUSCULAR; INTRAVENOUS ONCE
Status: COMPLETED | OUTPATIENT
Start: 2024-12-02 | End: 2024-12-02

## 2024-12-02 RX ORDER — DEXTROSE MONOHYDRATE 25 G/50ML
25 INJECTION, SOLUTION INTRAVENOUS
Status: DISCONTINUED | OUTPATIENT
Start: 2024-12-02 | End: 2024-12-05 | Stop reason: HOSPADM

## 2024-12-02 RX ORDER — CALCIUM GLUCONATE 20 MG/ML
2000 INJECTION, SOLUTION INTRAVENOUS
Status: DISPENSED | OUTPATIENT
Start: 2024-12-02 | End: 2024-12-03

## 2024-12-02 RX ORDER — EZETIMIBE 10 MG/1
10 TABLET ORAL DAILY
COMMUNITY

## 2024-12-02 RX ORDER — BISACODYL 5 MG/1
5 TABLET, DELAYED RELEASE ORAL DAILY PRN
Status: DISCONTINUED | OUTPATIENT
Start: 2024-12-02 | End: 2024-12-05 | Stop reason: HOSPADM

## 2024-12-02 RX ORDER — LORAZEPAM 2 MG/ML
0.5 INJECTION INTRAMUSCULAR ONCE
Status: COMPLETED | OUTPATIENT
Start: 2024-12-02 | End: 2024-12-02

## 2024-12-02 RX ORDER — ISOSORBIDE MONONITRATE 60 MG/1
60 TABLET, EXTENDED RELEASE ORAL EVERY MORNING
COMMUNITY

## 2024-12-02 RX ORDER — MAGNESIUM SULFATE HEPTAHYDRATE 40 MG/ML
4 INJECTION, SOLUTION INTRAVENOUS ONCE
Status: COMPLETED | OUTPATIENT
Start: 2024-12-02 | End: 2024-12-02

## 2024-12-02 RX ORDER — SODIUM CHLORIDE AND POTASSIUM CHLORIDE 150; 900 MG/100ML; MG/100ML
100 INJECTION, SOLUTION INTRAVENOUS CONTINUOUS
Status: DISPENSED | OUTPATIENT
Start: 2024-12-02 | End: 2024-12-03

## 2024-12-02 RX ORDER — NICOTINE 21 MG/24HR
1 PATCH, TRANSDERMAL 24 HOURS TRANSDERMAL
Status: DISCONTINUED | OUTPATIENT
Start: 2024-12-03 | End: 2024-12-05 | Stop reason: HOSPADM

## 2024-12-02 RX ORDER — POTASSIUM CHLORIDE 1500 MG/1
20 TABLET, EXTENDED RELEASE ORAL ONCE
Status: COMPLETED | OUTPATIENT
Start: 2024-12-02 | End: 2024-12-02

## 2024-12-02 RX ORDER — CALCIUM GLUCONATE 20 MG/ML
1000 INJECTION, SOLUTION INTRAVENOUS
Status: DISPENSED | OUTPATIENT
Start: 2024-12-02 | End: 2024-12-02

## 2024-12-02 RX ORDER — ASPIRIN 81 MG/1
81 TABLET, CHEWABLE ORAL DAILY
Status: DISCONTINUED | OUTPATIENT
Start: 2024-12-03 | End: 2024-12-05 | Stop reason: HOSPADM

## 2024-12-02 RX ORDER — AMOXICILLIN 250 MG
2 CAPSULE ORAL 2 TIMES DAILY
Status: DISCONTINUED | OUTPATIENT
Start: 2024-12-02 | End: 2024-12-05 | Stop reason: HOSPADM

## 2024-12-02 RX ORDER — GLUCAGON 1 MG/ML
1 KIT INJECTION
Status: DISCONTINUED | OUTPATIENT
Start: 2024-12-02 | End: 2024-12-05 | Stop reason: HOSPADM

## 2024-12-02 RX ORDER — DESVENLAFAXINE 50 MG/1
50 TABLET, FILM COATED, EXTENDED RELEASE ORAL DAILY
Status: ON HOLD | COMMUNITY
End: 2024-12-03

## 2024-12-02 RX ORDER — AMLODIPINE BESYLATE 10 MG/1
10 TABLET ORAL DAILY
Status: DISCONTINUED | OUTPATIENT
Start: 2024-12-02 | End: 2024-12-03

## 2024-12-02 RX ORDER — NICOTINE POLACRILEX 4 MG
15 LOZENGE BUCCAL
Status: DISCONTINUED | OUTPATIENT
Start: 2024-12-02 | End: 2024-12-05 | Stop reason: HOSPADM

## 2024-12-02 RX ORDER — NITROGLYCERIN 0.4 MG/1
0.4 TABLET SUBLINGUAL
Status: DISCONTINUED | OUTPATIENT
Start: 2024-12-02 | End: 2024-12-05 | Stop reason: HOSPADM

## 2024-12-02 RX ORDER — SODIUM CHLORIDE 9 MG/ML
40 INJECTION, SOLUTION INTRAVENOUS AS NEEDED
Status: DISCONTINUED | OUTPATIENT
Start: 2024-12-02 | End: 2024-12-05 | Stop reason: HOSPADM

## 2024-12-02 RX ORDER — SODIUM CHLORIDE 0.9 % (FLUSH) 0.9 %
10 SYRINGE (ML) INJECTION AS NEEDED
Status: DISCONTINUED | OUTPATIENT
Start: 2024-12-02 | End: 2024-12-05 | Stop reason: HOSPADM

## 2024-12-02 RX ORDER — POLYETHYLENE GLYCOL 3350 17 G/17G
17 POWDER, FOR SOLUTION ORAL DAILY PRN
Status: DISCONTINUED | OUTPATIENT
Start: 2024-12-02 | End: 2024-12-05 | Stop reason: HOSPADM

## 2024-12-02 RX ORDER — BUDESONIDE 0.5 MG/2ML
0.5 INHALANT ORAL
Status: DISCONTINUED | OUTPATIENT
Start: 2024-12-02 | End: 2024-12-05 | Stop reason: HOSPADM

## 2024-12-02 RX ORDER — BISACODYL 10 MG
10 SUPPOSITORY, RECTAL RECTAL DAILY PRN
Status: DISCONTINUED | OUTPATIENT
Start: 2024-12-02 | End: 2024-12-05 | Stop reason: HOSPADM

## 2024-12-02 RX ORDER — ARFORMOTEROL TARTRATE 15 UG/2ML
15 SOLUTION RESPIRATORY (INHALATION)
Status: DISCONTINUED | OUTPATIENT
Start: 2024-12-02 | End: 2024-12-05 | Stop reason: HOSPADM

## 2024-12-02 RX ORDER — ROSUVASTATIN CALCIUM 40 MG/1
40 TABLET, COATED ORAL DAILY
COMMUNITY

## 2024-12-02 RX ADMIN — POTASSIUM CHLORIDE 40 MEQ: 1.5 POWDER, FOR SOLUTION ORAL at 18:30

## 2024-12-02 RX ADMIN — ARFORMOTEROL TARTRATE 15 MCG: 15 SOLUTION RESPIRATORY (INHALATION) at 23:05

## 2024-12-02 RX ADMIN — LORAZEPAM 0.5 MG: 2 INJECTION INTRAMUSCULAR; INTRAVENOUS at 14:00

## 2024-12-02 RX ADMIN — Medication 10 ML: at 22:55

## 2024-12-02 RX ADMIN — CEFTRIAXONE 1000 MG: 1 INJECTION, POWDER, FOR SOLUTION INTRAMUSCULAR; INTRAVENOUS at 18:33

## 2024-12-02 RX ADMIN — MAGNESIUM SULFATE HEPTAHYDRATE 4 G: 40 INJECTION, SOLUTION INTRAVENOUS at 18:31

## 2024-12-02 RX ADMIN — IPRATROPIUM BROMIDE 0.5 MG: 0.5 SOLUTION RESPIRATORY (INHALATION) at 23:05

## 2024-12-02 RX ADMIN — CALCIUM GLUCONATE 2000 MG: 20 INJECTION, SOLUTION INTRAVENOUS at 21:36

## 2024-12-02 RX ADMIN — POTASSIUM CHLORIDE 20 MEQ: 1500 TABLET, EXTENDED RELEASE ORAL at 18:39

## 2024-12-02 RX ADMIN — ONDANSETRON 4 MG: 2 INJECTION INTRAMUSCULAR; INTRAVENOUS at 14:00

## 2024-12-02 RX ADMIN — APIXABAN 5 MG: 5 TABLET, FILM COATED ORAL at 22:53

## 2024-12-02 RX ADMIN — BUDESONIDE 0.5 MG: 0.5 SUSPENSION RESPIRATORY (INHALATION) at 23:05

## 2024-12-02 RX ADMIN — CALCIUM GLUCONATE 2000 MG: 20 INJECTION, SOLUTION INTRAVENOUS at 18:31

## 2024-12-02 RX ADMIN — AMLODIPINE BESYLATE 10 MG: 10 TABLET ORAL at 22:53

## 2024-12-02 RX ADMIN — POTASSIUM CHLORIDE AND SODIUM CHLORIDE 100 ML/HR: 900; 150 INJECTION, SOLUTION INTRAVENOUS at 22:54

## 2024-12-02 RX ADMIN — SENNOSIDES AND DOCUSATE SODIUM 2 TABLET: 50; 8.6 TABLET ORAL at 22:53

## 2024-12-02 RX ADMIN — SODIUM CHLORIDE 3270 ML: 9 INJECTION, SOLUTION INTRAVENOUS at 14:00

## 2024-12-02 NOTE — H&P
[facility]    Patient Identification:  Name:  Leisa Vasquez  Age:  68 y.o.  Sex:  female  :  1955  MRN:  [id number]   Visit Number:  [id number]  Admit Date: 2024   Room number:  110/10  Primary Care Physician:  Leisa Balderas MD     Chief complaint:    Chief Complaint   Patient presents with    Vomiting       History of presenting illness:  68 y.o. female with history significant for COPD continued tobacco use history of PE on chronic anticoagulation presented to emergency room with chief complaints of severe dizziness.  As per wife supplemented by  who lives with the patient dizziness started 3 days ago severe, she cannot describe whether it subjective or objective type, no tinnitus.  Any slight movement of her head causes her to have severe dizziness followed by nausea and vomiting.  She denies recent rhinorrhea URI symptoms sneezing or coughing.  She denies tinnitus.  Due to persistent symptoms she decided to come to the emergency room.  At the emergency room she was noted to have atrial fibrillation but rate controlled, she denies history of cardiac arrhythmia.  stated similar symptoms occurred 3 weeks ago but was self-limited.    Of note a week ago patient was seen by her primary care provider,  she was very hypoxic, she was advised to get admitted but patient refused.      Today at the emergency room her O2 sat was 95 to 96% on 2 L nasal cannula.  ER provider noted she has horizontal nystagmus bilateral, she received a dose of Ativan IV which relieved nystagmus but dizziness continued.    ---------------------------------------------------------------------------------------------------------------------   Review of Systems   Constitutional:  Positive for appetite change. Negative for activity change, chills, diaphoresis, fatigue, fever and unexpected weight change.   HENT: Negative.     Eyes: Negative.    Respiratory: Negative.      Cardiovascular: Negative.    Gastrointestinal:  Positive for nausea and vomiting. Negative for diarrhea.   Endocrine: Negative.    Genitourinary: Negative.    Musculoskeletal: Negative.    Skin: Negative.    Allergic/Immunologic: Negative.    Neurological:  Positive for dizziness. Negative for tremors, seizures, syncope, speech difficulty, weakness, light-headedness, numbness and headaches.   Hematological: Negative.    Psychiatric/Behavioral:  Positive for decreased concentration.        ---------------------------------------------------------------------------------------------------------------------   Past Medical History:   Diagnosis Date    Arthritis     COPD (chronic obstructive pulmonary disease)     Coronary artery disease s/p statnting int he past 12/22/2020    Diabetes     Diabetes mellitus     Essential hypertension 12/22/2020    Heart & renal disease, hypertensive malignant with heart failure     Heart attack     History of bronchitis     Hyperlipidemia 12/22/2020    Low back pain     Obesity (BMI 30-39.9) 12/22/2020    Pneumonia     Pulmonary embolism      Past Surgical History:   Procedure Laterality Date    CHOLECYSTECTOMY      CORONARY STENT PLACEMENT  02/08/2013    3.0x23mm xieuce mid RCA done at ; LAD [id number] Ref #[id number] done at [facility] 12/29/12    HYSTERECTOMY       Family History   Problem Relation Age of Onset    Cancer Mother     No Known Problems Father      Social History     Socioeconomic History    Marital status:    Tobacco Use    Smoking status: Every Day     Current packs/day: 0.50     Average packs/day: 0.5 packs/day for 30.0 years (15.0 ttl pk-yrs)     Types: Cigarettes    Smokeless tobacco: Never   Vaping Use    Vaping status: Never Used   Substance and Sexual Activity    Alcohol use: No    Drug use: No    Sexual activity: Defer     ---------------------------------------------------------------------------------------------------------------------    Allergies:  Codeine  ---------------------------------------------------------------------------------------------------------------------   Prior to Admission Medications       Prescriptions Last Dose Informant Patient Reported? Taking?    amLODIPine (NORVASC) 10 MG tablet  Pharmacy Yes No    Take 10 mg by mouth Daily.    apixaban (ELIQUIS) 5 MG tablet tablet   No No    Take 1 tablet by mouth Every 12 (Twelve) Hours. Indications: DVT/PE (active thrombosis)    aspirin 81 MG chewable tablet  Self Yes No    Chew 81 mg Daily.    atorvastatin (LIPITOR) 80 MG tablet  Pharmacy Yes No    Take 80 mg by mouth Daily.    bisacodyl (bisacodyl) 5 MG EC tablet   No No    Take 2 at 3pm and 2 at 7pm the day prior to colonoscopy.    bumetanide (BUMEX) 2 MG tablet   No No    Take 1 tablet by mouth Daily for 30 days.    carvedilol (COREG) 25 MG tablet  Pharmacy Yes No    Take 25 mg by mouth 2 (Two) Times a Day With Meals.    citalopram (CeleXA) 20 MG tablet  Pharmacy Yes No    Take 20 mg by mouth Daily.    dexamethasone (DECADRON) 4 MG tablet   No No    Take 1 tablet by mouth 2 (Two) Times a Day With Meals.    doxycycline (MONODOX) 100 MG capsule   No No    Take 1 capsule by mouth Every 12 (Twelve) Hours.    ipratropium-albuterol (DUO-NEB) 0.5-2.5 mg/3 ml nebulizer   Yes No    Take 3 mL by nebulization Every 6 (Six) Hours As Needed for Wheezing.    metFORMIN (GLUCOPHAGE) 500 MG tablet   No No    Take 0.5 tablets by mouth 2 (Two) Times a Day With Meals.    nitroglycerin (NITROSTAT) 0.4 MG SL tablet   Yes No    Place 0.4 mg under the tongue Every 5 (Five) Minutes As Needed for Chest Pain. Take no more than 3 doses in 15 minutes.    omeprazole (priLOSEC) 20 MG capsule  Pharmacy Yes No    Take 20 mg by mouth Daily.    oxyCODONE-acetaminophen (PERCOCET) 5-325 MG per tablet   No No    Take 1 tablet by mouth Every 6 (Six) Hours As Needed for Severe Pain .    polyethylene glycol (MIRALAX) 17 GM/SCOOP powder   No No    Mix 238g powder with  64 oz of clear liquid. Starting at 5pm drink 80z every 10-15 minutes until consumed.          ---------------------------------------------------------------------------------------------------------------------   Vital Signs:  Temp:  [96.2 °F (35.7 °C)] 96.2 °F (35.7 °C)  Heart Rate:  [104] 104  Resp:  [35] 35  BP: (134)/(90) 134/90    Mean Arterial Pressure (Non-Invasive) for the past 24 hrs (Last 3 readings):   Noninvasive MAP (mmHg)   12/02/24 1333 110     SpO2:  [76 %-93 %] 76 %  on  Flow (L/min) (Oxygen Therapy):  [2] 2;   Device (Oxygen Therapy): nasal cannula  Body mass index is 37.63 kg/m².    Wt Readings from Last 3 Encounters:   12/02/24 109 kg (240 lb 4.8 oz)   08/26/23 109 kg (240 lb)   09/20/21 115 kg (253 lb)               ---------------------------------------------------------------------------------------------------------------------   Physical Exam:  Constitutional: Patient is in stretcher, short attention span, answers question but slow, oriented to self place unsure about the year.  Follows commands.   No respiratory distress.      HENT:  Head: Normocephalic and atraumatic.  Mouth:  Moist mucous membranes.  No tongue fasciculation, tympanic membrane both clear no fluid.  Eyes:  Conjunctivae and EOM are normal.  Pupils are equal, round, and reactive to light.  No scleral icterus.  No nystagmus  Neck:  Neck supple.  No JVD present.  No bruit  Cardiovascular:  Normal rate, regular rhythm and normal heart sounds with no murmur.  Pulmonary/Chest:  No respiratory distress, very rare scattered wheezing no expiratory prolongation , no crackles, with normal breath sounds and good air movement.  Abdominal:  Soft.  Bowel sounds are normal.  No distension and no tenderness.  Obese  Musculoskeletal:  No edema, no tenderness, and no deformity.  No red or swollen joints anywhere.    Neurological:  Alert and oriented to person, place, but not sure of the year.  No cranial nerve deficit.  No tongue deviation.   "No facial droop.  No slurred speech.  No dysmetria, no pronator drift, no dysdiadochokinesis  Skin:  Skin is warm and dry.  No rash noted.  No pallor.   Peripheral vascular:  No edema and strong pulses on all 4 extremities.  Genitourinary:  ---------------------------------------------------------------------------------------------------------------------  EKG:        Telemetry: Atrial fibrillation 92 bpm O2 sat of 95% on 2 L  I have personally looked at both the EKG and the telemetry strips.  --------------------------------------------------------------------------------------------------------------------  Results from last 7 days   Lab Units 12/02/24  1502   HSTROP T ng/L 19*     Results from last 7 days   Lab Units 12/02/24  1342   LACTATE mmol/L 2.6*   WBC 10*3/mm3 17.06*   HEMOGLOBIN g/dL 14.6   HEMATOCRIT % 45.2   MCV fL 92.2   MCHC g/dL 32.3   PLATELETS 10*3/mm3 308   INR  1.48*     Results from last 7 days   Lab Units 12/02/24  1502   SODIUM mmol/L 138   POTASSIUM mmol/L 2.6*   MAGNESIUM mg/dL 0.3*   CHLORIDE mmol/L 91*   CO2 mmol/L 29.6*   BUN mg/dL 12   CREATININE mg/dL 0.56*   CALCIUM mg/dL 6.6*   GLUCOSE mg/dL 277*   ALBUMIN g/dL 3.4*   BILIRUBIN mg/dL 1.4*   ALK PHOS U/L 54   AST (SGOT) U/L 15   ALT (SGPT) U/L 17   Estimated Creatinine Clearance: 125.7 mL/min (A) (by C-G formula based on SCr of 0.56 mg/dL (L)).    No results found for: \"HGBA1C\", \"POCGLU\"  No results found for: \"AMMONIA\"    Results from last 7 days   Lab Units 12/02/24  1345   NITRITE UA  Negative   WBC UA /HPF 0-2   BACTERIA UA /HPF None Seen   SQUAM EPITHEL UA /HPF 0-2     No results found for: \"BLOODCX\"No results found for: \"RESPCX\"No results found for: \"URINECX\"No results found for: \"WOUNDCX\"No results found for: \"BODYFLDCX\"No results found for: \"STOOLCX\"  pH No results found for: \"PHART\"   pO2 No results found for: \"PO2ART\"   pCO2 No results found for: \"XBJ1PSH\"   HCO3 No results found for: \"QAX2RSY\"     I have personally looked " at the labs and they are summarized above.  ----------------------------------------------------------------------------------------------------------------------  Imaging Results (Last 24 Hours)       Procedure Component Value Units Date/Time    XR Chest 1 View [[id number]] Collected: 12/02/24 1528     Updated: 12/02/24 1531    Narrative:      PROCEDURE: XR CHEST 1 VW-       HISTORY: Severe Sepsis Protocol     COMPARISON: 12/21/2020.     FINDINGS: The heart is normal in size. The mediastinum is unremarkable.  The lungs are clear. There is no pneumothorax. There are no acute  osseous abnormalities.       Impression:      No acute cardiopulmonary process.        This report was finalized on 12/2/2024 3:29 PM by Jackie More M.D..       CT Head Without Contrast [[id number]] Collected: 12/02/24 1503     Updated: 12/02/24 1506    Narrative:      PROCEDURE: CT HEAD WO CONTRAST-     HISTORY: AMS     COMPARISON: 12/22/2020.     TECHNIQUE: Multiple axial CT images were performed from the foramen  magnum to the vertex without enhancement. This study was performed with  techniques to keep radiation doses as low as reasonably achievable  (ALARA). Individualized dose reduction techniques using automated  exposure control or adjustment of mA and/or kV according to the patient  size were employed.     FINDINGS: There is no CT evidence of hemorrhage. There is no mass, mass  effect or midline shift.  There is no hydrocephalus. The paranasal  sinuses are clear. Bone windows reveal no acute osseous abnormalities.       Impression:      No acute intracranial process.              This report was finalized on 12/2/2024 3:04 PM by Jackie More M.D..       CT Abdomen Pelvis Without Contrast [[id number]] Collected: 12/02/24 1455     Updated: 12/02/24 1454    Narrative:      PROCEDURE: CT ABDOMEN PELVIS WO CONTRAST-     HISTORY: Pain w/ vomiting     COMPARISON: None .     PROCEDURE: Axial images were obtained from the lung bases  through the  pubic symphysis without intravenous contrast. . This study was performed  with techniques to keep radiation doses as low as reasonably achievable  (ALARA). Individualized dose reduction techniques using automated  exposure control or adjustment of mA and/or kV according to the patient  size were employed.     FINDINGS:     ABDOMEN: There are bilateral pleural effusions and bibasilar  atelectasis. The heart size is normal. Noncontrast images of the liver  are unremarkable with no focal hepatic lesionsl. The spleen is normal.  No adrenal masses are seen.  The pancreas has an unremarkable unenhanced  appearance.. The aorta is normal in caliber. There is no significant  free fluid or adenopathy. There is no nephrolithiasis. There is no  hydronephrosis. Limited noncontrast images of the bowel are  unremarkable.     PELVIS: The appendix is not identified. There are a few scattered  colonic diverticula without evidence of diverticulitis. The patient is  status post hysterectomy. The urinary bladder is unremarkable. There is  no significant fluid or adenopathy. Bone windows reveal no acute osseous  abnormalities or lytic/destructive lesions. The extraperitoneal soft  tissues are unremarkable.       Impression:      No hydronephrosis or nephrolithiasis.              This report was finalized on 12/2/2024 2:57 PM by Jackie More M.D..             I have personally reviewed the radiology images and read the final radiology report.  ----------------------------------------------------------------------------------------------------------------------  Assessment and Plan:  -Severe dizziness  -Nausea and vomiting secondary to severe dizziness  -New onset atrial fibrillation so far rate controlled CHADS-VASc score of 7  -Severe electrolyte derangement including hypomagnesemia severe, hypocalcemia and hypokalemia  -Diabetes type 2 with hyperglycemia  -History of COPD without exacerbation  -Continued tobacco use  disorder  -History of PE on chronic anticoagulation  -Obesity with BMI of 38 complicating all aspects of care  -Chronic hypoxic respiratory failure on oxygen supplementation at home  -History of essential hypertension  -History of hyperlipidemia    MRI of the brain ordered, neurology will be consulted while at the emergency room, aggressively replace electrolytes, gentle hydration, fall precautions, PT OT, continue home medication including anticoagulation, 2D echo.    With regards to the new onset atrial fibrillation, will monitor and continue anticoagulation, rate control medication if needed.  2D echo.  Replace electrolytes, if A-fib is persistent will consult cardiology.    Patient will be admitted to telemetry as outlined above.    Brigida Du MD  12/02/24  17:33 EST

## 2024-12-02 NOTE — ED PROVIDER NOTES
Subjective   History of Present Illness  68-year-old female with a past medical history of COPD, coronary disease, hypertension, diabetes, and hyperlipidemia presents to the ER with primary concerns for altered mental status, dizziness, and nausea with vomiting.  Patient does appear to be confused.  However, she will follow commands.  Horizontal nystagmus noted in both eyes.  No obvious focal neurological deficits.  Patient denies chest pain.  Confirmed abdominal pain.  Patient follows commands without hesitation.  Some confusion on place and time.  No dysarthria.  No aphasia.  Blood pressure stable.      Review of Systems   Gastrointestinal:  Positive for abdominal pain, nausea and vomiting.   Psychiatric/Behavioral:  Positive for confusion.    All other systems reviewed and are negative.      Past Medical History:   Diagnosis Date    Arthritis     COPD (chronic obstructive pulmonary disease)     Coronary artery disease s/p statnting int he past 12/22/2020    Diabetes     Diabetes mellitus     Essential hypertension 12/22/2020    Heart & renal disease, hypertensive malignant with heart failure     Heart attack     History of bronchitis     Hyperlipidemia 12/22/2020    Low back pain     Obesity (BMI 30-39.9) 12/22/2020    Pneumonia     Pulmonary embolism        Allergies   Allergen Reactions    Codeine Other (See Comments) and Unknown (See Comments)     Lethargic       Past Surgical History:   Procedure Laterality Date    CHOLECYSTECTOMY      CORONARY STENT PLACEMENT  02/08/2013    3.0x23mm xieuce mid RCA done at ; LAD 2.85prq45gg Ref #[id number] done at [facility] 12/29/12    HYSTERECTOMY         Family History   Problem Relation Age of Onset    Cancer Mother     No Known Problems Father        Social History     Socioeconomic History    Marital status:    Tobacco Use    Smoking status: Every Day     Current packs/day: 0.50     Average packs/day: 0.5 packs/day for 30.0 years (15.0 ttl pk-yrs)      Types: Cigarettes    Smokeless tobacco: Never   Vaping Use    Vaping status: Never Used   Substance and Sexual Activity    Alcohol use: No    Drug use: No    Sexual activity: Defer           Objective   Physical Exam  Constitutional:       General: She is not in acute distress.     Appearance: Normal appearance. She is not ill-appearing.   HENT:      Head: Normocephalic and atraumatic.      Right Ear: External ear normal.      Left Ear: External ear normal.      Nose: Nose normal.      Mouth/Throat:      Mouth: Mucous membranes are moist.   Eyes:      Extraocular Movements: Extraocular movements intact.      Pupils: Pupils are equal, round, and reactive to light.   Cardiovascular:      Rate and Rhythm: Normal rate and regular rhythm.      Heart sounds: No murmur heard.  Pulmonary:      Effort: Pulmonary effort is normal. No respiratory distress.      Breath sounds: Normal breath sounds. No wheezing.   Abdominal:      General: Bowel sounds are normal.      Palpations: Abdomen is soft.      Tenderness: There is no abdominal tenderness.   Musculoskeletal:         General: No deformity or signs of injury. Normal range of motion.      Cervical back: Normal range of motion and neck supple.   Skin:     General: Skin is warm and dry.      Findings: No erythema.   Neurological:      General: No focal deficit present.      Mental Status: She is alert. She is disoriented and confused.      Cranial Nerves: No cranial nerve deficit.      Comments: Horizontal nystagmus noted in both eyes.   Psychiatric:         Mood and Affect: Mood normal.         Behavior: Behavior normal.         Thought Content: Thought content normal.         Procedures           ED Course  ED Course as of 12/02/24 1720   Mon Dec 02, 2024   1538 EKG notes atrial fibrillation.  99 bpm.  No acute ST elevation.  QTc 464.  Nonspecific ST changes.  Electronically signed by Jack Francis DO, 12/02/24, 3:39 PM EST.   [SF]      ED Course User Index  [SF] Tito  Jack GRAY DO                                        XR Chest 1 View    Result Date: 12/2/2024  No acute cardiopulmonary process.   This report was finalized on 12/2/2024 3:29 PM by Jackie More M.D..      CT Head Without Contrast    Result Date: 12/2/2024  No acute intracranial process.     This report was finalized on 12/2/2024 3:04 PM by Jackie More M.D..      CT Abdomen Pelvis Without Contrast    Result Date: 12/2/2024  No hydronephrosis or nephrolithiasis.     This report was finalized on 12/2/2024 2:57 PM by Jackie More M.D..       Results for orders placed or performed during the hospital encounter of 12/02/24   Protime-INR    Collection Time: 12/02/24  1:42 PM    Specimen: Arm, Right; Blood   Result Value Ref Range    Protime 18.0 (H) 12.1 - 14.7 Seconds    INR 1.48 (H) 0.90 - 1.10   aPTT    Collection Time: 12/02/24  1:42 PM    Specimen: Arm, Right; Blood   Result Value Ref Range    PTT 26.2 (L) 26.5 - 34.5 seconds   Lactic Acid, Plasma    Collection Time: 12/02/24  1:42 PM    Specimen: Arm, Right; Blood   Result Value Ref Range    Lactate 2.6 (C) 0.5 - 2.0 mmol/L   CBC Auto Differential    Collection Time: 12/02/24  1:42 PM    Specimen: Arm, Right; Blood   Result Value Ref Range    WBC 17.06 (H) 3.40 - 10.80 10*3/mm3    RBC 4.90 3.77 - 5.28 10*6/mm3    Hemoglobin 14.6 12.0 - 15.9 g/dL    Hematocrit 45.2 34.0 - 46.6 %    MCV 92.2 79.0 - 97.0 fL    MCH 29.8 26.6 - 33.0 pg    MCHC 32.3 31.5 - 35.7 g/dL    RDW 13.1 12.3 - 15.4 %    RDW-SD 43.7 37.0 - 54.0 fl    MPV 10.2 6.0 - 12.0 fL    Platelets 308 140 - 450 10*3/mm3    Neutrophil % 77.4 (H) 42.7 - 76.0 %    Lymphocyte % 14.8 (L) 19.6 - 45.3 %    Monocyte % 7.1 5.0 - 12.0 %    Eosinophil % 0.0 (L) 0.3 - 6.2 %    Basophil % 0.2 0.0 - 1.5 %    Immature Grans % 0.5 0.0 - 0.5 %    Neutrophils, Absolute 13.20 (H) 1.70 - 7.00 10*3/mm3    Lymphocytes, Absolute 2.53 0.70 - 3.10 10*3/mm3    Monocytes, Absolute 1.21 (H) 0.10 - 0.90 10*3/mm3     Eosinophils, Absolute 0.00 0.00 - 0.40 10*3/mm3    Basophils, Absolute 0.04 0.00 - 0.20 10*3/mm3    Immature Grans, Absolute 0.08 (H) 0.00 - 0.05 10*3/mm3    nRBC 0.0 0.0 - 0.2 /100 WBC   Green Top (Gel)    Collection Time: 12/02/24  1:42 PM   Result Value Ref Range    Extra Tube Hold for add-ons.    Lavender Top    Collection Time: 12/02/24  1:42 PM   Result Value Ref Range    Extra Tube hold for add-on    Gold Top - SST    Collection Time: 12/02/24  1:42 PM   Result Value Ref Range    Extra Tube Hold for add-ons.    Light Blue Top    Collection Time: 12/02/24  1:42 PM   Result Value Ref Range    Extra Tube Hold for add-ons.    Urinalysis With Microscopic If Indicated (No Culture) - Straight Cath    Collection Time: 12/02/24  1:45 PM    Specimen: Straight Cath; Urine   Result Value Ref Range    Color, UA Yellow Yellow, Straw    Appearance, UA Clear Clear    pH, UA 8.0 5.0 - 8.0    Specific Gravity, UA 1.012 1.005 - 1.030    Glucose, UA Negative Negative    Ketones, UA 15 mg/dL (1+) (A) Negative    Bilirubin, UA Negative Negative    Blood, UA Small (1+) (A) Negative    Protein, UA >=300 mg/dL (3+) (A) Negative    Leuk Esterase, UA Negative Negative    Nitrite, UA Negative Negative    Urobilinogen, UA 0.2 E.U./dL 0.2 - 1.0 E.U./dL   Urinalysis, Microscopic Only - Straight Cath    Collection Time: 12/02/24  1:45 PM    Specimen: Straight Cath; Urine   Result Value Ref Range    RBC, UA None Seen None Seen, 0-2 /HPF    WBC, UA 0-2 None Seen, 0-2 /HPF    Bacteria, UA None Seen None Seen /HPF    Squamous Epithelial Cells, UA 0-2 None Seen, 0-2 /HPF    Hyaline Casts, UA None Seen None Seen /LPF    Methodology Manual Light Microscopy    Comprehensive Metabolic Panel    Collection Time: 12/02/24  3:02 PM    Specimen: Hand, Right; Blood   Result Value Ref Range    Glucose 277 (H) 65 - 99 mg/dL    BUN 12 8 - 23 mg/dL    Creatinine 0.56 (L) 0.57 - 1.00 mg/dL    Sodium 138 136 - 145 mmol/L    Potassium 2.6 (C) 3.5 - 5.2 mmol/L     Chloride 91 (L) 98 - 107 mmol/L    CO2 29.6 (H) 22.0 - 29.0 mmol/L    Calcium 6.6 (L) 8.6 - 10.5 mg/dL    Total Protein 7.0 6.0 - 8.5 g/dL    Albumin 3.4 (L) 3.5 - 5.2 g/dL    ALT (SGPT) 17 1 - 33 U/L    AST (SGOT) 15 1 - 32 U/L    Alkaline Phosphatase 54 39 - 117 U/L    Total Bilirubin 1.4 (H) 0.0 - 1.2 mg/dL    Globulin 3.6 gm/dL    A/G Ratio 0.9 g/dL    BUN/Creatinine Ratio 21.4 7.0 - 25.0    Anion Gap 17.4 (H) 5.0 - 15.0 mmol/L    eGFR 100.8 >60.0 mL/min/1.73   Procalcitonin    Collection Time: 12/02/24  3:02 PM    Specimen: Hand, Right; Blood   Result Value Ref Range    Procalcitonin 0.03 0.00 - 0.25 ng/mL   High Sensitivity Troponin T    Collection Time: 12/02/24  3:02 PM    Specimen: Hand, Right; Blood   Result Value Ref Range    HS Troponin T 19 (H) <14 ng/L   Magnesium    Collection Time: 12/02/24  3:02 PM    Specimen: Hand, Right; Blood   Result Value Ref Range    Magnesium 0.3 (C) 1.6 - 2.4 mg/dL   ECG 12 Lead Other; Sepsis    Collection Time: 12/02/24  3:32 PM   Result Value Ref Range    QT Interval 362 ms    QTC Interval 464 ms                   Medical Decision Making  CBC notes leukocytosis.  Multiple electrolyte abnormalities noted on CMP.  Hypokalemia.  Hypomagnesemia.  Hypocalcemia.  Replacement protocols initiated.  Cannot rule out metabolic encephalopathy.  CT imaging of the head without contrast revealed no acute abnormality.  CT imaging of the abdomen and pelvis also revealed no acute abnormality.  Patient received IV fluids.  Patient received IV antibiotics.  Recommend admission for further work up and treatment.  Hospitalist team consulted and made aware of the patient.  Consults and orders placed per hospitalist request.  Patient was agreeable to admission plan.  Vitals stable on admission.    Problems Addressed:  Metabolic encephalopathy: complicated acute illness or injury    Amount and/or Complexity of Data Reviewed  Labs: ordered. Decision-making details documented in ED  Course.  Radiology: ordered. Decision-making details documented in ED Course.  ECG/medicine tests: ordered.    Risk  OTC drugs.  Prescription drug management.        Final diagnoses:   Metabolic encephalopathy   Hypomagnesemia   Hypokalemia   Hypocalcemia       ED Disposition  ED Disposition       ED Disposition   Intended Admit    Condition   --    Comment   --               No follow-up provider specified.       Medication List      No changes were made to your prescriptions during this visit.            Jack Francis,   12/02/24 4542

## 2024-12-03 ENCOUNTER — APPOINTMENT (OUTPATIENT)
Dept: CARDIOLOGY | Facility: HOSPITAL | Age: 69
End: 2024-12-03
Payer: MEDICARE

## 2024-12-03 ENCOUNTER — APPOINTMENT (OUTPATIENT)
Dept: MRI IMAGING | Facility: HOSPITAL | Age: 69
End: 2024-12-03
Payer: MEDICARE

## 2024-12-03 ENCOUNTER — APPOINTMENT (OUTPATIENT)
Dept: CT IMAGING | Facility: HOSPITAL | Age: 69
End: 2024-12-03
Payer: MEDICARE

## 2024-12-03 LAB
A-A DO2: 55.6 MMHG (ref 0–300)
AMPHET+METHAMPHET UR QL: NEGATIVE
AMPHETAMINES UR QL: NEGATIVE
ANION GAP SERPL CALCULATED.3IONS-SCNC: 11.2 MMOL/L (ref 5–15)
ARTERIAL PATENCY WRIST A: ABNORMAL
ATMOSPHERIC PRESS: 736 MMHG
BARBITURATES UR QL SCN: NEGATIVE
BASE EXCESS BLDA CALC-SCNC: 8.1 MMOL/L (ref 0–2)
BASOPHILS # BLD AUTO: 0.02 10*3/MM3 (ref 0–0.2)
BASOPHILS NFR BLD AUTO: 0.1 % (ref 0–1.5)
BDY SITE: ABNORMAL
BENZODIAZ UR QL SCN: NEGATIVE
BH CV ECHO MEAS - ACS: 1.5 CM
BH CV ECHO MEAS - AO MAX PG: 3.3 MMHG
BH CV ECHO MEAS - AO MEAN PG: 2 MMHG
BH CV ECHO MEAS - AO ROOT DIAM: 2.5 CM
BH CV ECHO MEAS - AO V2 MAX: 90.6 CM/SEC
BH CV ECHO MEAS - AO V2 VTI: 15.2 CM
BH CV ECHO MEAS - EDV(CUBED): 110.6 ML
BH CV ECHO MEAS - EDV(MOD-SP2): 74.5 ML
BH CV ECHO MEAS - EDV(MOD-SP4): 55 ML
BH CV ECHO MEAS - EF(MOD-BP): 45 %
BH CV ECHO MEAS - EF(MOD-SP2): 38.5 %
BH CV ECHO MEAS - EF(MOD-SP4): 50.9 %
BH CV ECHO MEAS - ESV(CUBED): 10.6 ML
BH CV ECHO MEAS - ESV(MOD-SP2): 45.8 ML
BH CV ECHO MEAS - ESV(MOD-SP4): 27 ML
BH CV ECHO MEAS - FS: 54.2 %
BH CV ECHO MEAS - IVS/LVPW: 0.9 CM
BH CV ECHO MEAS - IVSD: 0.9 CM
BH CV ECHO MEAS - LA DIMENSION: 4.1 CM
BH CV ECHO MEAS - LAT PEAK E' VEL: 7.1 CM/SEC
BH CV ECHO MEAS - LV DIASTOLIC VOL/BSA (35-75): 26 CM2
BH CV ECHO MEAS - LV MASS(C)D: 158.8 GRAMS
BH CV ECHO MEAS - LV SYSTOLIC VOL/BSA (12-30): 12.7 CM2
BH CV ECHO MEAS - LVIDD: 4.8 CM
BH CV ECHO MEAS - LVIDS: 2.2 CM
BH CV ECHO MEAS - LVOT AREA: 3.1 CM2
BH CV ECHO MEAS - LVOT DIAM: 2 CM
BH CV ECHO MEAS - LVPWD: 1 CM
BH CV ECHO MEAS - MED PEAK E' VEL: 6 CM/SEC
BH CV ECHO MEAS - MV E MAX VEL: 116 CM/SEC
BH CV ECHO MEAS - PA ACC TIME: 0.1 SEC
BH CV ECHO MEAS - RAP SYSTOLE: 10 MMHG
BH CV ECHO MEAS - RVSP: 30.1 MMHG
BH CV ECHO MEAS - SV(MOD-SP2): 28.7 ML
BH CV ECHO MEAS - SV(MOD-SP4): 28 ML
BH CV ECHO MEAS - SVI(MOD-SP2): 13.6 ML/M2
BH CV ECHO MEAS - SVI(MOD-SP4): 13.2 ML/M2
BH CV ECHO MEAS - TR MAX PG: 20.1 MMHG
BH CV ECHO MEAS - TR MAX VEL: 224 CM/SEC
BH CV ECHO MEASUREMENTS AVERAGE E/E' RATIO: 17.71
BUN SERPL-MCNC: 12 MG/DL (ref 8–23)
BUN/CREAT SERPL: 18.8 (ref 7–25)
BUPRENORPHINE SERPL-MCNC: NEGATIVE NG/ML
CALCIUM SPEC-SCNC: 7.5 MG/DL (ref 8.6–10.5)
CANNABINOIDS SERPL QL: NEGATIVE
CHLORIDE SERPL-SCNC: 95 MMOL/L (ref 98–107)
CO2 BLDA-SCNC: 33.9 MMOL/L (ref 22–33)
CO2 SERPL-SCNC: 33.8 MMOL/L (ref 22–29)
COCAINE UR QL: NEGATIVE
COHGB MFR BLD: 1.7 % (ref 0–5)
CREAT SERPL-MCNC: 0.64 MG/DL (ref 0.57–1)
DEPRECATED RDW RBC AUTO: 45.7 FL (ref 37–54)
EGFRCR SERPLBLD CKD-EPI 2021: 97.6 ML/MIN/1.73
EOSINOPHIL # BLD AUTO: 0 10*3/MM3 (ref 0–0.4)
EOSINOPHIL NFR BLD AUTO: 0 % (ref 0.3–6.2)
ERYTHROCYTE [DISTWIDTH] IN BLOOD BY AUTOMATED COUNT: 13.2 % (ref 12.3–15.4)
FENTANYL UR-MCNC: NEGATIVE NG/ML
FOLATE SERPL-MCNC: 7.96 NG/ML (ref 4.78–24.2)
GAS FLOW AIRWAY: 2 LPM
GLUCOSE BLDC GLUCOMTR-MCNC: 142 MG/DL (ref 70–130)
GLUCOSE BLDC GLUCOMTR-MCNC: 165 MG/DL (ref 70–130)
GLUCOSE BLDC GLUCOMTR-MCNC: 170 MG/DL (ref 70–130)
GLUCOSE BLDC GLUCOMTR-MCNC: 238 MG/DL (ref 70–130)
GLUCOSE SERPL-MCNC: 200 MG/DL (ref 65–99)
HCO3 BLDA-SCNC: 32.5 MMOL/L (ref 20–26)
HCT VFR BLD AUTO: 39.8 % (ref 34–46.6)
HCT VFR BLD CALC: 37.9 % (ref 38–51)
HGB BLD-MCNC: 12.8 G/DL (ref 12–15.9)
HGB BLDA-MCNC: 12.3 G/DL (ref 13.5–17.5)
IMM GRANULOCYTES # BLD AUTO: 0.09 10*3/MM3 (ref 0–0.05)
IMM GRANULOCYTES NFR BLD AUTO: 0.5 % (ref 0–0.5)
INHALED O2 CONCENTRATION: 28 %
LEFT ATRIUM VOLUME INDEX: 18.8 ML/M2
LYMPHOCYTES # BLD AUTO: 2.71 10*3/MM3 (ref 0.7–3.1)
LYMPHOCYTES NFR BLD AUTO: 15.8 % (ref 19.6–45.3)
Lab: ABNORMAL
MAGNESIUM SERPL-MCNC: 1.6 MG/DL (ref 1.6–2.4)
MAGNESIUM SERPL-MCNC: 2 MG/DL (ref 1.6–2.4)
MCH RBC QN AUTO: 30.3 PG (ref 26.6–33)
MCHC RBC AUTO-ENTMCNC: 32.2 G/DL (ref 31.5–35.7)
MCV RBC AUTO: 94.1 FL (ref 79–97)
METHADONE UR QL SCN: NEGATIVE
METHGB BLD QL: 0.2 % (ref 0–3)
MODALITY: ABNORMAL
MONOCYTES # BLD AUTO: 1.48 10*3/MM3 (ref 0.1–0.9)
MONOCYTES NFR BLD AUTO: 8.6 % (ref 5–12)
NEUTROPHILS NFR BLD AUTO: 12.86 10*3/MM3 (ref 1.7–7)
NEUTROPHILS NFR BLD AUTO: 75 % (ref 42.7–76)
NRBC BLD AUTO-RTO: 0 /100 WBC (ref 0–0.2)
OPIATES UR QL: NEGATIVE
OXYCODONE UR QL SCN: NEGATIVE
OXYHGB MFR BLDV: 95.7 % (ref 94–99)
PCO2 BLDA: 43.5 MM HG (ref 35–45)
PCO2 TEMP ADJ BLD: ABNORMAL MM[HG]
PCP UR QL SCN: NEGATIVE
PH BLDA: 7.48 PH UNITS (ref 7.35–7.45)
PH, TEMP CORRECTED: ABNORMAL
PHOSPHATE SERPL-MCNC: 6.1 MG/DL (ref 2.5–4.5)
PLATELET # BLD AUTO: 262 10*3/MM3 (ref 140–450)
PMV BLD AUTO: 10 FL (ref 6–12)
PO2 BLDA: 88.7 MM HG (ref 83–108)
PO2 TEMP ADJ BLD: ABNORMAL MM[HG]
POTASSIUM SERPL-SCNC: 3 MMOL/L (ref 3.5–5.2)
POTASSIUM SERPL-SCNC: 3.7 MMOL/L (ref 3.5–5.2)
RBC # BLD AUTO: 4.23 10*6/MM3 (ref 3.77–5.28)
SAO2 % BLDCOA: 97.6 % (ref 94–99)
SODIUM SERPL-SCNC: 140 MMOL/L (ref 136–145)
TRICYCLICS UR QL SCN: NEGATIVE
VENTILATOR MODE: ABNORMAL
VIT B12 BLD-MCNC: 298 PG/ML (ref 211–946)
WBC NRBC COR # BLD AUTO: 17.16 10*3/MM3 (ref 3.4–10.8)

## 2024-12-03 PROCEDURE — 25010000002 MAGNESIUM SULFATE 4 GM/100ML SOLUTION

## 2024-12-03 PROCEDURE — 93005 ELECTROCARDIOGRAM TRACING: CPT | Performed by: HOSPITALIST

## 2024-12-03 PROCEDURE — 70496 CT ANGIOGRAPHY HEAD: CPT | Performed by: RADIOLOGY

## 2024-12-03 PROCEDURE — 25810000003 SODIUM CHLORIDE 0.9 % SOLUTION: Performed by: HOSPITALIST

## 2024-12-03 PROCEDURE — 93010 ELECTROCARDIOGRAM REPORT: CPT | Performed by: INTERNAL MEDICINE

## 2024-12-03 PROCEDURE — 70498 CT ANGIOGRAPHY NECK: CPT

## 2024-12-03 PROCEDURE — 84100 ASSAY OF PHOSPHORUS: CPT | Performed by: HOSPITALIST

## 2024-12-03 PROCEDURE — 36600 WITHDRAWAL OF ARTERIAL BLOOD: CPT

## 2024-12-03 PROCEDURE — 82607 VITAMIN B-12: CPT | Performed by: HOSPITALIST

## 2024-12-03 PROCEDURE — 94664 DEMO&/EVAL PT USE INHALER: CPT

## 2024-12-03 PROCEDURE — 25010000002 CALCIUM GLUCONATE 2-0.675 GM/100ML-% SOLUTION

## 2024-12-03 PROCEDURE — 83735 ASSAY OF MAGNESIUM: CPT | Performed by: HOSPITALIST

## 2024-12-03 PROCEDURE — 70544 MR ANGIOGRAPHY HEAD W/O DYE: CPT | Performed by: RADIOLOGY

## 2024-12-03 PROCEDURE — 80307 DRUG TEST PRSMV CHEM ANLYZR: CPT | Performed by: HOSPITALIST

## 2024-12-03 PROCEDURE — 82746 ASSAY OF FOLIC ACID SERUM: CPT | Performed by: HOSPITALIST

## 2024-12-03 PROCEDURE — 93306 TTE W/DOPPLER COMPLETE: CPT

## 2024-12-03 PROCEDURE — 82375 ASSAY CARBOXYHB QUANT: CPT

## 2024-12-03 PROCEDURE — 70496 CT ANGIOGRAPHY HEAD: CPT

## 2024-12-03 PROCEDURE — 84132 ASSAY OF SERUM POTASSIUM: CPT | Performed by: HOSPITALIST

## 2024-12-03 PROCEDURE — 93306 TTE W/DOPPLER COMPLETE: CPT | Performed by: SPECIALIST

## 2024-12-03 PROCEDURE — 94799 UNLISTED PULMONARY SVC/PX: CPT

## 2024-12-03 PROCEDURE — 82948 REAGENT STRIP/BLOOD GLUCOSE: CPT

## 2024-12-03 PROCEDURE — 97162 PT EVAL MOD COMPLEX 30 MIN: CPT

## 2024-12-03 PROCEDURE — 80048 BASIC METABOLIC PNL TOTAL CA: CPT | Performed by: STUDENT IN AN ORGANIZED HEALTH CARE EDUCATION/TRAINING PROGRAM

## 2024-12-03 PROCEDURE — 83050 HGB METHEMOGLOBIN QUAN: CPT

## 2024-12-03 PROCEDURE — 99222 1ST HOSP IP/OBS MODERATE 55: CPT | Performed by: NURSE PRACTITIONER

## 2024-12-03 PROCEDURE — 25510000001 IOPAMIDOL PER 1 ML: Performed by: HOSPITALIST

## 2024-12-03 PROCEDURE — 99232 SBSQ HOSP IP/OBS MODERATE 35: CPT | Performed by: HOSPITALIST

## 2024-12-03 PROCEDURE — 36415 COLL VENOUS BLD VENIPUNCTURE: CPT | Performed by: STUDENT IN AN ORGANIZED HEALTH CARE EDUCATION/TRAINING PROGRAM

## 2024-12-03 PROCEDURE — 70498 CT ANGIOGRAPHY NECK: CPT | Performed by: RADIOLOGY

## 2024-12-03 PROCEDURE — 82805 BLOOD GASES W/O2 SATURATION: CPT

## 2024-12-03 PROCEDURE — 85025 COMPLETE CBC W/AUTO DIFF WBC: CPT | Performed by: HOSPITALIST

## 2024-12-03 PROCEDURE — 70544 MR ANGIOGRAPHY HEAD W/O DYE: CPT

## 2024-12-03 RX ORDER — ROSUVASTATIN CALCIUM 20 MG/1
40 TABLET, COATED ORAL DAILY
Status: DISCONTINUED | OUTPATIENT
Start: 2024-12-03 | End: 2024-12-05 | Stop reason: HOSPADM

## 2024-12-03 RX ORDER — LISINOPRIL 10 MG/1
20 TABLET ORAL 2 TIMES DAILY
Status: DISCONTINUED | OUTPATIENT
Start: 2024-12-03 | End: 2024-12-05 | Stop reason: HOSPADM

## 2024-12-03 RX ORDER — CALCIUM GLUCONATE 20 MG/ML
2000 INJECTION, SOLUTION INTRAVENOUS
Status: COMPLETED | OUTPATIENT
Start: 2024-12-03 | End: 2024-12-03

## 2024-12-03 RX ORDER — POTASSIUM CHLORIDE 1500 MG/1
40 TABLET, EXTENDED RELEASE ORAL EVERY 4 HOURS
Status: COMPLETED | OUTPATIENT
Start: 2024-12-03 | End: 2024-12-03

## 2024-12-03 RX ORDER — ALBUTEROL SULFATE 0.83 MG/ML
1.25 SOLUTION RESPIRATORY (INHALATION) EVERY 4 HOURS PRN
Status: DISCONTINUED | OUTPATIENT
Start: 2024-12-03 | End: 2024-12-05 | Stop reason: HOSPADM

## 2024-12-03 RX ORDER — METOPROLOL TARTRATE 25 MG/1
25 TABLET, FILM COATED ORAL EVERY 12 HOURS SCHEDULED
Status: DISCONTINUED | OUTPATIENT
Start: 2024-12-03 | End: 2024-12-05 | Stop reason: HOSPADM

## 2024-12-03 RX ORDER — IOPAMIDOL 755 MG/ML
100 INJECTION, SOLUTION INTRAVASCULAR
Status: COMPLETED | OUTPATIENT
Start: 2024-12-03 | End: 2024-12-03

## 2024-12-03 RX ORDER — MAGNESIUM SULFATE HEPTAHYDRATE 40 MG/ML
4 INJECTION, SOLUTION INTRAVENOUS ONCE
Status: COMPLETED | OUTPATIENT
Start: 2024-12-03 | End: 2024-12-03

## 2024-12-03 RX ORDER — SERTRALINE HYDROCHLORIDE 25 MG/1
25 TABLET, FILM COATED ORAL DAILY
Status: DISCONTINUED | OUTPATIENT
Start: 2024-12-03 | End: 2024-12-04

## 2024-12-03 RX ORDER — PANTOPRAZOLE SODIUM 40 MG/1
40 TABLET, DELAYED RELEASE ORAL
Status: DISCONTINUED | OUTPATIENT
Start: 2024-12-04 | End: 2024-12-05 | Stop reason: HOSPADM

## 2024-12-03 RX ORDER — CARVEDILOL 25 MG/1
25 TABLET ORAL 2 TIMES DAILY WITH MEALS
Status: DISCONTINUED | OUTPATIENT
Start: 2024-12-03 | End: 2024-12-03

## 2024-12-03 RX ORDER — DOXYCYCLINE 100 MG/1
100 CAPSULE ORAL EVERY 12 HOURS
COMMUNITY
End: 2024-12-05 | Stop reason: HOSPADM

## 2024-12-03 RX ORDER — PREDNISONE 20 MG/1
40 TABLET ORAL DAILY
COMMUNITY
End: 2024-12-05 | Stop reason: HOSPADM

## 2024-12-03 RX ORDER — SODIUM CHLORIDE 9 MG/ML
100 INJECTION, SOLUTION INTRAVENOUS CONTINUOUS
Status: DISCONTINUED | OUTPATIENT
Start: 2024-12-03 | End: 2024-12-04

## 2024-12-03 RX ORDER — ISOSORBIDE MONONITRATE 60 MG/1
60 TABLET, EXTENDED RELEASE ORAL EVERY MORNING
Status: DISCONTINUED | OUTPATIENT
Start: 2024-12-04 | End: 2024-12-05 | Stop reason: HOSPADM

## 2024-12-03 RX ORDER — AMLODIPINE BESYLATE 5 MG/1
5 TABLET ORAL DAILY
Status: DISCONTINUED | OUTPATIENT
Start: 2024-12-04 | End: 2024-12-05 | Stop reason: HOSPADM

## 2024-12-03 RX ORDER — LEVALBUTEROL INHALATION SOLUTION 0.63 MG/3ML
1 SOLUTION RESPIRATORY (INHALATION) EVERY 4 HOURS PRN
COMMUNITY

## 2024-12-03 RX ADMIN — ARFORMOTEROL TARTRATE 15 MCG: 15 SOLUTION RESPIRATORY (INHALATION) at 06:44

## 2024-12-03 RX ADMIN — BUDESONIDE 0.5 MG: 0.5 SUSPENSION RESPIRATORY (INHALATION) at 06:44

## 2024-12-03 RX ADMIN — POTASSIUM CHLORIDE 40 MEQ: 1500 TABLET, EXTENDED RELEASE ORAL at 02:25

## 2024-12-03 RX ADMIN — IPRATROPIUM BROMIDE 0.5 MG: 0.5 SOLUTION RESPIRATORY (INHALATION) at 18:23

## 2024-12-03 RX ADMIN — BUDESONIDE 0.5 MG: 0.5 SUSPENSION RESPIRATORY (INHALATION) at 18:23

## 2024-12-03 RX ADMIN — ROSUVASTATIN 40 MG: 20 TABLET, FILM COATED ORAL at 16:38

## 2024-12-03 RX ADMIN — LISINOPRIL 20 MG: 10 TABLET ORAL at 20:27

## 2024-12-03 RX ADMIN — IOPAMIDOL 70 ML: 755 INJECTION, SOLUTION INTRAVENOUS at 09:28

## 2024-12-03 RX ADMIN — POTASSIUM CHLORIDE 40 MEQ: 1500 TABLET, EXTENDED RELEASE ORAL at 06:15

## 2024-12-03 RX ADMIN — METOPROLOL TARTRATE 25 MG: 25 TABLET, FILM COATED ORAL at 20:27

## 2024-12-03 RX ADMIN — IPRATROPIUM BROMIDE 0.5 MG: 0.5 SOLUTION RESPIRATORY (INHALATION) at 06:44

## 2024-12-03 RX ADMIN — POTASSIUM CHLORIDE 40 MEQ: 1500 TABLET, EXTENDED RELEASE ORAL at 12:06

## 2024-12-03 RX ADMIN — SENNOSIDES AND DOCUSATE SODIUM 2 TABLET: 50; 8.6 TABLET ORAL at 08:23

## 2024-12-03 RX ADMIN — MAGNESIUM SULFATE HEPTAHYDRATE 4 G: 40 INJECTION, SOLUTION INTRAVENOUS at 02:46

## 2024-12-03 RX ADMIN — SERTRALINE HYDROCHLORIDE 25 MG: 25 TABLET ORAL at 16:38

## 2024-12-03 RX ADMIN — Medication 10 ML: at 10:42

## 2024-12-03 RX ADMIN — APIXABAN 5 MG: 5 TABLET, FILM COATED ORAL at 20:27

## 2024-12-03 RX ADMIN — ARFORMOTEROL TARTRATE 15 MCG: 15 SOLUTION RESPIRATORY (INHALATION) at 18:23

## 2024-12-03 RX ADMIN — IPRATROPIUM BROMIDE 0.5 MG: 0.5 SOLUTION RESPIRATORY (INHALATION) at 12:31

## 2024-12-03 RX ADMIN — METOPROLOL TARTRATE 25 MG: 25 TABLET, FILM COATED ORAL at 15:15

## 2024-12-03 RX ADMIN — SODIUM CHLORIDE 100 ML/HR: 9 INJECTION, SOLUTION INTRAVENOUS at 16:38

## 2024-12-03 RX ADMIN — APIXABAN 5 MG: 5 TABLET, FILM COATED ORAL at 08:24

## 2024-12-03 RX ADMIN — Medication 10 ML: at 20:28

## 2024-12-03 RX ADMIN — CALCIUM GLUCONATE 2000 MG: 20 INJECTION, SOLUTION INTRAVENOUS at 02:46

## 2024-12-03 RX ADMIN — ASPIRIN 81 MG: 81 TABLET, CHEWABLE ORAL at 08:24

## 2024-12-03 NOTE — CASE MANAGEMENT/SOCIAL WORK
Discharge Planning Assessment  [facility]     Patient Name: Leisa Vasquez  MRN: [id number]  Today's Date: 12/3/2024    Admit Date: 12/2/2024    Plan: SS received consult per nsg for discharge planning.  SS spoke with pt at bedside.  Pt resides at home at [address] [place] [address] with significant other Nagi Molina and plans to return home at discharge.  Pt currently does not utilize home health services.  Pt has home 02 2 1/2 liters nc, cane, rolling walker and nebulizer.  Pt unable to recall DME provider.  SS contacted [facility], [place], [employer] and [employer] and pt is not a current pt of these agencies.  Pt's PCP is Leisa Balderas.  Pt utilizes [facility].  Pt stated no POA or Living Will.  Pt transports via private auto per family.  SS will follow.   Discharge Needs Assessment       Row Name 12/03/24 1139       Living Environment    People in Home significant other    Name(s) of People in Home Lives with sigificant other Nagi Molina    Current Living Arrangements home    Potentially Unsafe Housing Conditions none    In the past 12 months has the electric, gas, oil, or water company threatened to shut off services in your home? No    Primary Care Provided by self    Provides Primary Care For no one    Family Caregiver if Needed significant other    Quality of Family Relationships helpful;involved;supportive    Able to Return to Prior Arrangements yes       Resource/Environmental Concerns    Resource/Environmental Concerns none       Transition Planning    Patient/Family Anticipates Transition to home with family    Patient/Family Anticipated Services at Transition     Transportation Anticipated family or friend will provide       Discharge Needs Assessment    Equipment Currently Used at Home oxygen;cane, straight;walker, rolling;nebulizer    Concerns to be Addressed discharge planning                   Discharge Plan       Row Name 12/03/24 9509       Plan    Plan MANDA  received consult per nsg for discharge planning.  SS spoke with pt at bedside.  Pt resides at home at [address] [place] [address] with significant other Nagi Molina and plans to return home at discharge.  Pt currently does not utilize home health services.  Pt has home 02 2 1/2 liters nc, cane, rolling walker and nebulizer.  Pt unable to recall DME provider.  SS contacted [facility], [employer] [place] and [facility] and pt is not a current pt of these agencies.  Pt's PCP is Leisa Balderas.  Pt utilizes [facility].  Pt stated no POA or Living Will.  Pt transports via private auto per family.  SS will follow.                  Continued Care and Services - Admitted Since 12/2/2024    No active coordination exists for this encounter.       Expected Discharge Date and Time       Expected Discharge Date Expected Discharge Time    Dec 5, 2024            Demographic Summary       Row Name 12/03/24 1132       General Information    Admission Type inpatient    Arrived From home    Referral Source nursing    Reason for Consult discharge planning    Preferred Language English                    Sarah Hollingsworth, DAVIDAW

## 2024-12-03 NOTE — THERAPY EVALUATION
Acute Care - Physical Therapy Initial Evaluation   [facility]     Patient Name: Leisa Vasquez  : 1955  MRN: [id number]  Today's Date: 12/3/2024      Visit Dx:     ICD-10-CM ICD-9-CM   1. Metabolic encephalopathy  G93.41 348.31   2. Hypomagnesemia  E83.42 275.2   3. Hypokalemia  E87.6 276.8   4. Hypocalcemia  E83.51 275.41     Patient Active Problem List   Diagnosis    Pulmonary embolism    Essential hypertension    Hyperlipidemia    Coronary artery disease s/p statnting int he past    COPD (chronic obstructive pulmonary disease)    T2DM (type 2 diabetes mellitus)    Obesity (BMI 30-39.9)    Leukocytosis    Acute respiratory failure with hypoxia    Macrocytosis    Osteoarthritis    Tobacco abuse    Degenerative changes mid cervical spine with mild spinal stenosis C5-6 and C6-7    Vertigo     Past Medical History:   Diagnosis Date    Arthritis     COPD (chronic obstructive pulmonary disease)     Coronary artery disease s/p statnting int he past 2020    Diabetes     Diabetes mellitus     Essential hypertension 2020    Heart & renal disease, hypertensive malignant with heart failure     Heart attack     History of bronchitis     Hyperlipidemia 2020    Low back pain     Obesity (BMI 30-39.9) 2020    Pneumonia     Pulmonary embolism      Past Surgical History:   Procedure Laterality Date    CHOLECYSTECTOMY      CORONARY STENT PLACEMENT  2013    3.0x23mm xieuce mid RCA done at ; LAD 2.51omx59ce Ref #[id number] done at [facility] 12    HYSTERECTOMY       PT Assessment (Last 12 Hours)       PT Evaluation and Treatment       Row Name 24 1458          Physical Therapy Time and Intention    Subjective Information complains of;weakness;fatigue;dizziness  -KM     Document Type evaluation  -KM     Mode of Treatment individual therapy;physical therapy  -KM     Patient Effort good  -KM     Symptoms Noted During/After Treatment dizziness;fatigue;shortness of breath  -KM        Row Name 12/03/24 1458          General Information    Patient Profile Reviewed yes  -KM     Patient Observations alert;cooperative;agree to therapy  -KM     Prior Level of Function independent:;all household mobility;ADL's  -KM     Existing Precautions/Restrictions fall  -KM     Risks Reviewed patient:;LOB;nausea/vomiting;dizziness;increased discomfort  -KM     Benefits Reviewed patient:;improve function;increase independence;increase strength;increase balance  -KM     Barriers to Rehab none identified  -KM       Row Name 12/03/24 1458          Living Environment    Current Living Arrangements home  -KM     People in Home significant other  -KM     Primary Care Provided by self  -KM       Row Name 12/03/24 1458          Home Use of Assistive/Adaptive Equipment    Equipment Currently Used at Home cane, straight;walker, rolling  -KM       Row Name 12/03/24 1458          Cognition    Affect/Mental Status (Cognition) WFL  -     Orientation Status (Cognition) oriented x 3  -KM     Follows Commands (Cognition) [facility]  -       Row Name 12/03/24 1458          Range of Motion (ROM)    Range of Motion bilateral lower extremities;ROM is [facility] Name 12/03/24 1458          Strength (Manual Muscle Testing)    Strength (Manual Muscle Testing) bilateral lower extremities;strength is [facility]  -       Row Name 12/03/24 1458          Bed Mobility    Bed Mobility supine-sit  -KM     Supine-Sit [place] (Bed Mobility) minimum assist (75% patient effort);moderate assist (50% patient effort)  -KM     Assistive Device (Bed Mobility) bed rails;head of bed elevated  -       Row Name 12/03/24 1458          Transfers    Transfers sit-stand transfer;stand-sit transfer  -       Row Name 12/03/24 1458          Sit-Stand Transfer    Sit-Stand [place] (Transfers) minimum assist (75% patient effort);moderate assist (50% patient effort)  -KM     Assistive Device (Sit-Stand Transfers) --  HHA; bed rail  -       Row Name  12/03/24 1458          Stand-Sit Transfer    Stand-Sit [place] (Transfers) minimum assist (75% patient effort)  -KM     Assistive Device (Stand-Sit Transfers) --  HHA; bed rail  -KM       Row Name 12/03/24 1458          Gait/Stairs (Locomotion)    Gait/Stairs Locomotion gait/ambulation independence;gait/ambulation assistive device;distance ambulated  -KM     [place] Level (Gait) minimum assist (75% patient effort)  -KM     Assistive Device (Gait) --  BUE HHA  -KM     Patient was able to Ambulate yes  -KM     Distance in Feet (Gait) 3  L/R  -KM     Pattern (Gait) step-to  -KM     Deviations/Abnormal Patterns (Gait) ataxic;base of support, narrow;gait speed decreased  -KM     Bilateral Gait Deviations forward flexed posture  -KM       Row Name 12/03/24 1458          Safety Issues/Impairments Affecting Functional Mobility    Impairments Affecting Function (Mobility) balance;endurance/activity tolerance;strength  -KM       Row Name 12/03/24 1458          Balance    Balance Assessment sitting static balance;standing dynamic balance  -KM     Static Sitting Balance standby assist  -KM     Position, Sitting Balance sitting edge of bed  -KM     Dynamic Standing Balance minimal assist  -KM     Position/Device Used, Standing Balance --  BUE HHA  -KM       Row Name 12/03/24 1458          Plan of Care Review    Plan of Care Reviewed With patient;significant other  -KM     Outcome Evaluation Pt. evaluation completed during PT session. She was able to perform functional mobility skills w/ Codey-modA. She was able to ambulate minimal distance w/ Codey for steadying. Pt. c/o dizziness w/ sudden movement and poor balance in standing. Pt. would benefit from increased PT services to address impaired balance, mobility, safety precautions. and fall risk.  -       Row Name 12/03/24 1458          Therapy Assessment/Plan (PT)    Patient/Family Therapy Goals Statement (PT) return to PLOF  -KM     Functional Level at Time of  Evaluation (PT) Codey-modA  -KM     PT Diagnosis (PT) decreased balance and mobility  -KM     Rehab Potential (PT) good  -KM     Criteria for Skilled Interventions Met (PT) yes;skilled treatment is necessary  -     Therapy Frequency (PT) 3 times/wk  3-5x/wk  -KM     Predicted Duration of Therapy Intervention (PT) until discharge  -     Problem List (PT) problems related to;balance;mobility;strength  -KM     Activity Limitations Related to Problem List (PT) unable to ambulate safely;unable to transfer safely  -       Row Name 12/03/24 1458          Therapy Plan Review/Discharge Plan (PT)    Therapy Plan Review (PT) evaluation/treatment results reviewed;care plan/treatment goals reviewed;risks/benefits reviewed;patient;spouse/significant other  -       Row Name 12/03/24 1450          Physical Therapy Goals    Bed Mobility Goal Selection (PT) bed mobility, PT goal 1  -KM     Transfer Goal Selection (PT) transfer, PT goal 1  -KM     Gait Training Goal Selection (PT) gait training, PT goal 1  -KM       Row Name 12/03/24 1451          Bed Mobility Goal 1 (PT)    Activity/Assistive Device (Bed Mobility Goal 1, PT) bed mobility activities, all  -KM     [place] Level/Cues Needed (Bed Mobility Goal 1, PT) supervision required  -KM     Time Frame (Bed Mobility Goal 1, PT) by discharge  -       Row Name 12/03/24 145          Transfer Goal 1 (PT)    Activity/Assistive Device (Transfer Goal 1, PT) transfers, all;walker, rolling  -KM     [place] Level/Cues Needed (Transfer Goal 1, PT) supervision required  -KM     Time Frame (Transfer Goal 1, PT) by discharge  -       Row Name 12/03/24 1450          Gait Training Goal 1 (PT)    Activity/Assistive Device (Gait Training Goal 1, PT) gait (walking locomotion);assistive device use;walker, rolling  -KM     [place] Level (Gait Training Goal 1, PT) supervision required  -KM     Distance (Gait Training Goal 1, PT) 100'  -KM     Time Frame (Gait Training Goal 1,  PT) by discharge  -               User Key  (r) = Recorded By, (t) = Taken By, (c) = Cosigned By      Initials Name Provider Type    Cesar Gil PT Physical Therapist                    Physical Therapy Education       Title: PT OT SLP Therapies (Done)       Topic: Physical Therapy (Done)       Point: Mobility training (Done)       Learning Progress Summary            Patient Acceptance, E,TB, VU by  at 12/3/2024 1509                      Point: Home exercise program (Done)       Learning Progress Summary            Patient Acceptance, E,TB, VU by  at 12/3/2024 1509                      Point: Body mechanics (Done)       Learning Progress Summary            Patient Acceptance, E,TB, VU by  at 12/3/2024 1509                      Point: Precautions (Done)       Learning Progress Summary            Patient Acceptance, E,TB, VU by  at 12/3/2024 1509                                      User Key       Initials Effective Dates Name Provider Type Discipline     05/24/22 -  Cesar Thomason, PT Physical Therapist PT                  PT Recommendation and Plan  Anticipated Discharge Disposition (PT): inpatient rehabilitation facility  Planned Therapy Interventions (PT): balance training, bed mobility training, gait training, home exercise program, patient/family education, postural re-education, ROM (range of motion), stair training, strengthening, stretching, transfer training  Therapy Frequency (PT): 3 times/wk (3-5x/wk)  Plan of Care Reviewed With: patient, significant other  Outcome Evaluation: Pt. evaluation completed during PT session. She was able to perform functional mobility skills w/ Codey-modA. She was able to ambulate minimal distance w/ Codey for steadying. Pt. c/o dizziness w/ sudden movement and poor balance in standing. Pt. would benefit from increased PT services to address impaired balance, mobility, safety precautions. and fall risk.       Time Calculation:    PT Charges       Row Name  12/03/24 1457             Time Calculation    PT Received On 12/03/24  -NISHANT      PT Goal Re-Cert Due Date 12/17/24  -KM                User Key  (r) = Recorded By, (t) = Taken By, (c) = Cosigned By      Initials Name Provider Type    Cesar Gil, PT Physical Therapist                  Therapy Charges for Today       Code Description Service Date Service Provider Modifiers Qty    33623074970 HC PT EVAL MOD COMPLEXITY 4 12/3/2024 Cesar Thomason, PT GP 1            PT G-Codes  AM-PAC 6 Clicks Score (PT): 20    Cesar Thomason, PT  12/3/2024

## 2024-12-03 NOTE — PLAN OF CARE
Goal Outcome Evaluation:  Patient is a new admit from ED this shift. A&Ox1. Disoriented to place, time, and situation. IV access maintained and infusing with no visible complications at this time. Electrolytes replaced per protocol. VSS. No visible s/s of acute distress noted at this time. No requests or complaints at this time. Plan of care ongoing.

## 2024-12-03 NOTE — PLAN OF CARE
Goal Outcome Evaluation:    Pt is resting in bed with no s/s of distress noted. VSS. 2 L NC with an O2 of 92% or above. IV access maintained. Fluids infusing per order. Pt begin to have visual hallucinations with mild confusion this afternoon, MD Du notified, see orders. Pt ambulated this shift, tolerated well. Will continue with plan of care.

## 2024-12-03 NOTE — PROGRESS NOTES
[facility] PROGRESS NOTE     Patient Identification:  Name:  Leisa Vasquez  Age:  68 y.o.  Sex:  female  :  1955  MRN:  [id number]  Visit Number:  [id number]  Primary Care Provider:  Leisa Balderas MD    Length of stay:  1    Subjective: Patient seen and examined, she is much better, able to communicate with good attention span, follows commands, she is now able to move her eyes without dizziness or nausea.  She was able to eat.  Electrolytes still abnormal.  Review of telemetry patient has episode of RVR.    Chief Complaint: Dizziness  ----------------------------------------------------------------------------------------------------------------------  [facility] Meds:  amLODIPine, 10 mg, Oral, Daily  apixaban, 5 mg, Oral, Q12H  arformoterol, 15 mcg, Nebulization, BID - RT  aspirin, 81 mg, Oral, Daily  budesonide, 0.5 mg, Nebulization, BID - RT  insulin lispro, 2-7 Units, Subcutaneous, 4x Daily PC & at Bedtime  ipratropium, 0.5 mg, Nebulization, 4x Daily - RT  nicotine, 1 patch, Transdermal, Q24H  senna-docusate sodium, 2 tablet, Oral, BID  sodium chloride, 10 mL, Intravenous, Q12H         ----------------------------------------------------------------------------------------------------------------------  Vital Signs:  Temp:  [96.2 °F (35.7 °C)-98.1 °F (36.7 °C)] 98.1 °F (36.7 °C)  Heart Rate:  [] 93  Resp:  [18-35] 18  BP: (127-165)/(53-99) 151/82       Tele: Atrial fibrillation rate of 90 bpm      24  1351 24  1928 24  0500   Weight: 109 kg (240 lb 4.8 oz) 101 kg (223 lb 5.2 oz) 101 kg (223 lb 5.2 oz) (new admit less than 24 hours)     Body mass index is 34.98 kg/m².    Intake/Output Summary (Last 24 hours) at 12/3/2024 1326  Last data filed at 12/3/2024 0910  Gross per 24 hour   Intake 480 ml   Output 400 ml   Net 80 ml     Diet: Diabetic; Consistent Carbohydrate; Fluid Consistency: Thin (IDDSI  0)  ----------------------------------------------------------------------------------------------------------------------  Physical exam:  General: Comfortable,awake, alert, oriented to self, place, and time, well-developed and well-nourished.  No respiratory distress.    Skin:  Skin is warm and dry. No rash noted. No pallor.    HENT: No nystagmus head:  Normocephalic and atraumatic.  Mouth:  Moist mucous membranes.    Eyes:  Conjunctivae and EOM are normal.  Pupils are equal, round, and reactive to light.  No scleral icterus.    Neck:  Neck supple.  No JVD present.    Pulmonary/Chest:  No respiratory distress, no wheezes, no crackles, with normal breath sounds and good air movement.  Cardiovascular:  Normal rate, regular rhythm and normal heart sounds with no murmur.  Abdominal:  Soft.  Bowel sounds are normal.  No distension and no tenderness.   Extremities:  No edema, no tenderness, and no deformity.  No red or swollen joints anywhere.  Strong pulses in all 4 extremities with no clubbing, no cyanosis, no edema.  Neurological:  Motor strength equal no obvious deficit, sensory grossly intact.   No cranial nerve deficit.  No tongue deviation.  No facial droop.  No slurred speech.  No dysmetria, no dysdiadochokinesis, finger-to-nose is normal  Genitourinary: External urinary catheter  Back:  ----------------------------------------------------------------------------------------------------------------------  ----------------------------------------------------------------------------------------------------------------------  Results from last 7 days   Lab Units 12/02/24 1838 12/02/24  1502   HSTROP T ng/L 20* 19*     Results from last 7 days   Lab Units 12/03/24  0029 12/02/24 1838 12/02/24  1342   LACTATE mmol/L  --  1.5 2.6*   WBC 10*3/mm3 17.16*  --  17.06*   HEMOGLOBIN g/dL 12.8  --  14.6   HEMATOCRIT % 39.8  --  45.2   MCV fL 94.1  --  92.2   MCHC g/dL 32.2  --  32.3   PLATELETS 10*3/mm3 262  --  308   INR   " --   --  1.48*         Results from last 7 days   Lab Units 12/03/24  0029 12/02/24  1838 12/02/24  1502   SODIUM mmol/L 140 138 138   POTASSIUM mmol/L 3.0* 2.7* 2.6*   MAGNESIUM mg/dL 1.6  --  0.3*   CHLORIDE mmol/L 95* 92* 91*   CO2 mmol/L 33.8* 31.5* 29.6*   BUN mg/dL 12 13 12   CREATININE mg/dL 0.64 0.59 0.56*   CALCIUM mg/dL 7.5* 6.4* 6.6*   GLUCOSE mg/dL 200* 258* 277*   ALBUMIN g/dL  --   --  3.4*   BILIRUBIN mg/dL  --   --  1.4*   ALK PHOS U/L  --   --  54   AST (SGOT) U/L  --   --  15   ALT (SGPT) U/L  --   --  17   Estimated Creatinine Clearance: 105.7 mL/min (by C-G formula based on SCr of 0.64 mg/dL).    No results found for: \"AMMONIA\"      No results found for: \"BLOODCX\"  No results found for: \"URINECX\"  No results found for: \"WOUNDCX\"  No results found for: \"STOOLCX\"    I have personally looked at the labs and they are summarized above.  ----------------------------------------------------------------------------------------------------------------------  Imaging Results (Last 24 Hours)       Procedure Component Value Units Date/Time    MRI Angiogram Venogram Head [[id number]] Collected: 12/03/24 1035     Updated: 12/03/24 1040    Narrative:      PROCEDURE: MRI ANGIOGRAM VENOGRAM HEAD-     HISTORY: headaches, history blood clots; G93.41-Metabolic  encephalopathy; E83.42-Hypomagnesemia; E87.6-Hypokalemia;  E83.51-Hypocalcemia     PROCEDURE: MRV imaging was performed.     COMPARISON: None.     FINDINGS: The right transverse sinus is congenitally diminutive. There  is no evidence of dural venous thrombosis. The visualized cortical veins  are patent.       Impression:      Unremarkable MRV of the brain.           This report was finalized on 12/3/2024 10:38 AM by Jackie More M.D..       CT Angiogram Carotids [[id number]] Collected: 12/03/24 0941     Updated: 12/03/24 0948    Narrative:      PROCEDURE: CT ANGIOGRAM HEAD-, CT ANGIOGRAM CAROTIDS-     HISTORY: dizziness; G93.41-Metabolic " encephalopathy;  E83.42-Hypomagnesemia; E87.6-Hypokalemia; E83.51-Hypocalcemia     COMPARISON: None.     TECHNIQUE: Axial images were obtained from the skull vertex through the  upper chest following the administration of Isovue 300 intravenous  contrast. Coronal and sagittal 3D MIP images were reformatted. The  degree of stenosis was determined using the NACET criteria. This study  was performed with techniques to keep radiation doses as low as  reasonably achievable (ALARA). Individualized dose reduction techniques  using automated exposure control or adjustment of mA and/or kV according  to the patient size were employed.     FINDINGS:     CTA:     Aortic arch: There is a normal configuration to the aortic arch. There  is calcific atherosclerotic plaque at the origins of the great vessels,  however there is no significant stenosis. The subclavian arteries are  widely patent.     Right carotid: No significant atherosclerotic plaque is seen in the CCA.  There is calcific and soft plaque in the carotid bulb and proximal ICA  producing a 60% stenosis. No significant plaque is seen in the mid or  distal ICA.     Left carotid: No significant plaque is seen in the CCA. There is  calcific plaque in the left carotid bulb and proximal ICA producing a  40% stenosis. No significant plaque is seen in the mid or distal ICA.     Vertebrals: No significant stenosis is present.     The intracranial ICAs are patent with no significant stenosis. The ACAs,  MCAs and PCAs are patent. The intracranial vertebral and basilar  arteries are patent. There is no evidence of an aneurysm or vascular  malformation.     Evaluation of the soft tissues of the neck reveal no evidence of a mass  or adenopathy. In the visualized lungs note is made of a partially  imaged right pleural effusion.       Impression:      1. Calcific and soft plaque in the right carotid bulb and proximal ICA  producing a 60% stenosis.  2. Calcific plaque in the left  carotid bulb and proximal ICA producing a  40% stenosis.  3. No hemodynamically significant stenosis in the intracranial  vasculature.  4. Partially imaged right pleural effusion.                    This report was finalized on 12/3/2024 9:46 AM by Jackie More M.D..       CT Angiogram Head [[id number]] Collected: 12/03/24 0941     Updated: 12/03/24 0948    Narrative:      PROCEDURE: CT ANGIOGRAM HEAD-, CT ANGIOGRAM CAROTIDS-     HISTORY: dizziness; G93.41-Metabolic encephalopathy;  E83.42-Hypomagnesemia; E87.6-Hypokalemia; E83.51-Hypocalcemia     COMPARISON: None.     TECHNIQUE: Axial images were obtained from the skull vertex through the  upper chest following the administration of Isovue 300 intravenous  contrast. Coronal and sagittal 3D MIP images were reformatted. The  degree of stenosis was determined using the NACET criteria. This study  was performed with techniques to keep radiation doses as low as  reasonably achievable (ALARA). Individualized dose reduction techniques  using automated exposure control or adjustment of mA and/or kV according  to the patient size were employed.     FINDINGS:     CTA:     Aortic arch: There is a normal configuration to the aortic arch. There  is calcific atherosclerotic plaque at the origins of the great vessels,  however there is no significant stenosis. The subclavian arteries are  widely patent.     Right carotid: No significant atherosclerotic plaque is seen in the CCA.  There is calcific and soft plaque in the carotid bulb and proximal ICA  producing a 60% stenosis. No significant plaque is seen in the mid or  distal ICA.     Left carotid: No significant plaque is seen in the CCA. There is  calcific plaque in the left carotid bulb and proximal ICA producing a  40% stenosis. No significant plaque is seen in the mid or distal ICA.     Vertebrals: No significant stenosis is present.     The intracranial ICAs are patent with no significant stenosis. The ACAs,  MCAs  and PCAs are patent. The intracranial vertebral and basilar  arteries are patent. There is no evidence of an aneurysm or vascular  malformation.     Evaluation of the soft tissues of the neck reveal no evidence of a mass  or adenopathy. In the visualized lungs note is made of a partially  imaged right pleural effusion.       Impression:      1. Calcific and soft plaque in the right carotid bulb and proximal ICA  producing a 60% stenosis.  2. Calcific plaque in the left carotid bulb and proximal ICA producing a  40% stenosis.  3. No hemodynamically significant stenosis in the intracranial  vasculature.  4. Partially imaged right pleural effusion.                    This report was finalized on 12/3/2024 9:46 AM by Jackie More M.D..       MRI Brain Without Contrast [[id number]] Collected: 12/02/24 2154     Updated: 12/02/24 2157    Narrative:      Comparison: None available.     Patient motion artifact limits evaluation. The corpus callosum,  pituitary gland and optic chiasm are unremarkable. No hydrocephalus or  midline shift is seen. No abnormal extra-axial fluid collection is  identified. Chronic infarct right occipital lobe is noted with blooming  artifact on gradient echo sequences suggests hemosiderin deposition.  Blooming on gradient echo T2 weighted sequence also seen the posterior  left lateral ventricle, image 15 sequence 13 which may be calcification,  blood product also considered. Recommend comparison with prior imaging  or CT. Abnormal T2 signal noted within the central mony with predominant  sparing of the peripheral fibers. Abnormal T2 signal also within the  basal ganglia bilaterally, greater on the left involving the external  capsules. Although findings may be from chronic vascular disease,  osmotic demyelination syndrome also considered in the proper clinical  setting. Subcortical T2 signal are also seen within the right frontal  lobe. Typical chronic microvascular changes within the deep  white matter  at the periventricular regions. No diffusion restriction, no acute  infarction is identified.       Impression:      Impression:  1. Abnormal T2 signal as described, correlate clinically to exclude  osmotic demyelination syndrome.  2. Abnormal gradient echo hypointensity left lateral ventricle, compare  with prior imaging or further evaluation with CT recommended.     This report was finalized on 12/2/2024 9:54 PM by Hiram Bird DO.       XR Chest 1 View [[id number]] Collected: 12/02/24 1528     Updated: 12/02/24 1531    Narrative:      PROCEDURE: XR CHEST 1 VW-       HISTORY: Severe Sepsis Protocol     COMPARISON: 12/21/2020.     FINDINGS: The heart is normal in size. The mediastinum is unremarkable.  The lungs are clear. There is no pneumothorax. There are no acute  osseous abnormalities.       Impression:      No acute cardiopulmonary process.        This report was finalized on 12/2/2024 3:29 PM by Jackie More M.D..       CT Head Without Contrast [[id number]] Collected: 12/02/24 1503     Updated: 12/02/24 1506    Narrative:      PROCEDURE: CT HEAD WO CONTRAST-     HISTORY: AMS     COMPARISON: 12/22/2020.     TECHNIQUE: Multiple axial CT images were performed from the foramen  magnum to the vertex without enhancement. This study was performed with  techniques to keep radiation doses as low as reasonably achievable  (ALARA). Individualized dose reduction techniques using automated  exposure control or adjustment of mA and/or kV according to the patient  size were employed.     FINDINGS: There is no CT evidence of hemorrhage. There is no mass, mass  effect or midline shift.  There is no hydrocephalus. The paranasal  sinuses are clear. Bone windows reveal no acute osseous abnormalities.       Impression:      No acute intracranial process.              This report was finalized on 12/2/2024 3:04 PM by Jackie More M.D..       CT Abdomen Pelvis Without Contrast [[id number]] Collected:  12/02/24 1455     Updated: 12/02/24 1459    Narrative:      PROCEDURE: CT ABDOMEN PELVIS WO CONTRAST-     HISTORY: Pain w/ vomiting     COMPARISON: None .     PROCEDURE: Axial images were obtained from the lung bases through the  pubic symphysis without intravenous contrast. . This study was performed  with techniques to keep radiation doses as low as reasonably achievable  (ALARA). Individualized dose reduction techniques using automated  exposure control or adjustment of mA and/or kV according to the patient  size were employed.     FINDINGS:     ABDOMEN: There are bilateral pleural effusions and bibasilar  atelectasis. The heart size is normal. Noncontrast images of the liver  are unremarkable with no focal hepatic lesionsl. The spleen is normal.  No adrenal masses are seen.  The pancreas has an unremarkable unenhanced  appearance.. The aorta is normal in caliber. There is no significant  free fluid or adenopathy. There is no nephrolithiasis. There is no  hydronephrosis. Limited noncontrast images of the bowel are  unremarkable.     PELVIS: The appendix is not identified. There are a few scattered  colonic diverticula without evidence of diverticulitis. The patient is  status post hysterectomy. The urinary bladder is unremarkable. There is  no significant fluid or adenopathy. Bone windows reveal no acute osseous  abnormalities or lytic/destructive lesions. The extraperitoneal soft  tissues are unremarkable.       Impression:      No hydronephrosis or nephrolithiasis.              This report was finalized on 12/2/2024 2:57 PM by Jackei More M.D..             ----------------------------------------------------------------------------------------------------------------------  Assessment and Plan:  -Severe dizziness with nausea and vomiting following headache, MRI negative per neurology RCVS versus CVST  -Incidental finding of chronic right occipital stroke  -Severe electrolyte derangement  improved  -Nonobstructive carotid artery disease on CT angio  -Tobacco use disorder  -Obesity with a BMI of 35  -History of essential hypertension  -History of hyperlipidemia  -History of paroxysmal atrial fibrillation, review of chart way back to January patient has Holter and noted episodes of A-fib.  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  Decrease Norvasc to allow room for beta-blocker.  Enteric-coated aspirin and statins    Disposition pending home      Brigida Du MD  12/03/24  13:26 EST

## 2024-12-03 NOTE — CONSULTS
Neurology Consult Note    Patient Name: Leisa Vasquez   MRN: [id number]  Age: 68 y.o.  Sex: female  : 1955    Primary Care Physician: Leisa Balderas MD  Referring Physician:  No ref. provider found    Chief Complaint/Reason for Consultation: Headache/Dizziness    Subjective .  HPI: 68-year-old female PMHx significant for COPD, tobacco use, history of PE/DVT, recently diagnosed A-fib anticoagulated with Eliquis, hypertension, diabetes, and hyperlipidemia presented to the ED on 2024 complaining of AMS, headache with nausea/vomiting, and dizziness.  She tells us that over the past 2 or 3 months she has been having dizzy spells, frequent falls, running into things, and hallucinating.  Reports for the last 2 to 3 days she had severe diarrhea and reported that anytime she would move she would have a 10 out of 10 headache that would cause her to vomit.  She does feel significantly better today, does feel like she is having trouble understanding our conversation.  She is able to move her head around without difficulty today, only slightest movement caused a headache yesterday.  MRI brain showed a chronic infarct in right occipital lobe, patient denies ever being diagnosed with stroke but feels that she had Stroke-like symptoms 3 years ago and believes that is when she had the stroke.    Review of Systems   Constitutional:  Positive for activity change. Negative for chills and fever.   Respiratory: Negative.     Cardiovascular: Negative.    Skin: Negative.    Neurological:  Positive for dizziness, weakness and headaches. Negative for seizures, facial asymmetry, speech difficulty and numbness.      Past Medical History:   Diagnosis Date    Arthritis     COPD (chronic obstructive pulmonary disease)     Coronary artery disease s/p statnting int he past 2020    Diabetes     Diabetes mellitus     Essential hypertension 2020    Heart & renal disease, hypertensive malignant with heart failure     Heart  attack     History of bronchitis     Hyperlipidemia 12/22/2020    Low back pain     Obesity (BMI 30-39.9) 12/22/2020    Pneumonia     Pulmonary embolism      Past Surgical History:   Procedure Laterality Date    CHOLECYSTECTOMY      CORONARY STENT PLACEMENT  02/08/2013    3.0x23mm xieuce mid RCA done at ; LAD [id number] Ref #[id number] done at [facility] 12/29/12    HYSTERECTOMY       Family History   Problem Relation Age of Onset    Cancer Mother     No Known Problems Father      Social History     Socioeconomic History    Marital status:    Tobacco Use    Smoking status: Every Day     Current packs/day: 0.50     Average packs/day: 0.5 packs/day for 30.0 years (15.0 ttl pk-yrs)     Types: Cigarettes    Smokeless tobacco: Never   Vaping Use    Vaping status: Never Used   Substance and Sexual Activity    Alcohol use: No    Drug use: No    Sexual activity: Defer     Allergies   Allergen Reactions    Codeine Other (See Comments) and Unknown (See Comments)     Lethargic     Prior to Admission medications    Medication Sig Start Date End Date Taking? Authorizing Provider   apixaban (ELIQUIS) 5 MG tablet tablet Take 1 tablet by mouth Every 12 (Twelve) Hours. Indications: DVT/PE (active thrombosis) 12/29/20   Nnamdi Epperson MD   aspirin 81 MG chewable tablet Chew 81 mg Daily.    ProviderConor MD   carvedilol (COREG) 25 MG tablet Take 25 mg by mouth 2 (Two) Times a Day With Meals.    Conor Barreto MD   desvenlafaxine (PRISTIQ) 50 MG 24 hr tablet Take 1 tablet by mouth Daily.    Conor Barreto MD   ezetimibe (Zetia) 10 MG tablet Take 1 tablet by mouth Daily.    Conor Barreto MD   ipratropium-albuterol (DUO-NEB) 0.5-2.5 mg/3 ml nebulizer Take 3 mL by nebulization Every 6 (Six) Hours As Needed for Wheezing.    Conor Barreto MD   isosorbide mononitrate (IMDUR) 60 MG 24 hr tablet Take 1 tablet by mouth Daily.    Conor Barreto MD   lisinopril (PRINIVIL,ZESTRIL) 20 MG  tablet Take 1 tablet by mouth 2 (Two) Times a Day.    Conor Barreto MD   metFORMIN (GLUCOPHAGE) 1000 MG tablet Take 1 tablet by mouth 2 (Two) Times a Day With Meals.    Conor Barreto MD   nitroglycerin (NITROSTAT) 0.4 MG SL tablet Place 0.4 mg under the tongue Every 5 (Five) Minutes As Needed for Chest Pain. Take no more than 3 doses in 15 minutes.    Conor Barreto MD   omeprazole (priLOSEC) 20 MG capsule Take 1 capsule by mouth 2 (Two) Times a Day.    Conor Barreto MD   rosuvastatin (CRESTOR) 40 MG tablet Take 1 tablet by mouth Daily.    Conor Barreto MD   sertraline (ZOLOFT) 25 MG tablet Take 1 tablet by mouth Daily.    Conor Barreto MD             Objective     Temp:  [96.2 °F (35.7 °C)-97.9 °F (36.6 °C)] 97.7 °F (36.5 °C)  Heart Rate:  [] 57  Resp:  [18-35] 18  BP: (127-165)/(53-99) 132/70  Neurological Exam  Mental Status  Awake and alert. Oriented to person, place and time. Recent and remote memory are intact. Speech is normal. Language is fluent with no aphasia. Attention and concentration are normal.    Cranial Nerves  CN II: Left visual field cut.  CN III, IV, VI: Extraocular movements intact bilaterally. Normal lids and orbits bilaterally.  CN V: Facial sensation is normal.  CN VII: Full and symmetric facial movement.  CN IX, X: Palate elevates symmetrically  CN XI: Shoulder shrug strength is normal.  CN XII: Tongue midline without atrophy or fasciculations.    Motor  Normal muscle bulk throughout. Normal muscle tone. No abnormal involuntary movements. Strength is 5/5 throughout all four extremities.    Sensory  Light touch is normal in upper and lower extremities.     Coordination  Right: Finger-to-nose normal. Heel-to-shin normal.Left: Finger-to-nose normal. Heel-to-shin normal.    Gait    Not assessed.      Physical Exam  Vitals and nursing note reviewed.   Constitutional:       General: She is awake. She is not in acute distress.     Appearance: She  is obese.   HENT:      Head: Normocephalic.      Mouth/Throat:      Pharynx: Oropharynx is clear.   Eyes:      General: Lids are normal.      Extraocular Movements: Extraocular movements intact.   Cardiovascular:      Rate and Rhythm: Normal rate.   Pulmonary:      Effort: Pulmonary effort is normal. No respiratory distress.   Musculoskeletal:         General: Normal range of motion.      Cervical back: Normal range of motion and neck supple. No rigidity or tenderness.   Skin:     General: Skin is warm and dry.   Neurological:      General: No focal deficit present.      Mental Status: She is alert.      Motor: Motor strength is normal.  Psychiatric:         Mood and Affect: Mood normal.         Speech: Speech normal.       Hospital Meds:  Scheduled- amLODIPine, 10 mg, Oral, Daily  apixaban, 5 mg, Oral, Q12H  arformoterol, 15 mcg, Nebulization, BID - RT  aspirin, 81 mg, Oral, Daily  budesonide, 0.5 mg, Nebulization, BID - RT  insulin lispro, 2-7 Units, Subcutaneous, 4x Daily PC & at Bedtime  ipratropium, 0.5 mg, Nebulization, 4x Daily - RT  nicotine, 1 patch, Transdermal, Q24H  potassium chloride ER, 40 mEq, Oral, Q4H  senna-docusate sodium, 2 tablet, Oral, BID  sodium chloride, 10 mL, Intravenous, Q12H      Infusions-     PRNs-   senna-docusate sodium **AND** polyethylene glycol **AND** bisacodyl **AND** bisacodyl    Calcium Replacement - Follow Nurse / BPA Driven Protocol    Calcium Replacement - Follow Nurse / BPA Driven Protocol    Calcium Replacement - Follow Nurse / BPA Driven Protocol    dextrose    dextrose    glucagon (human recombinant)    Magnesium Cardiology Dose Replacement - Follow Nurse / BPA Driven Protocol    Magnesium Standard Dose Replacement - Follow Nurse / BPA Driven Protocol    Magnesium Standard Dose Replacement - Follow Nurse / BPA Driven Protocol    nitroglycerin    Phosphorus Replacement - Follow Nurse / BPA Driven Protocol    Phosphorus Replacement - Follow Nurse / BPA Driven Protocol     Potassium Replacement - Follow Nurse / BPA Driven Protocol    Potassium Replacement - Follow Nurse / BPA Driven Protocol    sodium chloride    sodium chloride    sodium chloride    Functional Status Prior to Current Stroke/[place] Score: 2    Results Reviewed:  I have personally reviewed current labs, radiology, and data.    Lab Results   Component Value Date    HGBA1C 8.00 (H) 01/28/2022     Lab Results   Component Value Date    WBC 17.16 (H) 12/03/2024    HGB 12.8 12/03/2024    HCT 39.8 12/03/2024    MCV 94.1 12/03/2024     12/03/2024      Lab Results   Component Value Date    GLUCOSE 200 (H) 12/03/2024    BUN 12 12/03/2024    CREATININE 0.64 12/03/2024    EGFRIFNONA 64 01/28/2022    BCR 18.8 12/03/2024    K 3.0 (L) 12/03/2024    CO2 33.8 (H) 12/03/2024    CALCIUM 7.5 (L) 12/03/2024    ALBUMIN 3.4 (L) 12/02/2024    AST 15 12/02/2024    ALT 17 12/02/2024      Lab Results   Component Value Date    CHOL 202 (H) 01/28/2022    CHOL 178 12/23/2020     Lab Results   Component Value Date    TRIG 112 01/28/2022    TRIG 85 12/23/2020     Lab Results   Component Value Date    HDL 43 01/28/2022    HDL 40 12/23/2020     Lab Results   Component Value Date     (H) 01/28/2022     (H) 12/23/2020      Imaging Results (Last 24 Hours)       Procedure Component Value Units Date/Time    MRI Angiogram Venogram Head [[id number]] Resulted: 12/03/24 0850     Updated: 12/03/24 0850    MRI Brain Without Contrast [[id number]] Collected: 12/02/24 2154     Updated: 12/02/24 2157    Narrative:      Comparison: None available.     Patient motion artifact limits evaluation. The corpus callosum,  pituitary gland and optic chiasm are unremarkable. No hydrocephalus or  midline shift is seen. No abnormal extra-axial fluid collection is  identified. Chronic infarct right occipital lobe is noted with blooming  artifact on gradient echo sequences suggests hemosiderin deposition.  Blooming on gradient echo T2 weighted sequence also seen  the posterior  left lateral ventricle, image 15 sequence 13 which may be calcification,  blood product also considered. Recommend comparison with prior imaging  or CT. Abnormal T2 signal noted within the central mony with predominant  sparing of the peripheral fibers. Abnormal T2 signal also within the  basal ganglia bilaterally, greater on the left involving the external  capsules. Although findings may be from chronic vascular disease,  osmotic demyelination syndrome also considered in the proper clinical  setting. Subcortical T2 signal are also seen within the right frontal  lobe. Typical chronic microvascular changes within the deep white matter  at the periventricular regions. No diffusion restriction, no acute  infarction is identified.       Impression:      Impression:  1. Abnormal T2 signal as described, correlate clinically to exclude  osmotic demyelination syndrome.  2. Abnormal gradient echo hypointensity left lateral ventricle, compare  with prior imaging or further evaluation with CT recommended.     This report was finalized on 12/2/2024 9:54 PM by Hiram Bird DO.       XR Chest 1 View [[id number]] Collected: 12/02/24 1528     Updated: 12/02/24 1531    Narrative:      PROCEDURE: XR CHEST 1 VW-       HISTORY: Severe Sepsis Protocol     COMPARISON: 12/21/2020.     FINDINGS: The heart is normal in size. The mediastinum is unremarkable.  The lungs are clear. There is no pneumothorax. There are no acute  osseous abnormalities.       Impression:      No acute cardiopulmonary process.        This report was finalized on 12/2/2024 3:29 PM by Jackie More M.D..       CT Head Without Contrast [[id number]] Collected: 12/02/24 1503     Updated: 12/02/24 1506    Narrative:      PROCEDURE: CT HEAD WO CONTRAST-     HISTORY: AMS     COMPARISON: 12/22/2020.     TECHNIQUE: Multiple axial CT images were performed from the foramen  magnum to the vertex without enhancement. This study was performed  with  techniques to keep radiation doses as low as reasonably achievable  (ALARA). Individualized dose reduction techniques using automated  exposure control or adjustment of mA and/or kV according to the patient  size were employed.     FINDINGS: There is no CT evidence of hemorrhage. There is no mass, mass  effect or midline shift.  There is no hydrocephalus. The paranasal  sinuses are clear. Bone windows reveal no acute osseous abnormalities.       Impression:      No acute intracranial process.              This report was finalized on 12/2/2024 3:04 PM by Jackie More M.D..       CT Abdomen Pelvis Without Contrast [[id number]] Collected: 12/02/24 1455     Updated: 12/02/24 1459    Narrative:      PROCEDURE: CT ABDOMEN PELVIS WO CONTRAST-     HISTORY: Pain w/ vomiting     COMPARISON: None .     PROCEDURE: Axial images were obtained from the lung bases through the  pubic symphysis without intravenous contrast. . This study was performed  with techniques to keep radiation doses as low as reasonably achievable  (ALARA). Individualized dose reduction techniques using automated  exposure control or adjustment of mA and/or kV according to the patient  size were employed.     FINDINGS:     ABDOMEN: There are bilateral pleural effusions and bibasilar  atelectasis. The heart size is normal. Noncontrast images of the liver  are unremarkable with no focal hepatic lesionsl. The spleen is normal.  No adrenal masses are seen.  The pancreas has an unremarkable unenhanced  appearance.. The aorta is normal in caliber. There is no significant  free fluid or adenopathy. There is no nephrolithiasis. There is no  hydronephrosis. Limited noncontrast images of the bowel are  unremarkable.     PELVIS: The appendix is not identified. There are a few scattered  colonic diverticula without evidence of diverticulitis. The patient is  status post hysterectomy. The urinary bladder is unremarkable. There is  no significant fluid or  adenopathy. Bone windows reveal no acute osseous  abnormalities or lytic/destructive lesions. The extraperitoneal soft  tissues are unremarkable.       Impression:      No hydronephrosis or nephrolithiasis.              This report was finalized on 12/2/2024 2:57 PM by Jackie More M.D..             Results for orders placed during the hospital encounter of 01/28/22    Adult Transthoracic Echo Complete W/ Cont if Necessary Per Protocol    Interpretation Summary  · The study is technically difficult for diagnosis.  · Normal left ventricular cavity size and wall thickness noted. All left ventricular wall segments contract normally.  · Left ventricular ejection fraction appears to be 56 - 60%.  · The aortic valve is not well visualized. The aortic valve is grossly normal in structure.  · The mitral valve is structurally normal with no significant stenosis present. Trace mitral valve regurgitation is present.  · There is no evidence of pericardial effusion. .            Assessment/Plan:  68-year-old female with history of DVT/PE, recently diagnosed A-fib anticoagulated with Eliquis, hypertension, obesity, tobacco abuse, diabetes, chronic occipital stroke and hyperlipidemia presented to the ED altered had been complaining of severe headache and dizziness with any body movement, also complaining of diarrhea for the past 3 to 4 days.  Had noticed frequent falling, hallucinations, running into things for the past several months.      #Headache with dizziness, nausea/vomiting, improving.  RCVS versus CVST  #History of DVT/PE anticoagulated with Eliquis  #History of right occipital stroke  #Current everyday smoker      -CTA head/neck  -MRV head  -Recommend repeating MRI brain in 3 months due to concern for osmotic demyelination syndrome      The case was discussed with the patient, her family at bedside, and Dr. Du.    Thank you for the consultation, please feel free to call with any questions.    Peter Gordon  Binh, BENJI  December 3, 2024  09:23 EST    Verbal consent taken.  Patient agreeable to be seen via telemedicine. Dr. Munoz present during encounter via telemedicine.    This was an audio and video enabled telemedicine encounter.      Please note that portions of this note were completed with a voice recognition program. Efforts were made to edit dictation, but occasionally words are mistranscribed.

## 2024-12-04 LAB
ANION GAP SERPL CALCULATED.3IONS-SCNC: 11.1 MMOL/L (ref 5–15)
BASOPHILS # BLD AUTO: 0.07 10*3/MM3 (ref 0–0.2)
BASOPHILS NFR BLD AUTO: 0.5 % (ref 0–1.5)
BUN SERPL-MCNC: 16 MG/DL (ref 8–23)
BUN/CREAT SERPL: 18.8 (ref 7–25)
CALCIUM SPEC-SCNC: 7.7 MG/DL (ref 8.6–10.5)
CHLORIDE SERPL-SCNC: 102 MMOL/L (ref 98–107)
CO2 SERPL-SCNC: 25.9 MMOL/L (ref 22–29)
CREAT SERPL-MCNC: 0.85 MG/DL (ref 0.57–1)
D DIMER PPP FEU-MCNC: 0.6 MCGFEU/ML (ref 0–0.66)
DEPRECATED RDW RBC AUTO: 47.4 FL (ref 37–54)
EGFRCR SERPLBLD CKD-EPI 2021: 75.7 ML/MIN/1.73
EOSINOPHIL # BLD AUTO: 0.12 10*3/MM3 (ref 0–0.4)
EOSINOPHIL NFR BLD AUTO: 0.9 % (ref 0.3–6.2)
ERYTHROCYTE [DISTWIDTH] IN BLOOD BY AUTOMATED COUNT: 13.2 % (ref 12.3–15.4)
GLUCOSE BLDC GLUCOMTR-MCNC: 112 MG/DL (ref 70–130)
GLUCOSE BLDC GLUCOMTR-MCNC: 155 MG/DL (ref 70–130)
GLUCOSE BLDC GLUCOMTR-MCNC: 204 MG/DL (ref 70–130)
GLUCOSE BLDC GLUCOMTR-MCNC: 269 MG/DL (ref 70–130)
GLUCOSE SERPL-MCNC: 195 MG/DL (ref 65–99)
HCT VFR BLD AUTO: 38.4 % (ref 34–46.6)
HGB BLD-MCNC: 12 G/DL (ref 12–15.9)
IMM GRANULOCYTES # BLD AUTO: 0.07 10*3/MM3 (ref 0–0.05)
IMM GRANULOCYTES NFR BLD AUTO: 0.5 % (ref 0–0.5)
LYMPHOCYTES # BLD AUTO: 3.15 10*3/MM3 (ref 0.7–3.1)
LYMPHOCYTES NFR BLD AUTO: 24.2 % (ref 19.6–45.3)
MAGNESIUM SERPL-MCNC: 1.7 MG/DL (ref 1.6–2.4)
MAGNESIUM SERPL-MCNC: 1.9 MG/DL (ref 1.6–2.4)
MCH RBC QN AUTO: 30.2 PG (ref 26.6–33)
MCHC RBC AUTO-ENTMCNC: 31.3 G/DL (ref 31.5–35.7)
MCV RBC AUTO: 96.7 FL (ref 79–97)
MONOCYTES # BLD AUTO: 0.91 10*3/MM3 (ref 0.1–0.9)
MONOCYTES NFR BLD AUTO: 7 % (ref 5–12)
NEUTROPHILS NFR BLD AUTO: 66.9 % (ref 42.7–76)
NEUTROPHILS NFR BLD AUTO: 8.7 10*3/MM3 (ref 1.7–7)
NRBC BLD AUTO-RTO: 0 /100 WBC (ref 0–0.2)
PHOSPHATE SERPL-MCNC: 3.1 MG/DL (ref 2.5–4.5)
PLATELET # BLD AUTO: 237 10*3/MM3 (ref 140–450)
PMV BLD AUTO: 10.4 FL (ref 6–12)
POTASSIUM SERPL-SCNC: 3.7 MMOL/L (ref 3.5–5.2)
QT INTERVAL: 412 MS
QTC INTERVAL: 463 MS
RBC # BLD AUTO: 3.97 10*6/MM3 (ref 3.77–5.28)
SODIUM SERPL-SCNC: 139 MMOL/L (ref 136–145)
WBC NRBC COR # BLD AUTO: 13.02 10*3/MM3 (ref 3.4–10.8)

## 2024-12-04 PROCEDURE — 80048 BASIC METABOLIC PNL TOTAL CA: CPT | Performed by: STUDENT IN AN ORGANIZED HEALTH CARE EDUCATION/TRAINING PROGRAM

## 2024-12-04 PROCEDURE — 85025 COMPLETE CBC W/AUTO DIFF WBC: CPT | Performed by: HOSPITALIST

## 2024-12-04 PROCEDURE — 25010000002 LORAZEPAM PER 2 MG

## 2024-12-04 PROCEDURE — 99232 SBSQ HOSP IP/OBS MODERATE 35: CPT | Performed by: HOSPITALIST

## 2024-12-04 PROCEDURE — 25010000002 HALOPERIDOL LACTATE PER 5 MG

## 2024-12-04 PROCEDURE — 84100 ASSAY OF PHOSPHORUS: CPT | Performed by: HOSPITALIST

## 2024-12-04 PROCEDURE — 36415 COLL VENOUS BLD VENIPUNCTURE: CPT | Performed by: STUDENT IN AN ORGANIZED HEALTH CARE EDUCATION/TRAINING PROGRAM

## 2024-12-04 PROCEDURE — 94799 UNLISTED PULMONARY SVC/PX: CPT

## 2024-12-04 PROCEDURE — 99221 1ST HOSP IP/OBS SF/LOW 40: CPT | Performed by: PSYCHIATRY & NEUROLOGY

## 2024-12-04 PROCEDURE — 94664 DEMO&/EVAL PT USE INHALER: CPT

## 2024-12-04 PROCEDURE — 94761 N-INVAS EAR/PLS OXIMETRY MLT: CPT

## 2024-12-04 PROCEDURE — 97166 OT EVAL MOD COMPLEX 45 MIN: CPT

## 2024-12-04 PROCEDURE — 82948 REAGENT STRIP/BLOOD GLUCOSE: CPT

## 2024-12-04 PROCEDURE — 25010000002 MAGNESIUM SULFATE 4 GM/100ML SOLUTION: Performed by: HOSPITALIST

## 2024-12-04 PROCEDURE — 85379 FIBRIN DEGRADATION QUANT: CPT | Performed by: HOSPITALIST

## 2024-12-04 PROCEDURE — 99232 SBSQ HOSP IP/OBS MODERATE 35: CPT | Performed by: NURSE PRACTITIONER

## 2024-12-04 PROCEDURE — 63710000001 INSULIN LISPRO (HUMAN) PER 5 UNITS: Performed by: HOSPITALIST

## 2024-12-04 PROCEDURE — 83735 ASSAY OF MAGNESIUM: CPT

## 2024-12-04 PROCEDURE — 97530 THERAPEUTIC ACTIVITIES: CPT

## 2024-12-04 PROCEDURE — 83735 ASSAY OF MAGNESIUM: CPT | Performed by: HOSPITALIST

## 2024-12-04 PROCEDURE — 25810000003 SODIUM CHLORIDE 0.9 % SOLUTION: Performed by: HOSPITALIST

## 2024-12-04 PROCEDURE — 97116 GAIT TRAINING THERAPY: CPT

## 2024-12-04 RX ORDER — MAGNESIUM SULFATE HEPTAHYDRATE 40 MG/ML
4 INJECTION, SOLUTION INTRAVENOUS ONCE
Status: DISCONTINUED | OUTPATIENT
Start: 2024-12-04 | End: 2024-12-04

## 2024-12-04 RX ORDER — DIPHENHYDRAMINE HYDROCHLORIDE 50 MG/ML
25 INJECTION INTRAMUSCULAR; INTRAVENOUS ONCE
Status: DISCONTINUED | OUTPATIENT
Start: 2024-12-04 | End: 2024-12-05 | Stop reason: HOSPADM

## 2024-12-04 RX ORDER — LORAZEPAM 2 MG/ML
1 INJECTION INTRAMUSCULAR ONCE
Status: COMPLETED | OUTPATIENT
Start: 2024-12-04 | End: 2024-12-04

## 2024-12-04 RX ORDER — HALOPERIDOL 5 MG/ML
0.5 INJECTION INTRAMUSCULAR ONCE
Status: COMPLETED | OUTPATIENT
Start: 2024-12-04 | End: 2024-12-04

## 2024-12-04 RX ORDER — MAGNESIUM SULFATE HEPTAHYDRATE 40 MG/ML
4 INJECTION, SOLUTION INTRAVENOUS ONCE
Status: COMPLETED | OUTPATIENT
Start: 2024-12-04 | End: 2024-12-04

## 2024-12-04 RX ORDER — HALOPERIDOL 5 MG/ML
1 INJECTION INTRAMUSCULAR ONCE
Status: COMPLETED | OUTPATIENT
Start: 2024-12-04 | End: 2024-12-04

## 2024-12-04 RX ADMIN — SENNOSIDES AND DOCUSATE SODIUM 2 TABLET: 50; 8.6 TABLET ORAL at 08:34

## 2024-12-04 RX ADMIN — ARFORMOTEROL TARTRATE 15 MCG: 15 SOLUTION RESPIRATORY (INHALATION) at 06:34

## 2024-12-04 RX ADMIN — HALOPERIDOL LACTATE 1 MG: 5 INJECTION, SOLUTION INTRAMUSCULAR at 03:44

## 2024-12-04 RX ADMIN — APIXABAN 5 MG: 5 TABLET, FILM COATED ORAL at 08:34

## 2024-12-04 RX ADMIN — ARFORMOTEROL TARTRATE 15 MCG: 15 SOLUTION RESPIRATORY (INHALATION) at 18:24

## 2024-12-04 RX ADMIN — BUDESONIDE 0.5 MG: 0.5 SUSPENSION RESPIRATORY (INHALATION) at 18:24

## 2024-12-04 RX ADMIN — IPRATROPIUM BROMIDE 0.5 MG: 0.5 SOLUTION RESPIRATORY (INHALATION) at 06:34

## 2024-12-04 RX ADMIN — METOPROLOL TARTRATE 25 MG: 25 TABLET, FILM COATED ORAL at 20:35

## 2024-12-04 RX ADMIN — INSULIN LISPRO 2 UNITS: 100 INJECTION, SOLUTION INTRAVENOUS; SUBCUTANEOUS at 08:34

## 2024-12-04 RX ADMIN — NICOTINE 1 PATCH: 21 PATCH TRANSDERMAL at 08:35

## 2024-12-04 RX ADMIN — SERTRALINE HYDROCHLORIDE 25 MG: 25 TABLET ORAL at 08:35

## 2024-12-04 RX ADMIN — ROSUVASTATIN 40 MG: 20 TABLET, FILM COATED ORAL at 08:34

## 2024-12-04 RX ADMIN — AMLODIPINE BESYLATE 5 MG: 5 TABLET ORAL at 08:34

## 2024-12-04 RX ADMIN — Medication 10 ML: at 09:09

## 2024-12-04 RX ADMIN — IPRATROPIUM BROMIDE 0.5 MG: 0.5 SOLUTION RESPIRATORY (INHALATION) at 18:24

## 2024-12-04 RX ADMIN — APIXABAN 5 MG: 5 TABLET, FILM COATED ORAL at 20:35

## 2024-12-04 RX ADMIN — MAGNESIUM SULFATE HEPTAHYDRATE 4 G: 40 INJECTION, SOLUTION INTRAVENOUS at 08:35

## 2024-12-04 RX ADMIN — Medication 10 ML: at 20:35

## 2024-12-04 RX ADMIN — ASPIRIN 81 MG: 81 TABLET, CHEWABLE ORAL at 08:35

## 2024-12-04 RX ADMIN — METOPROLOL TARTRATE 25 MG: 25 TABLET, FILM COATED ORAL at 08:35

## 2024-12-04 RX ADMIN — HALOPERIDOL LACTATE 0.5 MG: 5 INJECTION, SOLUTION INTRAMUSCULAR at 22:28

## 2024-12-04 RX ADMIN — IPRATROPIUM BROMIDE 0.5 MG: 0.5 SOLUTION RESPIRATORY (INHALATION) at 00:06

## 2024-12-04 RX ADMIN — LISINOPRIL 20 MG: 10 TABLET ORAL at 20:35

## 2024-12-04 RX ADMIN — SODIUM CHLORIDE 100 ML/HR: 9 INJECTION, SOLUTION INTRAVENOUS at 01:30

## 2024-12-04 RX ADMIN — LISINOPRIL 20 MG: 10 TABLET ORAL at 08:34

## 2024-12-04 RX ADMIN — LORAZEPAM 1 MG: 2 INJECTION INTRAMUSCULAR; INTRAVENOUS at 02:55

## 2024-12-04 RX ADMIN — SENNOSIDES AND DOCUSATE SODIUM 2 TABLET: 50; 8.6 TABLET ORAL at 20:35

## 2024-12-04 RX ADMIN — INSULIN LISPRO 4 UNITS: 100 INJECTION, SOLUTION INTRAVENOUS; SUBCUTANEOUS at 12:26

## 2024-12-04 RX ADMIN — BUDESONIDE 0.5 MG: 0.5 SUSPENSION RESPIRATORY (INHALATION) at 06:34

## 2024-12-04 NOTE — PROGRESS NOTES
Neurology Progress Note       Chief Complaint:  Headaches/Dizziness/Hallucinations    Subjective    Subjective     Subjective:  Overnight the patient became increasingly agitated, started having visual hallucinations.  Per nursing report patient was seeing a man in the room and swung her pillow at them.  Was attempting to sign herself out AMA.  Staff spoke with her son who came to help patient remain calm.  She was refusing medications, was not wearing her telemetry.  On our exam this morning she was alert, answered most orientation questions except reported the month as November.  She tells us she indeed saw a stranger in the room with her which was frightening.  Tells us she has been having visual hallucinations at home as well.  She denies any new headaches.    Review of Systems   Constitutional:  Negative for chills and fever.   Respiratory: Negative.     Cardiovascular: Negative.    Skin: Negative.    Neurological:  Negative for seizures, speech difficulty, weakness and headaches.   Psychiatric/Behavioral:  Positive for behavioral problems, confusion and hallucinations. The patient is nervous/anxious.          Objective    Objective      Temp:  [97.7 °F (36.5 °C)-98.7 °F (37.1 °C)] 98.2 °F (36.8 °C)  Heart Rate:  [] 94  Resp:  [18-20] 18  BP: (150-166)/(70-89) 155/79    Neurological Exam  Mental Status  Awake and alert. Oriented only to person, place and situation. Speech is normal. Language is fluent with no aphasia. Attention and concentration are normal.    Cranial Nerves  CN III, IV, VI: Extraocular movements intact bilaterally. Normal lids and orbits bilaterally.  CN VII: Full and symmetric facial movement.  CN XII: Tongue midline without atrophy or fasciculations.    Motor  Normal muscle bulk throughout. Normal muscle tone. No abnormal involuntary movements.  Moving extremities equally, no focal motor deficit.    Coordination    No obvious dysmetria.    Gait    Not assessed.      Physical  Exam  Vitals and nursing note reviewed.   Constitutional:       General: She is awake.      Appearance: She is obese.   HENT:      Head: Normocephalic.      Mouth/Throat:      Pharynx: Oropharynx is clear.   Eyes:      General: Lids are normal.      Extraocular Movements: Extraocular movements intact.   Cardiovascular:      Rate and Rhythm: Normal rate.   Pulmonary:      Effort: Pulmonary effort is normal. No respiratory distress.   Skin:     General: Skin is warm and dry.   Neurological:      Mental Status: She is alert.   Psychiatric:         Mood and Affect: Mood normal.         Speech: Speech normal.         Behavior: Behavior normal.         Hospital Meds:  Scheduled- amLODIPine, 5 mg, Oral, Daily  apixaban, 5 mg, Oral, Q12H  arformoterol, 15 mcg, Nebulization, BID - RT  aspirin, 81 mg, Oral, Daily  budesonide, 0.5 mg, Nebulization, BID - RT  diphenhydrAMINE, 25 mg, Intravenous, Once  insulin lispro, 2-7 Units, Subcutaneous, 4x Daily PC & at Bedtime  ipratropium, 0.5 mg, Nebulization, 4x Daily - RT  isosorbide mononitrate, 60 mg, Oral, QAM  lisinopril, 20 mg, Oral, BID  magnesium sulfate, 4 g, Intravenous, Once  metoprolol tartrate, 25 mg, Oral, Q12H  nicotine, 1 patch, Transdermal, Q24H  pantoprazole, 40 mg, Oral, Q AM  rosuvastatin, 40 mg, Oral, Daily  senna-docusate sodium, 2 tablet, Oral, BID  sodium chloride, 10 mL, Intravenous, Q12H      Infusions- sodium chloride, 100 mL/hr, Last Rate: Stopped (12/04/24 0601)       PRNs-   albuterol    senna-docusate sodium **AND** polyethylene glycol **AND** bisacodyl **AND** bisacodyl    Calcium Replacement - Follow Nurse / BPA Driven Protocol    Calcium Replacement - Follow Nurse / BPA Driven Protocol    Calcium Replacement - Follow Nurse / BPA Driven Protocol    dextrose    dextrose    glucagon (human recombinant)    Magnesium Cardiology Dose Replacement - Follow Nurse / BPA Driven Protocol    Magnesium Standard Dose Replacement - Follow Nurse / BPA Driven Protocol     Magnesium Standard Dose Replacement - Follow Nurse / BPA Driven Protocol    nitroglycerin    Phosphorus Replacement - Follow Nurse / BPA Driven Protocol    Phosphorus Replacement - Follow Nurse / BPA Driven Protocol    Potassium Replacement - Follow Nurse / BPA Driven Protocol    Potassium Replacement - Follow Nurse / BPA Driven Protocol    sodium chloride    sodium chloride    sodium chloride    Results Review:    I reviewed the patient's new clinical results.    Imaging Results (Last 24 Hours)       Procedure Component Value Units Date/Time    MRI Angiogram Venogram Head [[id number]] Collected: 12/03/24 1035     Updated: 12/03/24 1040    Narrative:      PROCEDURE: MRI ANGIOGRAM VENOGRAM HEAD-     HISTORY: headaches, history blood clots; G93.41-Metabolic  encephalopathy; E83.42-Hypomagnesemia; E87.6-Hypokalemia;  E83.51-Hypocalcemia     PROCEDURE: MRV imaging was performed.     COMPARISON: None.     FINDINGS: The right transverse sinus is congenitally diminutive. There  is no evidence of dural venous thrombosis. The visualized cortical veins  are patent.       Impression:      Unremarkable MRV of the brain.           This report was finalized on 12/3/2024 10:38 AM by Jackie More M.D..             Results for orders placed during the hospital encounter of 12/02/24    Adult Transthoracic Echo Complete W/ Cont if Necessary Per Protocol    Interpretation Summary    Left ventricular systolic function is normal. Left ventricular ejection fraction appears to be 56 - 60%.    Left ventricular diastolic function is consistent with (grade Ia w/high LAP) impaired relaxation.    The left atrial cavity is mildly dilated.    The right atrial cavity is mildly  dilated.    Estimated right ventricular systolic pressure from tricuspid regurgitation is normal (<35 mmHg).    MRI Angiogram Venogram Head    Result Date: 12/3/2024  Unremarkable MRV of the brain.    This report was finalized on 12/3/2024 10:38 AM by Jackie  SAGAR More.      CT Angiogram Carotids    Result Date: 12/3/2024  1. Calcific and soft plaque in the right carotid bulb and proximal ICA producing a 60% stenosis. 2. Calcific plaque in the left carotid bulb and proximal ICA producing a 40% stenosis. 3. No hemodynamically significant stenosis in the intracranial vasculature. 4. Partially imaged right pleural effusion.       This report was finalized on 12/3/2024 9:46 AM by Jackie More M.D..      CT Angiogram Head    Result Date: 12/3/2024  1. Calcific and soft plaque in the right carotid bulb and proximal ICA producing a 60% stenosis. 2. Calcific plaque in the left carotid bulb and proximal ICA producing a 40% stenosis. 3. No hemodynamically significant stenosis in the intracranial vasculature. 4. Partially imaged right pleural effusion.       This report was finalized on 12/3/2024 9:46 AM by Jackie More M.D..      MRI Brain Without Contrast    Result Date: 12/2/2024  Impression: 1. Abnormal T2 signal as described, correlate clinically to exclude osmotic demyelination syndrome. 2. Abnormal gradient echo hypointensity left lateral ventricle, compare with prior imaging or further evaluation with CT recommended.  This report was finalized on 12/2/2024 9:54 PM by Hiram Bird DO.      XR Chest 1 View    Result Date: 12/2/2024  No acute cardiopulmonary process.   This report was finalized on 12/2/2024 3:29 PM by Jackie More M.D..      CT Head Without Contrast    Result Date: 12/2/2024  No acute intracranial process.     This report was finalized on 12/2/2024 3:04 PM by Jackie More M.D..      CT Abdomen Pelvis Without Contrast    Result Date: 12/2/2024  No hydronephrosis or nephrolithiasis.     This report was finalized on 12/2/2024 2:57 PM by Jackie More M.D..        Lab Results   Component Value Date    HGBA1C 8.00 (H) 01/28/2022      Lab Results   Component Value Date    CHOL 202 (H) 01/28/2022    TRIG 112 01/28/2022    HDL 43  01/28/2022     (H) 01/28/2022      Lab Results   Component Value Date    WBC 13.02 (H) 12/04/2024    HGB 12.0 12/04/2024    HCT 38.4 12/04/2024    MCV 96.7 12/04/2024     12/04/2024      Lab Results   Component Value Date    GLUCOSE 195 (H) 12/04/2024    BUN 16 12/04/2024    CREATININE 0.85 12/04/2024    EGFRIFNONA 64 01/28/2022    BCR 18.8 12/04/2024    K 3.7 12/04/2024    CO2 25.9 12/04/2024    CALCIUM 7.7 (L) 12/04/2024    ALBUMIN 3.4 (L) 12/02/2024    AST 15 12/02/2024    ALT 17 12/02/2024      Lab Results   Component Value Date    TSH 1.890 01/28/2022     Lab Results   Component Value Date    IIXTKSYP32 298 12/03/2024     Lab Results   Component Value Date    FOLATE 7.96 12/03/2024             Assessment/Plan     Assessment/Plan:  68-year-old female with history of DVT/PE, recently diagnosed A-fib anticoagulated with Eliquis, hypertension, obesity, tobacco abuse, diabetes, chronic occipital stroke and hyperlipidemia presented to the ED altered had been complaining of severe headache and dizziness with any body movement, also complaining of diarrhea for the past 3 to 4 days.  Had noticed frequent falling, hallucinations, running into things for the past several months.      No acute abnormalities on initial CT head.  MRI brain showed chronic infarct in right occipital lobe as well as abnormal T2 signal in central mony which spares the peripheral fibers.  CTA head and neck reported 60% stenosis in proximal right ICA, 40% in left.  Also noted on CTA were some narrowing in bilateral MCAs.  The MRV was unremarkable.     #Headache with dizziness, nausea/vomiting, improving.  Still some concern for RCVS with narrowing noted in bilateral MCAs.  Patient has not had any more headaches since admission.  MRV was unremarkable  #History of DVT/PE/A-fib anticoagulated with Eliquis  #History of right occipital stroke  #Current everyday smoker  #Hallucinations          -Recommend repeating MRI brain in 3 months  due to concern for osmotic demyelination syndrome raised by abnormal T2 signal in central mony  -Recommend inpatient psychiatry consult for hallucinations  -Normal blood pressure goals  -Recommend considering transfer for cerebral angiogram for any more thunderclap headaches symptoms  -Okay to continue Eliquis, also on aspirin for CAD  -Recommend sertraline taper as sertraline is a risk factor for RCVS  -Smoking cessation education  - Delirium precautions: Lights on, shades open from 0700 to 1900 daily with frequent reorientation. Typical risk factors for delirium include advanced age, premorbid neurological disease, significant impairment in hearing or vision, critical illness, or prolonged hospital admission.         The case was discussed with the patient, and Dr. Du.    Peter Purcell, APRN  12/04/24  10:28 EST    This was an audio and video enabled telemedicine encounter.  Dr. Munoz present for encounter via telemedicine.  Verbal consent taken.

## 2024-12-04 NOTE — PROGRESS NOTES
[facility] HOSPITALIST PROGRESS NOTE     Patient Identification:  Name:  Leisa Vasquez  Age:  68 y.o.  Sex:  female  :  1955  MRN:  [id number]  Visit Number:  [id number]  Primary Care Provider:  Leisa Balderas MD    Length of stay:  2    Subjective: Patient has episodes of visual hallucination, she is not confused, she is aware that she is hallucinating but is petrified of the thought.  She also is very agitated because she is prevented from getting out of bed due to gait instability, the bed alarm worsens her agitation.  Right explained to patient together with  and son although she is improving especially her mobility, for safety precaution, would refrain from getting out of bed without assistance.  Yesterday briefly she thought she saw some babies on the floor, today she also thought a man/stranger entered the room and took her phone.    Chief Complaint: Dizziness and hallucination  ----------------------------------------------------------------------------------------------------------------------  Current Hospital Meds:  amLODIPine, 5 mg, Oral, Daily  apixaban, 5 mg, Oral, Q12H  arformoterol, 15 mcg, Nebulization, BID - RT  aspirin, 81 mg, Oral, Daily  budesonide, 0.5 mg, Nebulization, BID - RT  diphenhydrAMINE, 25 mg, Intravenous, Once  insulin lispro, 2-7 Units, Subcutaneous, 4x Daily PC & at Bedtime  ipratropium, 0.5 mg, Nebulization, 4x Daily - RT  isosorbide mononitrate, 60 mg, Oral, QAM  lisinopril, 20 mg, Oral, BID  metoprolol tartrate, 25 mg, Oral, Q12H  nicotine, 1 patch, Transdermal, Q24H  pantoprazole, 40 mg, Oral, Q AM  rosuvastatin, 40 mg, Oral, Daily  senna-docusate sodium, 2 tablet, Oral, BID  sodium chloride, 10 mL, Intravenous, Q12H      sodium chloride, 100 mL/hr, Last Rate: Stopped (24)      ----------------------------------------------------------------------------------------------------------------------  Vital Signs:  Temp:  [97.7 °F (36.5  °C)-98.7 °F (37.1 °C)] 98.3 °F (36.8 °C)  Heart Rate:  [] 91  Resp:  [16-18] 16  BP: (143-166)/(65-89) 143/65       Tele: Sinus rhythm 91 bpm      12/02/24  1928 12/03/24  0500 12/04/24  0500   Weight: 101 kg (223 lb 5.2 oz) 101 kg (223 lb 5.2 oz) (new admit less than 24 hours) 101 kg (222 lb 10.6 oz)     Body mass index is 34.87 kg/m².    Intake/Output Summary (Last 24 hours) at 12/4/2024 1352  Last data filed at 12/4/2024 0800  Gross per 24 hour   Intake 478.79 ml   Output 650 ml   Net -171.21 ml     Diet: Diabetic; Consistent Carbohydrate; Fluid Consistency: Thin (IDDSI 0)  ----------------------------------------------------------------------------------------------------------------------  Physical exam:  General: Comfortable,awake, alert, oriented to self, place, and time, well-developed and well-nourished.  No respiratory distress.    Skin:  Skin is warm and dry. No rash noted. No pallor.    HENT:  Head:  Normocephalic and atraumatic.  Mouth:  Moist mucous membranes.    Eyes:  Conjunctivae and EOM are normal.  Pupils are equal, round, and reactive to light.  No scleral icterus.  No nystagmus  Neck:  Neck supple.  No JVD present.  No bruit  Pulmonary/Chest:  No respiratory distress, no wheezes, no crackles, with normal breath sounds and good air movement.  Cardiovascular:  Normal rate, regular rhythm and normal heart sounds with no murmur.  Abdominal:  Soft.  Bowel sounds are normal.  No distension and no tenderness.   Extremities:  No edema, no tenderness, and no deformity.  No red or swollen joints anywhere.  Strong pulses in all 4 extremities with no clubbing, no cyanosis, no edema.  Neurological:  Motor strength equal no obvious deficit, sensory grossly intact.   No cranial nerve deficit.  No tongue deviation.  No facial droop.  No slurred speech.    Genitourinary: No Bush  "catheter  Back:  ----------------------------------------------------------------------------------------------------------------------  ----------------------------------------------------------------------------------------------------------------------  Results from last 7 days   Lab Units 12/02/24 1838 12/02/24  1502   HSTROP T ng/L 20* 19*     Results from last 7 days   Lab Units 12/04/24 0023 12/03/24 0029 12/02/24 1838 12/02/24  1342   LACTATE mmol/L  --   --  1.5 2.6*   WBC 10*3/mm3 13.02* 17.16*  --  17.06*   HEMOGLOBIN g/dL 12.0 12.8  --  14.6   HEMATOCRIT % 38.4 39.8  --  45.2   MCV fL 96.7 94.1  --  92.2   MCHC g/dL 31.3* 32.2  --  32.3   PLATELETS 10*3/mm3 237 262  --  308   INR   --   --   --  1.48*     Results from last 7 days   Lab Units 12/03/24  1617   PH, ARTERIAL pH units 7.482*   PO2 ART mm Hg 88.7   PCO2, ARTERIAL mm Hg 43.5   HCO3 ART mmol/L 32.5*     Results from last 7 days   Lab Units 12/04/24  0448 12/04/24 0023 12/03/24  1433 12/03/24 0029 12/02/24  1838 12/02/24  1502   SODIUM mmol/L  --  139  --  140 138 138   POTASSIUM mmol/L  --  3.7 3.7 3.0* 2.7* 2.6*   MAGNESIUM mg/dL 1.7 1.9 2.0 1.6  --  0.3*   CHLORIDE mmol/L  --  102  --  95* 92* 91*   CO2 mmol/L  --  25.9  --  33.8* 31.5* 29.6*   BUN mg/dL  --  16  --  12 13 12   CREATININE mg/dL  --  0.85  --  0.64 0.59 0.56*   CALCIUM mg/dL  --  7.7*  --  7.5* 6.4* 6.6*   GLUCOSE mg/dL  --  195*  --  200* 258* 277*   ALBUMIN g/dL  --   --   --   --   --  3.4*   BILIRUBIN mg/dL  --   --   --   --   --  1.4*   ALK PHOS U/L  --   --   --   --   --  54   AST (SGOT) U/L  --   --   --   --   --  15   ALT (SGPT) U/L  --   --   --   --   --  17   Estimated Creatinine Clearance: 79.6 mL/min (by C-G formula based on SCr of 0.85 mg/dL).    No results found for: \"AMMONIA\"      Blood Culture   Date Value Ref Range Status   12/02/2024 No growth at 24 hours  Preliminary   12/02/2024 No growth at 24 hours  Preliminary     No results found for: " "\"URINECX\"  No results found for: \"WOUNDCX\"  No results found for: \"STOOLCX\"    I have personally looked at the labs and they are summarized above.  ----------------------------------------------------------------------------------------------------------------------  Imaging Results (Last 24 Hours)       ** No results found for the last 24 hours. **          ----------------------------------------------------------------------------------------------------------------------  Assessment and Plan:  -Episode of severe dizziness with nausea and vomiting resolved  -Severe electrolyte derangement resolved  -Diabetes type 2 with hyperglycemia  -Episodes of hallucination and psychosis  -Incidental finding of chronic right occipital stroke  -Tobacco use disorder  -Chronic hypoxic respiratory failure  -History of hyperlipidemia  -Obesity with BMI of 35  -Atrial fibrillation paroxysmal rate controlled  -Nonobstructive carotid artery disease  -History of PE on chronic anticoagulation    Discussed with neurology, was able to gather history of thunderclap headache at home but not in the facility, no recurrence, neurology recommended to transfer the patient to [facility] should she develop recurrence of thunderclap headache for concerns of possible RCVS.  In the meantime we will discontinue sertraline which she receives 25 mg daily for \"mood\".  Consult psychiatry for hallucination which apparently has been going on at home.  In the meantime fall precautions, advised family and staff to allow patient to use wheelchair to go out of the room to minimize delirium.  Delirium precautions.  Bedside commode.    Plan discussed at length with patient and son.      Disposition pending      Brigida Du MD  12/04/24  13:52 EST  "

## 2024-12-04 NOTE — THERAPY TREATMENT NOTE
Acute Care - Physical Therapy Treatment Note  DENOTE Chavira     Patient Name: Leisa Vasquez  : 1955  MRN: [id number]  Today's Date: 2024      Visit Dx:     ICD-10-CM ICD-9-CM   1. Metabolic encephalopathy  G93.41 348.31   2. Hypomagnesemia  E83.42 275.2   3. Hypokalemia  E87.6 276.8   4. Hypocalcemia  E83.51 275.41     Patient Active Problem List   Diagnosis    Pulmonary embolism    Essential hypertension    Hyperlipidemia    Coronary artery disease s/p statnting int he past    COPD (chronic obstructive pulmonary disease)    T2DM (type 2 diabetes mellitus)    Obesity (BMI 30-39.9)    Leukocytosis    Acute respiratory failure with hypoxia    Macrocytosis    Osteoarthritis    Tobacco abuse    Degenerative changes mid cervical spine with mild spinal stenosis C5-6 and C6-7    Vertigo     Past Medical History:   Diagnosis Date    Arthritis     COPD (chronic obstructive pulmonary disease)     Coronary artery disease s/p statnting int he past 2020    Diabetes     Diabetes mellitus     Essential hypertension 2020    Heart & renal disease, hypertensive malignant with heart failure     Heart attack     History of bronchitis     Hyperlipidemia 2020    Low back pain     Obesity (BMI 30-39.9) 2020    Pneumonia     Pulmonary embolism      Past Surgical History:   Procedure Laterality Date    CHOLECYSTECTOMY      CORONARY STENT PLACEMENT  2013    3.0x23mm xieuce mid RCA done at ; LAD 2.27tyg22zk Ref #[id number] done at [facility] 12    HYSTERECTOMY       PT Assessment (Last 12 Hours)       PT Evaluation and Treatment       Row Name 24 1508          Physical Therapy Time and Intention    Subjective Information complains of;weakness;fatigue;dizziness  -KM     Document Type therapy note (daily note)  -KM     Mode of Treatment physical therapy  -KM     Patient Effort good  -KM     Symptoms Noted During/After Treatment dizziness  -KM       Row Name 24 8452           General Information    Patient Profile Reviewed yes  -KM     Patient Observations alert;cooperative;agree to therapy  -KM     Existing Precautions/Restrictions fall  -KM       Row Name 12/04/24 1503          Cognition    Affect/Mental Status (Cognition) confused  -KM     Orientation Status (Cognition) oriented to;person  -KM     Follows Commands (Cognition) follows one-step commands  -KM       Row Name 12/04/24 1503          Bed Mobility    Bed Mobility supine-sit  -KM     Supine-Sit [place] (Bed Mobility) minimum assist (75% patient effort)  -KM     Assistive Device (Bed Mobility) bed rails;head of bed elevated  -KM       Row Name 12/04/24 1503          Transfers    Transfers sit-stand transfer;stand-sit transfer  -KM       Row Name 12/04/24 1503          Sit-Stand Transfer    Sit-Stand [place] (Transfers) minimum assist (75% patient effort);moderate assist (50% patient effort)  -KM     Assistive Device (Sit-Stand Transfers) walker, front-wheeled  -KM       Row Name 12/04/24 1503          Stand-Sit Transfer    Stand-Sit [place] (Transfers) minimum assist (75% patient effort)  -KM     Assistive Device (Stand-Sit Transfers) walker, front-wheeled  -KM       Row Name 12/04/24 1503          Gait/Stairs (Locomotion)    Gait/Stairs Locomotion gait/ambulation independence;gait/ambulation assistive device;distance ambulated  -KM     [place] Level (Gait) minimum assist (75% patient effort)  -KM     Assistive Device (Gait) walker, front-wheeled  -KM     Patient was able to Ambulate yes  -KM     Distance in Feet (Gait) 50  x2  -KM     Pattern (Gait) step-to  -KM     Deviations/Abnormal Patterns (Gait) ataxic;base of support, narrow;gait speed decreased  -KM     Bilateral Gait Deviations forward flexed posture  -KM       Row Name 12/04/24 1503          Safety Issues/Impairments Affecting Functional Mobility    Impairments Affecting Function (Mobility) balance;endurance/activity tolerance;strength;cognition   -KM       Row Name 12/04/24 1503          Balance    Balance Assessment sitting static balance;standing dynamic balance  -KM     Static Sitting Balance standby assist  -KM     Position, Sitting Balance sitting edge of bed  -KM     Dynamic Standing Balance minimal assist  -KM     Position/Device Used, Standing Balance walker, front-wheeled  -KM       Row Name             Wound 12/04/24 0137 Right lower arm    Wound - Properties Group Placement Date: 12/04/24  - Placement Time: 0137 -MF Side: Right  -MF Orientation: lower  -MF Location: arm  -MF Primary Wound Type: Extravasatio  -MF    Retired Wound - Properties Group Placement Date: 12/04/24  - Placement Time: 0137  -MF Side: Right  -MF Orientation: lower  -MF Location: arm  -MF Primary Wound Type: Extravasatio  -MF    Retired Wound - Properties Group Placement Date: 12/04/24  - Placement Time: 0137 -MF Side: Right  -MF Orientation: lower  -MF Location: arm  -MF Primary Wound Type: Extravasatio  -MF    Retired Wound - Properties Group Date first assessed: 12/04/24  - Time first assessed: 0137  -MF Side: Right  -MF Location: arm  -MF Primary Wound Type: Extravasatio  -MF      Row Name 12/04/24 1503          Progress Summary (PT)    Daily Progress Summary (PT) Pt. was able to demonstrate improved functional mobility skills compared to initial evaluation. She was confused during PT session and was agitated throughout. She was able to increase ambulation distance w/ RW. Pt. would continue to benefit from skilled PT services.  -KM               User Key  (r) = Recorded By, (t) = Taken By, (c) = Cosigned By      Initials Name Provider Type    Cesar Gil, PT Physical Therapist    Aviva Saunders, RN Registered Nurse                    Physical Therapy Education       Title: PT OT SLP Therapies (In Progress)       Topic: Physical Therapy (In Progress)       Point: Mobility training (In Progress)       Learning Progress Summary            Patient Acceptance,  E,TB, NR by MP at 12/4/2024 0932    Acceptance, E, NR by  at 12/3/2024 2232    Acceptance, E,TB, VU by KM at 12/3/2024 1509                      Point: Home exercise program (In Progress)       Learning Progress Summary            Patient Acceptance, E,TB, NR by MP at 12/4/2024 0932    Acceptance, E, NR by  at 12/3/2024 2232    Acceptance, E,TB, VU by KM at 12/3/2024 1509                      Point: Body mechanics (In Progress)       Learning Progress Summary            Patient Acceptance, E,TB, NR by MP at 12/4/2024 0932    Acceptance, E, NR by  at 12/3/2024 2232    Acceptance, E,TB, VU by KM at 12/3/2024 1509                      Point: Precautions (In Progress)       Learning Progress Summary            Patient Acceptance, E,TB, NR by MP at 12/4/2024 0932    Acceptance, E, NR by  at 12/3/2024 2232    Acceptance, E,TB, VU by KM at 12/3/2024 1509                                      User Key       Initials Effective Dates Name Provider Type Discipline     06/16/21 -  Leah George, RN Registered Nurse Nurse     05/24/22 -  Cesar Thomason PT Physical Therapist PT     06/21/23 -  Aviva Carrion, VENU Registered Nurse Nurse                  PT Recommendation and Plan  Anticipated Discharge Disposition (PT): inpatient rehabilitation facility  Planned Therapy Interventions (PT): balance training, bed mobility training, gait training, home exercise program, patient/family education, postural re-education, ROM (range of motion), stair training, strengthening, stretching, transfer training  Therapy Frequency (PT): 3 times/wk (3-5x/wk)  Progress Summary (PT)  Daily Progress Summary (PT): Pt. was able to demonstrate improved functional mobility skills compared to initial evaluation. She was confused during PT session and was agitated throughout. She was able to increase ambulation distance w/ RW. Pt. would continue to benefit from skilled PT services.  Plan of Care Reviewed With: patient, significant  other  Outcome Evaluation: Pt. evaluation completed during PT session. She was able to perform functional mobility skills w/ Codey-modA. She was able to ambulate minimal distance w/ Codey for steadying. Pt. c/o dizziness w/ sudden movement and poor balance in standing. Pt. would benefit from increased PT services to address impaired balance, mobility, safety precautions. and fall risk.       Time Calculation:    PT Charges       Row Name 12/04/24 1502             Time Calculation    PT Received On 12/04/24  -KM         Time Calculation- PT    Total Timed Code Minutes- PT 25 minute(s)  -KM                User Key  (r) = Recorded By, (t) = Taken By, (c) = Cosigned By      Initials Name Provider Type    Cesar Gil, PT Physical Therapist                  Therapy Charges for Today       Code Description Service Date Service Provider Modifiers Qty    22932119846 HC PT EVAL MOD COMPLEXITY 4 12/3/2024 Cesar Thomason, PT GP 1    92246979736 HC PT THERAPEUTIC ACT EA 15 MIN 12/4/2024 Cesar Thomason, PT GP 1    11951728919 HC GAIT TRAINING EA 15 MIN 12/4/2024 Cesar Thomason, PT GP 1            PT G-Codes  AM-PAC 6 Clicks Score (PT): 20    Cesar Thomason PT  12/4/2024

## 2024-12-04 NOTE — THERAPY EVALUATION
Patient Name: Leisa Vasquez  : 1955    MRN: [id number]                              Today's Date: 2024       Admit Date: 2024    Visit Dx:     ICD-10-CM ICD-9-CM   1. Metabolic encephalopathy  G93.41 348.31   2. Hypomagnesemia  E83.42 275.2   3. Hypokalemia  E87.6 276.8   4. Hypocalcemia  E83.51 275.41     Patient Active Problem List   Diagnosis    Pulmonary embolism    Essential hypertension    Hyperlipidemia    Coronary artery disease s/p statnting int he past    COPD (chronic obstructive pulmonary disease)    T2DM (type 2 diabetes mellitus)    Obesity (BMI 30-39.9)    Leukocytosis    Acute respiratory failure with hypoxia    Macrocytosis    Osteoarthritis    Tobacco abuse    Degenerative changes mid cervical spine with mild spinal stenosis C5-6 and C6-7    Vertigo     Past Medical History:   Diagnosis Date    Arthritis     COPD (chronic obstructive pulmonary disease)     Coronary artery disease s/p statnting int he past 2020    Diabetes     Diabetes mellitus     Essential hypertension 2020    Heart & renal disease, hypertensive malignant with heart failure     Heart attack     History of bronchitis     Hyperlipidemia 2020    Low back pain     Obesity (BMI 30-39.9) 2020    Pneumonia     Pulmonary embolism      Past Surgical History:   Procedure Laterality Date    CHOLECYSTECTOMY      CORONARY STENT PLACEMENT  2013    3.0x23mm xieuce mid RCA done at ; LAD 2.49wgn07sf Ref #[id number] done at [facility] 12    HYSTERECTOMY        General Information       Row Name 24 1445          OT Time and Intention    Subjective Information complains of;weakness;fatigue;dizziness  -KP     Document Type evaluation  -KP     Mode of Treatment individual therapy;occupational therapy  -KP     Patient Effort good  -KP     Symptoms Noted During/After Treatment dizziness;fatigue;shortness of breath  -KP       Row Name 24 1445          General Information    Patient  Profile Reviewed yes  -     Prior Level of Function independent:;all household mobility;ADL's  inetermittent assist, limited with IADLs due to poor standing tolerance per patient report  -     Existing Precautions/Restrictions fall  -     Barriers to Rehab cognitive status  alert and oriented but having hallucinations, difficult to redirect  -       Row Name 12/04/24 1445          Occupational Profile    Reason for Services/Referral (Occupational Profile) Patient was [facility] to [facility] on 12/2/2024. She was referred for OT evaluation due to change in functional performance with ADLs, functional mobility, and/or transfers.  -       Row Name 12/04/24 1445          Living Environment    People in Home significant other  -       Row Name 12/04/24 1445          Cognition    Orientation Status (Cognition) oriented x 3;other (see comments)  hallucinations of children  -       Row Name 12/04/24 1446          Safety Issues/Impairments Affecting Functional Mobility    Safety Issues Affecting Function (Mobility) awareness of need for assistance;insight into deficits/self-awareness;judgment;impulsivity  -     Impairments Affecting Function (Mobility) balance;endurance/activity tolerance;strength  -               User Key  (r) = Recorded By, (t) = Taken By, (c) = Cosigned By      Initials Name Provider Type     Desire Beyer, OT Occupational Therapist                     Mobility/ADL's       Row Name 12/04/24 1450          Bed Mobility    Bed Mobility supine-sit  -     Supine-Sit Rankin (Bed Mobility) minimum assist (75% patient effort)  -     Assistive Device (Bed Mobility) bed rails;head of bed elevated  -       Row Name 12/04/24 1450          Transfers    Transfers sit-stand transfer;stand-sit transfer  -       Row Name 12/04/24 1450          Sit-Stand Transfer    Sit-Stand [place] (Transfers) minimum assist (75% patient effort);moderate assist (50% patient effort)  -      Assistive Device (Sit-Stand Transfers) walker, front-wheeled  -[facility] Name 12/04/24 1450          Stand-Sit Transfer    Stand-Sit Boss (Transfers) minimum assist (75% patient effort)  -     Assistive Device (Stand-Sit Transfers) walker, front-wheeled  -[facility] Name 12/04/24 1450          Activities of Daily Living    BADL Assessment/Intervention bathing;upper body dressing;lower body dressing;grooming;feeding;toileting  -[facility] Name 12/04/24 1450          Bathing Assessment/Intervention    [place]oss Level (Bathing) bathing skills;moderate assist (50% patient effort)  -[facility] Name 12/04/24 1450          Upper Body Dressing Assessment/Training    Boss Level (Upper Body Dressing) upper body dressing skills;moderate assist (50% patient effort)  -[facility] Name 12/04/24 1450          Lower Body Dressing Assessment/Training    Boss Level (Lower Body Dressing) lower body dressing skills;moderate assist (50% patient effort)  -[facility] Name 12/04/24 1450          Grooming Assessment/Training    Boss Level (Grooming) grooming skills;moderate assist (50% patient effort)  -[facility] Name 12/04/24 1450          Self-Feeding Assessment/Training    Boss Level (Feeding) feeding skills;set up  -[facility] Name 12/04/24 1450          Toileting Assessment/Training    Boss Level (Toileting) toileting skills;moderate assist (50% patient effort)  -               User Key  (r) = Recorded By, (t) = Taken By, (c) = Cosigned By      Initials Name Provider Type     Desire Beyer OT Occupational Therapist                   Obj/Interventions       Row Name 12/04/24 1452          Sensory Assessment (Somatosensory)    Sensory Assessment (Somatosensory) UE sensation intact  -[facility] Name 12/04/24 1452          Vision Assessment/Intervention    Visual Impairment/Limitations WFL  -[facility] Name 12/04/24 1452          Range of Motion  Comprehensive    General Range of Motion bilateral upper extremity ROM WFL  -       Row Name 12/04/24 1452          Strength Comprehensive (MMT)    Comment, General Manual Muscle Testing (MMT) Assessment 4+/5 MMT in BUEs  -       Row Name 12/04/24 1452          Motor Skills    Motor Skills coordination;functional endurance  -     Coordination WFL;bilateral;upper extremity  -KP     Functional Endurance fair  -       Row Name 12/04/24 1452          Balance    Balance Assessment sitting static balance;standing static balance  -     Static Sitting Balance standby assist  -     Static Standing Balance minimal assist  -     Position/Device Used, Standing Balance walker, rolling  -               User Key  (r) = Recorded By, (t) = Taken By, (c) = Cosigned By      Initials Name Provider Type    Desire Freeman OT Occupational Therapist                   Goals/Plan       [facility] Name 12/04/24 1457          Transfer Goal 1 (OT)    Activity/Assistive Device (Transfer Goal 1, OT) transfers, all  -KP     [place] Level/Cues Needed (Transfer Goal 1, OT) standby assist  -     Time Frame (Transfer Goal 1, OT) by discharge  -[facility] Name 12/04/24 9755          Problem Specific Goal 1 (OT)    Problem Specific Goal 1 (OT) Patient will perform sustained activity X12 minutes to promote functional endurance/activity tolerance needed for daily occupations.  -     Time Frame (Problem Specific Goal 1, OT) by discharge  -[facility] Name 12/04/24 1457          Therapy Assessment/Plan (OT)    Planned Therapy Interventions (OT) activity tolerance training;BADL retraining;functional balance retraining;patient/caregiver education/training;occupation/activity based interventions;ROM/therapeutic exercise;strengthening exercise;transfer/mobility retraining  -               User Key  (r) = Recorded By, (t) = Taken By, (c) = Cosigned By      Initials Name Provider Type    Desire Freeman OT Occupational Therapist                    Clinical Impression       [facility] Name 12/04/24 1453          Pain Assessment    Pretreatment Pain Rating 0/10 - no pain  -     Posttreatment Pain Rating 0/10 - no pain  -[facility] Name 12/04/24 1453          Plan of Care Review    Plan of Care Reviewed With patient  -     Progress no change  -     Outcome Evaluation Patient seen for OT evaluation. She presents with functional performance limitations including generalized weakness, decreased safety with limited insight, impaired balance, and limited functional endurance. Patient would benefit from ongoing OT services to promote highest level of independence and safety prior to discharge.  -       Row Name 12/04/24 1453          Therapy Assessment/Plan (OT)    Patient/Family Therapy Goal Statement (OT) be able to go home  -     Rehab Potential (OT) good  -     Criteria for Skilled Therapeutic Interventions Met (OT) yes;meets criteria;skilled treatment is necessary  -     Therapy Frequency (OT) 3 times/wk  3-5x/week  -     Predicted Duration of Therapy Intervention (OT) discharge  -[facility] Name 12/04/24 1453          Therapy Plan Review/Discharge Plan (OT)    Anticipated Discharge Disposition (OT) other (see comments)  determined per cognition; at this time requires 24/7 assist  -[facility] Name 12/04/24 1453          Positioning and Restraints    Pre-Treatment Position in bed  -     Post Treatment Position bed  -     In Bed sitting;sitting EOB;call light within reach;encouraged to call for assist;exit alarm on;with other staff  -               User Key  (r) = Recorded By, (t) = Taken By, (c) = Cosigned By      Initials Name Provider Type     Desire Beyer, OT Occupational Therapist                   Outcome Measures       Row Name 12/04/24 0834          How much help from another person do you currently need...    Turning from your back to your side while in flat bed without using bedrails? 4  -MP     Moving from lying on  back to sitting on the side of a flat bed without bedrails? 4  -MP     Moving to and from a bed to a chair (including a wheelchair)? 3  -MP     Standing up from a chair using your arms (e.g., wheelchair, bedside chair)? 3  -MP     Climbing 3-5 steps with a railing? 3  -MP     To walk in hospital room? 3  -MP     AM-PAC 6 Clicks Score (PT) 20  -MP               User Key  (r) = Recorded By, (t) = Taken By, (c) = Cosigned By      Initials Name Provider Type    Leah Cole, RN Registered Nurse                    Occupational Therapy Education        No education to display                  OT Recommendation and Plan  Planned Therapy Interventions (OT): activity tolerance training, BADL retraining, functional balance retraining, patient/caregiver education/training, occupation/activity based interventions, ROM/therapeutic exercise, strengthening exercise, transfer/mobility retraining  Therapy Frequency (OT): 3 times/wk (3-5x/week)  Plan of Care Review  Plan of Care Reviewed With: patient  Progress: no change  Outcome Evaluation: Patient seen for OT evaluation. She presents with functional performance limitations including generalized weakness, decreased safety with limited insight, impaired balance, and limited functional endurance. Patient would benefit from ongoing OT services to promote highest level of independence and safety prior to discharge.     Time Calculation:         Time Calculation- OT       Row Name 12/04/24 1459             Time Calculation- OT    OT Received On 12/04/24  -                User Key  (r) = Recorded By, (t) = Taken By, (c) = Cosigned By      Initials Name Provider Type    Desire Freeman OT Occupational Therapist                  Therapy Charges for Today       Code Description Service Date Service Provider Modifiers Qty    95362630306  OT EVAL MOD COMPLEXITY 4 12/4/2024 Desire Beyer OT GO 1                 Desire Beyer OT  12/4/2024   [id number]

## 2024-12-04 NOTE — CONSULTS
"Referring Provider: Dr. Du  Reason for Consultation: visual hallucinations    Chief complaint/Focus of Exam: Visual hallucinations    Subjective .     History of present illness:    Leisa is a 68-year-old female with a history of COPD, history of PE who was admitted on 12/2/2024 for management of headaches, vomiting, dizziness and falls.  Psychiatry consulted for visual hallucinations.  Patient noted to be anxious, hallucinating, confused intermittently during hospitalization.    Patient interviewed in room today with family present with her permission.  Patient reports headaches, nausea, vomiting, confusion, vertigo, and balance and recent increase in falls over the last 2 to 3 months.  She reports visual hallucinations over the last 1 to 2 months, noted to range from \"starburst\" or lights to distinct objects/animals/people.  Patient appeared to be responding to visual hallucinations multiple time throughout our discussion.  Patient reports she is able to tell what is a hallucination and what is not, but she still chooses to interact with them, at times speaking to them or sometimes telling them to \"go away.\"   confirmed these reports, saying that the symptoms have worsened over the last month.  Patient does not report any significant distress because of these symptoms and would not like to start medication for them.  It does not appear to be interfering with her daily life to any significant degree.  Family does appear concerned about her recent symptom burden however.  Patient denies any significant psychiatric history, unclear why she was taking sertraline.  She displayed no symptoms suggesting mood or psychotic disorder.  She does report confusion and memory struggles, more so with recent events rather than long-term memories.  She denies SI, HI.    Review of Systems  Pertinent items are noted in HPI, all other systems reviewed and negative    History  Past Medical History:   Diagnosis Date    " Arthritis     COPD (chronic obstructive pulmonary disease)     Coronary artery disease s/p statnting int he past 12/22/2020    Diabetes     Diabetes mellitus     Essential hypertension 12/22/2020    Heart & renal disease, hypertensive malignant with heart failure     Heart attack     History of bronchitis     Hyperlipidemia 12/22/2020    Low back pain     Obesity (BMI 30-39.9) 12/22/2020    Pneumonia     Pulmonary embolism    ,   Past Surgical History:   Procedure Laterality Date    CHOLECYSTECTOMY      CORONARY STENT PLACEMENT  02/08/2013    3.0x23mm xieuce mid RCA done at ; LAD [id number] Ref #[id number] done at [facility] 12/29/12    HYSTERECTOMY     ,   Family History   Problem Relation Age of Onset    Cancer Mother     No Known Problems Father    ,   Social History     Socioeconomic History    Marital status:    Tobacco Use    Smoking status: Every Day     Current packs/day: 0.50     Average packs/day: 0.5 packs/day for 30.0 years (15.0 ttl pk-yrs)     Types: Cigarettes    Smokeless tobacco: Never   Vaping Use    Vaping status: Never Used   Substance and Sexual Activity    Alcohol use: No    Drug use: No    Sexual activity: Defer     E-cigarette/Vaping    E-cigarette/Vaping Use Never User     Passive Exposure No     Counseling Given No      E-cigarette/Vaping Substances    Nicotine No     THC No     CBD No     Flavoring No      E-cigarette/Vaping Devices    Disposable No     Pre-filled or Refillable Cartridge No     Refillable Tank No     Pre-filled Pod No          ,   Medications Prior to Admission   Medication Sig Dispense Refill Last Dose/Taking    carvedilol (COREG) 25 MG tablet Take 1 tablet by mouth 2 (Two) Times a Day With Meals.   Past Week    doxycycline (VIBRAMYCIN) 100 MG capsule Take 1 capsule by mouth Every 12 (Twelve) Hours.   Past Week    apixaban (ELIQUIS) 5 MG tablet tablet Take 1 tablet by mouth Every 12 (Twelve) Hours. Indications: DVT/PE (active thrombosis) 60 tablet 0 Unknown     ezetimibe (Zetia) 10 MG tablet Take 1 tablet by mouth Daily.   Unknown    isosorbide mononitrate (IMDUR) 60 MG 24 hr tablet Take 1 tablet by mouth Every Morning.   Unknown    levalbuterol (XOPENEX) 0.63 MG/3ML nebulizer solution Take 1 ampule by nebulization Every 4 (Four) Hours As Needed for Wheezing or Shortness of Air.   Unknown    lisinopril (PRINIVIL,ZESTRIL) 20 MG tablet Take 1 tablet by mouth 2 (Two) Times a Day.   Unknown    metFORMIN (GLUCOPHAGE) 1000 MG tablet Take 1 tablet by mouth 2 (Two) Times a Day With Meals.   Unknown    nitroglycerin (NITROSTAT) 0.4 MG SL tablet Place 1 tablet under the tongue Every 5 (Five) Minutes As Needed for Chest Pain. Take no more than 3 doses in 15 minutes.   Unknown    omeprazole (priLOSEC) 20 MG capsule Take 1 capsule by mouth 2 (Two) Times a Day.   Unknown    predniSONE (DELTASONE) 20 MG tablet Take 2 tablets by mouth Daily. For 5 days   Unknown    rosuvastatin (CRESTOR) 40 MG tablet Take 1 tablet by mouth Daily.   Unknown    sertraline (ZOLOFT) 25 MG tablet Take 1 tablet by mouth Daily.   Unknown   , Scheduled Meds:  amLODIPine, 5 mg, Oral, Daily  apixaban, 5 mg, Oral, Q12H  arformoterol, 15 mcg, Nebulization, BID - RT  aspirin, 81 mg, Oral, Daily  budesonide, 0.5 mg, Nebulization, BID - RT  diphenhydrAMINE, 25 mg, Intravenous, Once  insulin lispro, 2-7 Units, Subcutaneous, 4x Daily PC & at Bedtime  ipratropium, 0.5 mg, Nebulization, 4x Daily - RT  isosorbide mononitrate, 60 mg, Oral, QAM  lisinopril, 20 mg, Oral, BID  metoprolol tartrate, 25 mg, Oral, Q12H  nicotine, 1 patch, Transdermal, Q24H  pantoprazole, 40 mg, Oral, Q AM  rosuvastatin, 40 mg, Oral, Daily  senna-docusate sodium, 2 tablet, Oral, BID  sodium chloride, 10 mL, Intravenous, Q12H   , Continuous Infusions:   , PRN Meds:    albuterol    senna-docusate sodium **AND** polyethylene glycol **AND** bisacodyl **AND** bisacodyl    Calcium Replacement - Follow Nurse / BPA Driven Protocol    Calcium Replacement -  Follow Nurse / BPA Driven Protocol    Calcium Replacement - Follow Nurse / BPA Driven Protocol    dextrose    dextrose    glucagon (human recombinant)    Magnesium Cardiology Dose Replacement - Follow Nurse / BPA Driven Protocol    Magnesium Standard Dose Replacement - Follow Nurse / BPA Driven Protocol    Magnesium Standard Dose Replacement - Follow Nurse / BPA Driven Protocol    nitroglycerin    Phosphorus Replacement - Follow Nurse / BPA Driven Protocol    Phosphorus Replacement - Follow Nurse / BPA Driven Protocol    Potassium Replacement - Follow Nurse / BPA Driven Protocol    Potassium Replacement - Follow Nurse / BPA Driven Protocol    sodium chloride    sodium chloride    sodium chloride, and Allergies:  Codeine    Objective     Vital Signs   Temp:  [97.7 °F (36.5 °C)-98.7 °F (37.1 °C)] 98.3 °F (36.8 °C)  Heart Rate:  [] 91  Resp:  [16-18] 16  BP: (143-166)/(65-89) 143/65    Mental Status Exam:  Hygiene:   fair  Cooperation:  Cooperative  Eye Contact:  Good  Psychomotor Behavior:  Appropriate  Affect:  Full range  Hopelessness: Denies  Speech:  Normal  Thought Progress:  Goal directed and Linear  Thought Content:  Normal  Suicidal:  None  Homicidal:  None  Hallucinations:  Auditory and Visual  Delusion:  None  Memory:  Deficits  Orientation:  Person, Place, and Situation  Reliability:  fair  Insight:  Fair  Judgement:  Fair  Impulse Control:  Fair    Results Review:   I reviewed the patient's new clinical results.  I reviewed the patient's other test results and agree with the interpretation  I personally viewed and interpreted the patient's EKG/Telemetry data  Lab Results (last 24 hours)       Procedure Component Value Units Date/Time    D-dimer, Quantitative [[id number]]  (Normal) Collected: 12/04/24 1250    Specimen: Blood Updated: 12/04/24 1329     D-Dimer, Quantitative 0.60 MCGFEU/mL     Narrative:      According to the assay 's published package insert, a normal (<0.50 MCGFEU/mL)  "D-dimer result in conjunction with a non-high clinical probability assessment, excludes deep vein thrombosis (DVT) and pulmonary embolism (PE) with high sensitivity.    D-dimer values increase with age and this can make VTE exclusion of an older population difficult. To address this, the [employer], based on best available evidence and recent guidelines, recommends that clinicians use age-adjusted D-dimer thresholds in patients greater than 50 years of age with: a) a low probability of PE who do not meet all Pulmonary Embolism Rule Out Criteria, or b) in those with intermediate probability of PE.   The formula for an age-adjusted D-dimer cut-off is \"age/100\".  For example, a 60 year old patient would have an age-adjusted cut-off of 0.60 MCGFEU/mL and an 80 year old 0.80 MCGFEU/mL.    POC Glucose Once [[id number]]  (Abnormal) Collected: 12/04/24 1105    Specimen: Blood Updated: 12/04/24 1115     Glucose 269 mg/dL     POC Glucose Once [[id number]]  (Abnormal) Collected: 12/04/24 0757    Specimen: Blood Updated: 12/04/24 0811     Glucose 155 mg/dL     Magnesium [[id number]]  (Normal) Collected: 12/04/24 0448    Specimen: Blood Updated: 12/04/24 0510     Magnesium 1.7 mg/dL     Phosphorus [[id number]]  (Normal) Collected: 12/04/24 0023    Specimen: Blood Updated: 12/04/24 0150     Phosphorus 3.1 mg/dL     Basic Metabolic Panel [[id number]]  (Abnormal) Collected: 12/04/24 0023    Specimen: Blood Updated: 12/04/24 0149     Glucose 195 mg/dL      BUN 16 mg/dL      Creatinine 0.85 mg/dL      Sodium 139 mmol/L      Potassium 3.7 mmol/L      Comment: Slight hemolysis detected by analyzer. Result may be falsely elevated.        Chloride 102 mmol/L      CO2 25.9 mmol/L      Calcium 7.7 mg/dL      BUN/Creatinine Ratio 18.8     Anion Gap 11.1 mmol/L      eGFR 75.7 mL/min/1.73     Narrative:      GFR Normal >60  Chronic Kidney Disease <60  Kidney Failure <15      Magnesium [[id number]]  (Normal) Collected: 12/04/24 " 0023    Specimen: Blood Updated: 12/04/24 0149     Magnesium 1.9 mg/dL     CBC & Differential [[id number]]  (Abnormal) Collected: 12/04/24 0023    Specimen: Blood Updated: 12/04/24 0116    Narrative:      The following orders were created for panel order CBC & Differential.  Procedure                               Abnormality         Status                     ---------                               -----------         ------                     CBC Auto Differential[[id number]]        Abnormal            Final result                 Please view results for these tests on the individual orders.    CBC Auto Differential [[id number]]  (Abnormal) Collected: 12/04/24 0023    Specimen: Blood Updated: 12/04/24 0116     WBC 13.02 10*3/mm3      RBC 3.97 10*6/mm3      Hemoglobin 12.0 g/dL      Hematocrit 38.4 %      MCV 96.7 fL      MCH 30.2 pg      MCHC 31.3 g/dL      RDW 13.2 %      RDW-SD 47.4 fl      MPV 10.4 fL      Platelets 237 10*3/mm3      Neutrophil % 66.9 %      Lymphocyte % 24.2 %      Monocyte % 7.0 %      Eosinophil % 0.9 %      Basophil % 0.5 %      Immature Grans % 0.5 %      Neutrophils, Absolute 8.70 10*3/mm3      Lymphocytes, Absolute 3.15 10*3/mm3      Monocytes, Absolute 0.91 10*3/mm3      Eosinophils, Absolute 0.12 10*3/mm3      Basophils, Absolute 0.07 10*3/mm3      Immature Grans, Absolute 0.07 10*3/mm3      nRBC 0.0 /100 WBC     POC Glucose Once [[id number]]  (Abnormal) Collected: 12/03/24 1927    Specimen: Blood Updated: 12/03/24 1933     Glucose 238 mg/dL     Urine Drug Screen - Urine, Clean Catch [[id number]]  (Normal) Collected: 12/03/24 1637    Specimen: Urine, Clean Catch Updated: 12/03/24 1652     THC, Screen, Urine Negative     Phencyclidine (PCP), Urine Negative     Cocaine Screen, Urine Negative     Methamphetamine, Ur Negative     Opiate Screen Negative     Amphetamine Screen, Urine Negative     Benzodiazepine Screen, Urine Negative     Tricyclic Antidepressants Screen Negative     Methadone  Screen, Urine Negative     Barbiturates Screen, Urine Negative     Oxycodone Screen, Urine Negative     Buprenorphine, Screen, Urine Negative    Narrative:      Cutoff For Drugs Screened:    Amphetamines               500 ng/ml  Barbiturates               200 ng/ml  Benzodiazepines            150 ng/ml  Cocaine                    150 ng/ml  Methadone                  200 ng/ml  Opiates                    100 ng/ml  Phencyclidine               25 ng/ml  THC                         50 ng/ml  Methamphetamine            500 ng/ml  Tricyclic Antidepressants  300 ng/ml  Oxycodone                  100 ng/ml  Buprenorphine               10 ng/ml    The normal value for all drugs tested is negative. This report includes unconfirmed screening results, with the cutoff values listed, to be used for medical treatment purposes only.  Unconfirmed results must not be used for non-medical purposes such as employment or legal testing.  Clinical consideration should be applied to any drug of abuse test, particularly when unconfirmed results are used.      Fentanyl, Urine - Urine, Clean Catch [[id number]]  (Normal) Collected: 12/03/24 1637    Specimen: Urine, Clean Catch Updated: 12/03/24 1651     Fentanyl, Urine Negative    Narrative:      Negative Threshold:      Fentanyl 5 ng/mL     The normal value for the drug tested is negative. This report includes final unconfirmed screening results to be used for medical treatment purposes only. Unconfirmed results must not be used for non-medical purposes such as employment or legal testing. Clinical consideration should be applied to any drug of abuse test, particularly when unconfirmed results are used.           Blood Gas, Arterial With Co-Ox [[id number]]  (Abnormal) Collected: 12/03/24 1617    Specimen: Arterial Blood Updated: 12/03/24 1622     Site Right Brachial     Brock's Test N/A     pH, Arterial 7.482 pH units      Comment: 83 Value above reference range        pCO2, Arterial 43.5 mm  Hg      pO2, Arterial 88.7 mm Hg      HCO3, Arterial 32.5 mmol/L      Comment: 83 Value above reference range        Base Excess, Arterial 8.1 mmol/L      O2 Saturation, Arterial 97.6 %      Hemoglobin, Blood Gas 12.3 g/dL      Comment: 84 Value below reference range        Hematocrit, Blood Gas 37.9 %      Comment: 84 Value below reference range        Oxyhemoglobin 95.7 %      Methemoglobin 0.20 %      Carboxyhemoglobin 1.7 %      A-a DO2 55.6 mmHg      CO2 Content 33.9 mmol/L      Barometric Pressure for Blood Gas 736 mmHg      Modality Nasal Cannula     FIO2 28 %      Flow Rate 2.0 lpm      Ventilator Mode NA     Collected by [id number]     Comment: Meter: [id number]     :  [id number]        pH, Temp Corrected --     pCO2, Temperature Corrected --     pO2, Temperature Corrected --    POC Glucose Once [[id number]]  (Abnormal) Collected: 12/03/24 1609    Specimen: Blood Updated: 12/03/24 1617     Glucose 165 mg/dL     Magnesium [[id number]]  (Normal) Collected: 12/03/24 1433    Specimen: Blood Updated: 12/03/24 1511     Magnesium 2.0 mg/dL     Potassium [[id number]]  (Normal) Collected: 12/03/24 1433    Specimen: Blood Updated: 12/03/24 1504     Potassium 3.7 mmol/L     Blood Culture - Blood, Arm, Right [[id number]]  (Normal) Collected: 12/02/24 1418    Specimen: Blood from Arm, Right Updated: 12/03/24 1430     Blood Culture No growth at 24 hours    Blood Culture - Blood, Hand, Right [[id number]]  (Normal) Collected: 12/02/24 1418    Specimen: Blood from Hand, Right Updated: 12/03/24 1430     Blood Culture No growth at 24 hours          Imaging Results (Last 24 Hours)       ** No results found for the last 24 hours. **              Assessment & Plan     Principal Problem:    Vertigo     Visual hallucinations  -Symptoms do not appear psychiatric in origin.  No significant psychiatric history reported by patient or family.  Unclear why patient was taking sertraline; agree with discontinuation.  -Visual  hallucinations are generally seen in alcohol withdrawal, delirium and dementia.  Less common etiologies include migraines, seizures, autoimmune/neurodegenerative disease, stroke.  Of note, patient has an old right occipital lesion which could be playing a part.  -Would continue workup with outpatient neurology  -Would refer for ophthalmology assessment, consider Tim Bonnet syndrome    I discussed the patient's findings and my recommendations with patient, family, and consulting provider    João Mckinley MD  12/04/24  13:48 EST

## 2024-12-05 ENCOUNTER — READMISSION MANAGEMENT (OUTPATIENT)
Dept: CALL CENTER | Facility: HOSPITAL | Age: 69
End: 2024-12-05
Payer: MEDICARE

## 2024-12-05 VITALS
BODY MASS INDEX: 35.41 KG/M2 | TEMPERATURE: 98.7 F | DIASTOLIC BLOOD PRESSURE: 89 MMHG | WEIGHT: 225.6 LBS | SYSTOLIC BLOOD PRESSURE: 155 MMHG | HEIGHT: 67 IN | HEART RATE: 96 BPM | RESPIRATION RATE: 18 BRPM | OXYGEN SATURATION: 97 %

## 2024-12-05 LAB — GLUCOSE BLDC GLUCOMTR-MCNC: 160 MG/DL (ref 70–130)

## 2024-12-05 PROCEDURE — 99232 SBSQ HOSP IP/OBS MODERATE 35: CPT | Performed by: NURSE PRACTITIONER

## 2024-12-05 PROCEDURE — 63710000001 INSULIN LISPRO (HUMAN) PER 5 UNITS: Performed by: HOSPITALIST

## 2024-12-05 PROCEDURE — 94799 UNLISTED PULMONARY SVC/PX: CPT

## 2024-12-05 PROCEDURE — 82948 REAGENT STRIP/BLOOD GLUCOSE: CPT

## 2024-12-05 PROCEDURE — 94664 DEMO&/EVAL PT USE INHALER: CPT

## 2024-12-05 PROCEDURE — 94761 N-INVAS EAR/PLS OXIMETRY MLT: CPT

## 2024-12-05 RX ORDER — BUDESONIDE AND FORMOTEROL FUMARATE DIHYDRATE 160; 4.5 UG/1; UG/1
2 AEROSOL RESPIRATORY (INHALATION)
Qty: 1 EACH | Refills: 12 | Status: SHIPPED | OUTPATIENT
Start: 2024-12-05

## 2024-12-05 RX ORDER — ASPIRIN 81 MG/1
81 TABLET, CHEWABLE ORAL DAILY
Qty: 30 TABLET | Refills: 3 | Status: SHIPPED | OUTPATIENT
Start: 2024-12-06

## 2024-12-05 RX ORDER — AMLODIPINE BESYLATE 5 MG/1
5 TABLET ORAL DAILY
Qty: 30 TABLET | Refills: 3 | Status: SHIPPED | OUTPATIENT
Start: 2024-12-06

## 2024-12-05 RX ADMIN — ASPIRIN 81 MG: 81 TABLET, CHEWABLE ORAL at 09:29

## 2024-12-05 RX ADMIN — IPRATROPIUM BROMIDE 0.5 MG: 0.5 SOLUTION RESPIRATORY (INHALATION) at 07:10

## 2024-12-05 RX ADMIN — ROSUVASTATIN 40 MG: 20 TABLET, FILM COATED ORAL at 09:29

## 2024-12-05 RX ADMIN — SENNOSIDES AND DOCUSATE SODIUM 2 TABLET: 50; 8.6 TABLET ORAL at 09:31

## 2024-12-05 RX ADMIN — METOPROLOL TARTRATE 25 MG: 25 TABLET, FILM COATED ORAL at 09:30

## 2024-12-05 RX ADMIN — APIXABAN 5 MG: 5 TABLET, FILM COATED ORAL at 09:29

## 2024-12-05 RX ADMIN — Medication 10 ML: at 09:30

## 2024-12-05 RX ADMIN — ARFORMOTEROL TARTRATE 15 MCG: 15 SOLUTION RESPIRATORY (INHALATION) at 07:10

## 2024-12-05 RX ADMIN — AMLODIPINE BESYLATE 5 MG: 5 TABLET ORAL at 09:30

## 2024-12-05 RX ADMIN — LISINOPRIL 20 MG: 10 TABLET ORAL at 09:30

## 2024-12-05 RX ADMIN — BUDESONIDE 0.5 MG: 0.5 SUSPENSION RESPIRATORY (INHALATION) at 07:10

## 2024-12-05 RX ADMIN — ISOSORBIDE MONONITRATE 60 MG: 60 TABLET, EXTENDED RELEASE ORAL at 06:22

## 2024-12-05 RX ADMIN — PANTOPRAZOLE SODIUM 40 MG: 40 TABLET, DELAYED RELEASE ORAL at 06:21

## 2024-12-05 NOTE — NURSING NOTE
"At 2200 staff responded to pt's bed alarm going off. Upon entering pt's room pt was tearful and agitated at staff. Pt stated to \"leave me alone\". Pt then proceeded to exit their room and walked down the [address]. Family/spouse was then contacted by lead RN. Pt stated she was leaving and going home. When staff attempted to help the pt ambulate (do to fall risk) pt began to attempt to hit and threaten staff. Security was then called and arrived in hallway with pt and nursing staff present.     At 2215 pt was being redirected to room and house supervisor was contacted (at bedside). Pt's gait then became unsteady, when trying to help pt the pt became combative (brief hold started at 2215) and attempted to bite staff.     At 2220 Roshan ANG made aware of situation, see orders. Medication was then administered at 2230 and brief hold ended. Family then arrived at beside and also made aware of situation.   "

## 2024-12-05 NOTE — CASE MANAGEMENT/SOCIAL WORK
Discharge Planning Assessment   [place]     Patient Name: Leisa Vasquez  MRN: [id number]  Today's Date: 12/5/2024    Admit Date: 12/2/2024    Plan: SS noted Psych evaluation and pt confusion.  SS will continue to follow and assist with discharge plans.       Discharge Plan       Row Name 12/05/24 0843       Plan    Plan SS noted Psych evaluation and pt confusion.  SS will continue to follow and assist with discharge plans.                  Continued Care and Services - Admitted Since 12/2/2024    No active coordination exists for this encounter.       Expected Discharge Date and Time       Expected Discharge Date Expected Discharge Time    Dec 4, 2024           DAVIDA RuedaW

## 2024-12-05 NOTE — NURSING NOTE
Violent or Self-Destructive Behavioral Restraint Provider Face to Face       Patient Name: Leisa Vasquez  : 1955  MRN: [id number]    Date of Assessment: 24    Face to Face Evaluation:   I have completed a face to face evaluation of the patient for dangerous or threatening behavior within 1 hour of the restraint placement.     Time restraints were placed:     Time patient was evaluated:     Assessment of Patient's Immediate Situation: aggressive    Type of intervention: Physical restraint (holding)    Assessment of Patient's Reaction to Intervention: behaviors have lessened but are still present    Evaluation of Patient's Medical and Behavioral Conditions: Confusion      I have reviewed all available relevant medical and behavioral history for the patient including:     Complete Review of Systems: Yes  Review of Patient's Medical History: Yes  Drugs and/or Alcohol: No  Medications: Yes  Recent Labs and Diagnostics: Yes  Allergies: Yes    Restraints/Seclusion Continued or Discontinued: Discontinue    Discontinued Indications: Janett Hunter RN  24   2330 EST

## 2024-12-05 NOTE — NURSING NOTE
1 Hour face to face completed following the brief hold the began at 2215 and ended at 2230. Patient is now resting in bed sleeping with no more signs of agitation or aggression noted. Reported findings to Cecelia ANG and the need for a brief hold to end was met per Cecelia ANG.

## 2024-12-05 NOTE — PLAN OF CARE
Goal Outcome Evaluation:      Pt has been agitated and restless this shift, see previous note. Family at bedside with pt at this time. No signs and symptoms of acute distress noted. No complaints of chest pain or SOB reported.

## 2024-12-05 NOTE — DISCHARGE SUMMARY
[facility] MEDICINE DISCHARGE SUMMARY    Patient Identification:  Name:  Leisa Vasquez  Age:  68 y.o.  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 with left visual cut, incidental finding of right occipital stroke appears old associated with left visual cut, visual hallucination likely from above with left visual cut, severe electrolyte derangement including severe hypomagnesemia hypokalemia and hypocalcemia, chronic paroxysmal atrial fibrillation rate controlled CHADS-VASc score of 7, history of COPD without exacerbation, diabetes with hyperglycemia improved with diet suspect noncompliance at home, obesity with a BMI of 38 complicating all aspects of care, history of PE on chronic anticoagulation, chronic hypoxic respiratory failure on continuous oxygen supplementation at home 2 L, tobacco use disorder patient counseled the need for cessation, history of essential hypertension, history of hyperlipidemia.  Nonobstructive carotid artery disease noted on CT angio of the carotids    HOSPITAL COURSE  For detailed history and physical exam I would refer you to my admission note that was done on December 3, 2024, patient presented emergency room with severe dizziness nausea and vomiting poor intake secondary to dizziness, initially patient's history was limited due to patient's patient limited history due to her symptoms.   did report similar symptoms 3 weeks prior but self-limited.  CT scan of the brain without contrast was unremarkable, she was noted to have atrial fibrillation but rate controlled, she is not aware of any history of A-fib in the past.  Nonetheless patient was already chronically anticoagulated  for history of PE.  She was noted to have significant hypomagnesemia hypokalemia and hypocalcemia.  This was aggressively corrected, gentle hydration, PT OT was consulted, neurology was also consulted after ordering MRI.   MRI was done noted old right chronic occipital stroke.  Further inquiry from the patient and she reported she had episode of headache which CT angio of the neck and brain was ordered, noted nonobstructive carotid artery disease.  Is a 68 y.o. female presented to [facility] complaining of ***.  Please see the admitting history and physical for further details.      VITAL SIGNS:      12/03/24  0500 12/04/24  0500 12/05/24  0554   Weight: 101 kg (223 lb 5.2 oz) (new admit less than 24 hours) 101 kg (222 lb 10.6 oz) 102 kg (225 lb 9.6 oz)     Body mass index is 35.33 kg/m².  Vitals:    12/05/24 0749   BP: 155/89   Pulse: 96   Resp: 18   Temp: 98.7 °F (37.1 °C)   SpO2: 93%     PHYSICAL EXAM:  General: Comfortable,awake, alert, oriented to self, place, and time, well-developed and well-nourished.  No respiratory distress.    Skin:  Skin is warm and dry. No rash noted. No pallor.    HENT:  Head:  Normocephalic and atraumatic.  Mouth:  Moist mucous membranes.    Eyes:  Conjunctivae and EOM are normal.  Pupils are equal, round, and reactive to light.  No scleral icterus.    Neck:  Neck supple.  No JVD present.    Pulmonary/Chest:  No respiratory distress, no wheezes, no crackles, with normal breath sounds and good air movement.  Cardiovascular:  Normal rate, regular rhythm and normal heart sounds with no murmur.  Abdominal:  Soft.  Bowel sounds are normal.  No distension and no tenderness.   Extremities:  No edema, no tenderness, and no deformity.  No red or swollen joints anywhere.  Strong pulses in all 4 extremities with no clubbing, no cyanosis, no edema.  Neurological:  Motor strength equal no obvious deficit, sensory grossly intact.   No cranial nerve deficit.  No tongue deviation.  No  facial droop.  No slurred speech.    Genitourinary:   Back:  ----------    DISCHARGE MEDICATIONS:     Discharge Medications        New Medications        Instructions Start Date   budesonide-formoterol 160-4.5 MCG/ACT inhaler  Commonly known as: Symbicort   2 puffs, Inhalation, 2 Times Daily - RT             Changes to Medications        Instructions Start Date   amLODIPine 5 MG tablet  Commonly known as: NORVASC  What changed:   medication strength  how much to take   5 mg, Oral, Daily   Start Date: December 6, 2024            Continue These Medications        Instructions Start Date   aspirin 81 MG chewable tablet   81 mg, Oral, Daily   Start Date: December 6, 2024     carvedilol 25 MG tablet  Commonly known as: COREG   25 mg, 2 Times Daily With Meals      Eliquis 5 MG tablet tablet  Generic drug: apixaban   5 mg, Oral, Every 12 Hours Scheduled      isosorbide mononitrate 60 MG 24 hr tablet  Commonly known as: IMDUR   60 mg, Oral, Every Morning      levalbuterol 0.63 MG/3ML nebulizer solution  Commonly known as: XOPENEX   1 ampule, Nebulization, Every 4 Hours PRN      lisinopril 20 MG tablet  Commonly known as: PRINIVIL,ZESTRIL   20 mg, Oral, 2 Times Daily      metFORMIN 1000 MG tablet  Commonly known as: GLUCOPHAGE   1,000 mg, Oral, 2 Times Daily With Meals      nitroglycerin 0.4 MG SL tablet  Commonly known as: NITROSTAT   0.4 mg, Sublingual, Every 5 Minutes PRN, Take no more than 3 doses in 15 minutes.       omeprazole 20 MG capsule  Commonly known as: priLOSEC   20 mg, Oral, 2 Times Daily      rosuvastatin 40 MG tablet  Commonly known as: CRESTOR   40 mg, Oral, Daily      Zetia 10 MG tablet  Generic drug: ezetimibe   10 mg, Oral, Daily             Stop These Medications      doxycycline 100 MG capsule  Commonly known as: VIBRAMYCIN     predniSONE 20 MG tablet  Commonly known as: DELTASONE     sertraline 25 MG tablet  Commonly known as: ZOLOFT                    Additional Instructions for the Follow-ups that  You Need to Schedule       Ambulatory Referral to Home Health   As directed      Face to Face Visit Date: 12/5/2024   Follow-up provider for Plan of Care?: I treated the patient in an acute care facility and will not continue treatment after discharge.   Follow-up provider: ROSARIO WATTERS [[id number]]   Reason/Clinical Findings: Patient has unsteady gait, noted chronic occipital stroke with left visual cut, needs PT OT and home safety evaluation   Describe mobility limitations that make leaving home difficult: Patient has left visual cut from previous CVA, needs company to leave the house   Nursing/Therapeutic Services Requested: Skilled Nursing Physical Therapy Occupational Therapy   Skilled nursing orders: Medication education   PT orders: Gait Training Transfer training Home safety assessment   Weight Bearing Status: As Tolerated   Occupational orders: Cognition Home safety assessment Strengthening Energy conservation   Frequency: 1 Week 1        Discharge Follow-up with PCP   As directed       Currently Documented PCP:    Rosario Watters MD    PCP Phone Number:    [phone]     Follow Up Details: Rosario Watters MD (posthospital follow-up, repeat MRI of the brain in 3 months)               Follow-up Information       Rosario Watters MD .    Specialty: Family Medicine  Why: Rosario Watters MD (posthospital follow-up, repeat MRI of the brain in 3 months)  Contact information:  [address] [facility] [address] [phone]                             Pending Labs       Order Current Status    Blood Culture - Blood, Arm, Right Preliminary result    Blood Culture - Blood, Hand, Right Preliminary result             Brigida Du MD  12/05/24  11:50 EST    Please note that this discharge summary required more than 30 minutes to complete.     information:  [address] [facility] [address]  [phone]                             Pending Labs       Order Current Status    Blood Culture - Blood, Arm, Right Preliminary result    Blood Culture - Blood, Hand, Right Preliminary result             Brigida Du MD  12/05/24  11:50 EST    Please note that this discharge summary required more than 30 minutes to complete.

## 2024-12-05 NOTE — PROGRESS NOTES
Neurology Progress Note       Chief Complaint:  Headaches/Dizziness/Hallucinations    Subjective    Subjective     Subjective:  Patient again last night had an episode of becoming very agitated, threatening to leave the hospital AMA.  Security had to be called and patient was escorted back to her room, given as needed medications and calm down.  On exam patient is alert and oriented, describing the events of last night, but states she is feeling significantly more calm now.  We explained to her that her hallucinations were likely due to the lesion in her brain that was caused by prior stroke at some point, the exact timing cannot be verified.  Psychiatry was consulted, please see their note for further details, they felt her hallucinations were possibly related to her prior stroke as there was no evidence for psychiatric cause of her symptoms.  She denies any sudden severe headache, reports a very mild 3-4/10 headache overnight.     Review of Systems   Constitutional:  Negative for chills and fever.   Eyes:  Positive for visual disturbance (Left visual field cut).   Respiratory: Negative.     Cardiovascular: Negative.    Skin: Negative.    Neurological:  Negative for seizures, speech difficulty, weakness and headaches.   Psychiatric/Behavioral:  Positive for behavioral problems, confusion and hallucinations. The patient is nervous/anxious.          Objective    Objective      Temp:  [97.9 °F (36.6 °C)-98.7 °F (37.1 °C)] 98.7 °F (37.1 °C)  Heart Rate:  [84-99] 96  Resp:  [18-20] 18  BP: (147-165)/(73-96) 155/89    Neurological Exam  Mental Status  Awake and alert. Oriented only to person, place and situation. Speech is normal. Language is fluent with no aphasia. Attention and concentration are normal.    Cranial Nerves  CN II: Left visual field cut.  CN III, IV, VI: Extraocular movements intact bilaterally. Normal lids and orbits bilaterally.  CN VII: Full and symmetric facial movement.  CN XII: Tongue midline  without atrophy or fasciculations.    Motor  Normal muscle bulk throughout. Normal muscle tone. No abnormal involuntary movements.  Moving extremities equally, no focal motor deficit.    Coordination    No obvious dysmetria.    Gait    Not assessed.      Physical Exam  Vitals and nursing note reviewed.   Constitutional:       General: She is awake.      Appearance: She is obese.   HENT:      Head: Normocephalic.      Mouth/Throat:      Pharynx: Oropharynx is clear.   Eyes:      General: Lids are normal.      Extraocular Movements: Extraocular movements intact.   Cardiovascular:      Rate and Rhythm: Normal rate.   Pulmonary:      Effort: Pulmonary effort is normal. No respiratory distress.   Skin:     General: Skin is warm and dry.   Neurological:      Mental Status: She is alert.   Psychiatric:         Mood and Affect: Mood normal.         Speech: Speech normal.         Behavior: Behavior normal.         Hospital Meds:  Scheduled- amLODIPine, 5 mg, Oral, Daily  apixaban, 5 mg, Oral, Q12H  arformoterol, 15 mcg, Nebulization, BID - RT  aspirin, 81 mg, Oral, Daily  budesonide, 0.5 mg, Nebulization, BID - RT  diphenhydrAMINE, 25 mg, Intravenous, Once  insulin lispro, 2-7 Units, Subcutaneous, 4x Daily PC & at Bedtime  ipratropium, 0.5 mg, Nebulization, 4x Daily - RT  isosorbide mononitrate, 60 mg, Oral, QAM  lisinopril, 20 mg, Oral, BID  metoprolol tartrate, 25 mg, Oral, Q12H  nicotine, 1 patch, Transdermal, Q24H  pantoprazole, 40 mg, Oral, Q AM  rosuvastatin, 40 mg, Oral, Daily  senna-docusate sodium, 2 tablet, Oral, BID  sodium chloride, 10 mL, Intravenous, Q12H      Infusions-       PRNs-   albuterol    senna-docusate sodium **AND** polyethylene glycol **AND** bisacodyl **AND** bisacodyl    Calcium Replacement - Follow Nurse / BPA Driven Protocol    Calcium Replacement - Follow Nurse / BPA Driven Protocol    Calcium Replacement - Follow Nurse / BPA Driven Protocol    dextrose    dextrose    glucagon (human  recombinant)    Magnesium Cardiology Dose Replacement - Follow Nurse / BPA Driven Protocol    Magnesium Standard Dose Replacement - Follow Nurse / BPA Driven Protocol    Magnesium Standard Dose Replacement - Follow Nurse / BPA Driven Protocol    nitroglycerin    Phosphorus Replacement - Follow Nurse / BPA Driven Protocol    Phosphorus Replacement - Follow Nurse / BPA Driven Protocol    Potassium Replacement - Follow Nurse / BPA Driven Protocol    Potassium Replacement - Follow Nurse / BPA Driven Protocol    sodium chloride    sodium chloride    sodium chloride    Results Review:    I reviewed the patient's new clinical results.    Imaging Results (Last 24 Hours)       ** No results found for the last 24 hours. **          Results for orders placed during the hospital encounter of 12/02/24    Adult Transthoracic Echo Complete W/ Cont if Necessary Per Protocol    Interpretation Summary    Left ventricular systolic function is normal. Left ventricular ejection fraction appears to be 56 - 60%.    Left ventricular diastolic function is consistent with (grade Ia w/high LAP) impaired relaxation.    The left atrial cavity is mildly dilated.    The right atrial cavity is mildly  dilated.    Estimated right ventricular systolic pressure from tricuspid regurgitation is normal (<35 mmHg).    MRI Angiogram Venogram Head    Result Date: 12/3/2024  Unremarkable MRV of the brain.    This report was finalized on 12/3/2024 10:38 AM by Jackie More M.D..      CT Angiogram Carotids    Result Date: 12/3/2024  1. Calcific and soft plaque in the right carotid bulb and proximal ICA producing a 60% stenosis. 2. Calcific plaque in the left carotid bulb and proximal ICA producing a 40% stenosis. 3. No hemodynamically significant stenosis in the intracranial vasculature. 4. Partially imaged right pleural effusion.       This report was finalized on 12/3/2024 9:46 AM by Jackie More M.D..      CT Angiogram Head    Result Date:  12/3/2024  1. Calcific and soft plaque in the right carotid bulb and proximal ICA producing a 60% stenosis. 2. Calcific plaque in the left carotid bulb and proximal ICA producing a 40% stenosis. 3. No hemodynamically significant stenosis in the intracranial vasculature. 4. Partially imaged right pleural effusion.       This report was finalized on 12/3/2024 9:46 AM by Jackie More M.D..      MRI Brain Without Contrast    Result Date: 12/2/2024  Impression: 1. Abnormal T2 signal as described, correlate clinically to exclude osmotic demyelination syndrome. 2. Abnormal gradient echo hypointensity left lateral ventricle, compare with prior imaging or further evaluation with CT recommended.  This report was finalized on 12/2/2024 9:54 PM by Hiram Bird DO.      XR Chest 1 View    Result Date: 12/2/2024  No acute cardiopulmonary process.   This report was finalized on 12/2/2024 3:29 PM by Jackie More M.D..      CT Head Without Contrast    Result Date: 12/2/2024  No acute intracranial process.     This report was finalized on 12/2/2024 3:04 PM by Jackie More M.D..      CT Abdomen Pelvis Without Contrast    Result Date: 12/2/2024  No hydronephrosis or nephrolithiasis.     This report was finalized on 12/2/2024 2:57 PM by Jackie More M.D..        Lab Results   Component Value Date    HGBA1C 8.00 (H) 01/28/2022      Lab Results   Component Value Date    CHOL 202 (H) 01/28/2022    TRIG 112 01/28/2022    HDL 43 01/28/2022     (H) 01/28/2022      Lab Results   Component Value Date    WBC 13.02 (H) 12/04/2024    HGB 12.0 12/04/2024    HCT 38.4 12/04/2024    MCV 96.7 12/04/2024     12/04/2024      Lab Results   Component Value Date    GLUCOSE 195 (H) 12/04/2024    BUN 16 12/04/2024    CREATININE 0.85 12/04/2024    EGFRIFNONA 64 01/28/2022    BCR 18.8 12/04/2024    K 3.7 12/04/2024    CO2 25.9 12/04/2024    CALCIUM 7.7 (L) 12/04/2024    ALBUMIN 3.4 (L) 12/02/2024    AST 15 12/02/2024     ALT 17 12/02/2024      Lab Results   Component Value Date    TSH 1.890 01/28/2022     Lab Results   Component Value Date    FFLAFDKO48 298 12/03/2024     Lab Results   Component Value Date    FOLATE 7.96 12/03/2024             Assessment/Plan     Assessment/Plan:  68-year-old female with history of DVT/PE, recently diagnosed A-fib anticoagulated with Eliquis, hypertension, obesity, tobacco abuse, diabetes, chronic occipital stroke and hyperlipidemia presented to the ED altered had been complaining of severe headache and dizziness with any body movement, also complaining of diarrhea for the past 3 to 4 days.  Had noticed frequent falling, hallucinations, running into things for the past several months.      No acute abnormalities on initial CT head.  MRI brain showed chronic infarct in right occipital lobe as well as abnormal T2 signal in central mony which spares the peripheral fibers.  CTA head and neck reported 60% stenosis in proximal right ICA, 40% in left.  Also noted on CTA were some narrowing in bilateral MCAs.  The MRV was unremarkable.     #Headache with dizziness, nausea/vomiting, improving.  Less concern for RCVS although we did note narrowing in bilateral MCAs. Patient has not had any more headaches since admission.  MRV was unremarkable  #History of DVT/PE/A-fib anticoagulated with Eliquis  #History of right occipital stroke  #Current everyday smoker  #Hallucinations, likely related to stroke lesion and right occipital lobe          -Recommend repeating MRI brain in 3 months due to concern for osmotic demyelination syndrome raised by abnormal T2 signal in central mony  -Normal blood pressure goals  -Okay to continue Eliquis, also on aspirin for CAD  -Recommend sertraline taper as sertraline is a risk factor for RCVS  -Smoking cessation education  -Delirium precautions: Lights on, shades open from 0700 to 1900 daily with frequent reorientation. Typical risk factors for delirium include advanced age,  premorbid neurological disease, significant impairment in hearing or vision, critical illness, or prolonged hospital admission  -No further neurological recommendations  -Discussed with patient when to return including sudden severe headache, dizziness, or fear from hallucinations.  Explained to patient that the hallucinations may persist and to follow-up with behavioral therapy if needed     The case was discussed with the patient, and Dr. Du.  Tele-Neurology will sign off for now, please feel free to call with any questions/concerns.  Thank again for the consult.    Peter Purcell, BENJI  12/05/24  11:06 EST    This was an audio and video enabled telemedicine encounter.  Dr. Munoz present for encounter via telemedicine.  Verbal consent taken.

## 2024-12-05 NOTE — NURSING NOTE
Patient A&Ox4. Patient and spouse states they are not staying any longer and waiting on oxygen. I educated the patient and spouse on risks of leaving without oxygen including death. Pt and spouse verbalized understanding.   Dr. Du made aware.

## 2024-12-05 NOTE — PLAN OF CARE
Goal Outcome Evaluation:         Pt to be discharged on this date. IV removed. No tele present.

## 2024-12-05 NOTE — DISCHARGE PLACEMENT REQUEST
"Leisa Vasquez (68 y.o. Female)       Date of Birth   1955    Social Security Number       Address   [address] [id number]    Home Phone   [phone]    MRN   [id number]       Mormonism   Unknown    Marital Status                               Admission Date   24    Admission Type   Emergency    Admitting Provider   Brigida Du MD    Attending Provider   Brigida Du MD    Department, Room/Bed   [address], 3318/1P       Discharge Date       [place] [facility] [place]                                 Attending Provider: Brigida Du MD    Allergies: Codeine    Isolation: None   Infection: None   Code Status: CPR    Ht: 170.2 cm (67\")   Wt: 102 kg (225 lb 9.6 oz)    Admission Cmt: None   Principal Problem: Vertigo [R42]                   Active Insurance as of 2024       Primary Coverage       Payor Plan Insurance Group Employer/Plan Group    [facility] MEDICARE REPLACEMENT WELLCARE MED ADV HMO        Payor Plan Address Payor Plan Phone Number Payor Plan Fax Number Effective Dates    [address] [address]   2024 - None Entered    [facility] [address]         Subscriber Name Subscriber Birth Date Member ID       LEISA VASQUEZ 1955 [id number]                     Emergency Contacts        (Rel.) Home Phone Work Phone Mobile Phone    EFE MARTINEZ (Friend) [phone] -- --    SHANTAL MCLAIN (Son) [phone] -- --          [address] [address]  Phone:  [phone]  Fax:  [phone] Date: Dec 5, 2024      Ambulatory Referral to Home Health     Patient:  Leisa Vasquez MRN:  [id number]   [address] [facility] [address] :  1955  SSN:    Phone: [phone] Sex:  F      INSURANCE PAYOR PLAN GROUP # SUBSCRIBER ID   Primary:    [facility] MEDICARE REPLACEMENT [id number]   [id number]    "   Referring Provider Information:  MURIEL ROGERS Phone: [phone] Fax: [phone]       Referral Information:   # Visits:  999 Referral Type: Home Health [42]   Urgency:  Routine Referral Reason: Specialty Services Required   Start Date: Dec 5, 2024 End Date:  To be determined by Insurer   Diagnosis: Vertigo (R42 [ICD-10-CM] 780.4 [ICD-9-CM])  Unsteady gait when walking (R26.81 [ICD-10-CM] 781.2 [ICD-9-CM])  Cerebrovascular accident (CVA) due to occlusion (I63.9 [ICD-10-CM] 434.91 [ICD-9-CM])      Refer to Dept:   Refer to Provider:   Refer to Provider Phone:   Refer to Facility:       Face to Face Visit Date: 12/5/2024  Follow-up provider for Plan of Care? I treated the patient in an acute care facility and will not continue treatment after discharge.  Follow-up provider: ROSARIO WATTERS [[id number]]  Reason/Clinical Findings: Patient has unsteady gait, noted chronic occipital stroke with left visual cut, needs PT OT and home safety evaluation  Describe mobility limitations that make leaving home difficult: Patient has left visual cut from previous CVA, needs company to leave the house  Nursing/Therapeutic Services Requested: Skilled Nursing  Nursing/Therapeutic Services Requested: Physical Therapy  Nursing/Therapeutic Services Requested: Occupational Therapy  Skilled nursing orders: Medication education  PT orders: Gait Training  PT orders: Transfer training  PT orders: Home safety assessment  Weight Bearing Status: As Tolerated  Occupational orders: Cognition  Occupational orders: Home safety assessment  Occupational orders: Strengthening  Occupational orders: Energy conservation  Frequency: 1 Week 1     This document serves as a request of services and does not constitute Insurance authorization or approval of services.  To determine eligibility, please contact the members Insurance carrier to verify and review coverage.     If you have medical questions regarding this request for services. Please contact  [address] at [phone] during normal business hours.        Authorizing Provider:Brigida Du MD  Authorizing Provider's NPI: [id number]  Order Entered By: Brigida Du MD 2024 11:50 AM     Electronically signed by: Brigida Du MD 2024 11:50 AM         Insurance Information                  [facility]WELLCARE MED ADV HMO Phone: --    Subscriber: Leisa Vasquez Subscriber#: [id number]    Group#: -- Precert#: --    Authorization#: -- Effective Date: --             History & Physical        Brigida Du MD at 24 1733              [facility] HISTORY AND PHYSICAL    Patient Identification:  Name:  Leisa Vasquez  Age:  68 y.o.  Sex:  female  :  1955  MRN:  [id number]   Visit Number:  [id number]  Admit Date: 2024   Room number:  110/10  Primary Care Physician:  Leisa Balderas MD     Chief complaint:    Chief Complaint   Patient presents with    Vomiting       History of presenting illness:  68 y.o. female with history significant for COPD continued tobacco use history of PE on chronic anticoagulation presented to emergency room with chief complaints of severe dizziness.  As per wife supplemented by  who lives with the patient dizziness started 3 days ago severe, she cannot describe whether it subjective or objective type, no tinnitus.  Any slight movement of her head causes her to have severe dizziness followed by nausea and vomiting.  She denies recent rhinorrhea URI symptoms sneezing or coughing.  She denies tinnitus.  Due to persistent symptoms she decided to come to the emergency room.  At the emergency room she was noted to have atrial fibrillation but rate controlled, she denies history of cardiac arrhythmia.  stated similar symptoms occurred 3 weeks ago but was self-limited.    Of note a week ago patient was seen by her primary care provider,  she was very  hypoxic, she was advised to get admitted but patient refused.      Today at the emergency room her O2 sat was 95 to 96% on 2 L nasal cannula.  ER provider noted she has horizontal nystagmus bilateral, she received a dose of Ativan IV which relieved nystagmus but dizziness continued.    ---------------------------------------------------------------------------------------------------------------------   Review of Systems   Constitutional:  Positive for appetite change. Negative for activity change, chills, diaphoresis, fatigue, fever and unexpected weight change.   HENT: Negative.     Eyes: Negative.    Respiratory: Negative.     Cardiovascular: Negative.    Gastrointestinal:  Positive for nausea and vomiting. Negative for diarrhea.   Endocrine: Negative.    Genitourinary: Negative.    Musculoskeletal: Negative.    Skin: Negative.    Allergic/Immunologic: Negative.    Neurological:  Positive for dizziness. Negative for tremors, seizures, syncope, speech difficulty, weakness, light-headedness, numbness and headaches.   Hematological: Negative.    Psychiatric/Behavioral:  Positive for decreased concentration.        ---------------------------------------------------------------------------------------------------------------------   Past Medical History:   Diagnosis Date    Arthritis     COPD (chronic obstructive pulmonary disease)     Coronary artery disease s/p statnting int he past 12/22/2020    Diabetes     Diabetes mellitus     Essential hypertension 12/22/2020    Heart & renal disease, hypertensive malignant with heart failure     Heart attack     History of bronchitis     Hyperlipidemia 12/22/2020    Low back pain     Obesity (BMI 30-39.9) 12/22/2020    Pneumonia     Pulmonary embolism      Past Surgical History:   Procedure Laterality Date    CHOLECYSTECTOMY      CORONARY STENT PLACEMENT  02/08/2013    3.0x23mm xieuce mid RCA done at ; LAD [id number] Ref #[id number] done [facility] 12/29/12     HYSTERECTOMY       Family History   Problem Relation Age of Onset    Cancer Mother     No Known Problems Father      Social History     Socioeconomic History    Marital status:    Tobacco Use    Smoking status: Every Day     Current packs/day: 0.50     Average packs/day: 0.5 packs/day for 30.0 years (15.0 ttl pk-yrs)     Types: Cigarettes    Smokeless tobacco: Never   Vaping Use    Vaping status: Never Used   Substance and Sexual Activity    Alcohol use: No    Drug use: No    Sexual activity: Defer     ---------------------------------------------------------------------------------------------------------------------   Allergies:  Codeine  ---------------------------------------------------------------------------------------------------------------------   Prior to Admission Medications       Prescriptions Last Dose Informant Patient Reported? Taking?    amLODIPine (NORVASC) 10 MG tablet  Pharmacy Yes No    Take 10 mg by mouth Daily.    apixaban (ELIQUIS) 5 MG tablet tablet   No No    Take 1 tablet by mouth Every 12 (Twelve) Hours. Indications: DVT/PE (active thrombosis)    aspirin 81 MG chewable tablet  Self Yes No    Chew 81 mg Daily.    atorvastatin (LIPITOR) 80 MG tablet  Pharmacy Yes No    Take 80 mg by mouth Daily.    bisacodyl (bisacodyl) 5 MG EC tablet   No No    Take 2 at 3pm and 2 at 7pm the day prior to colonoscopy.    bumetanide (BUMEX) 2 MG tablet   No No    Take 1 tablet by mouth Daily for 30 days.    carvedilol (COREG) 25 MG tablet  Pharmacy Yes No    Take 25 mg by mouth 2 (Two) Times a Day With Meals.    citalopram (CeleXA) 20 MG tablet  Pharmacy Yes No    Take 20 mg by mouth Daily.    dexamethasone (DECADRON) 4 MG tablet   No No    Take 1 tablet by mouth 2 (Two) Times a Day With Meals.    doxycycline (MONODOX) 100 MG capsule   No No    Take 1 capsule by mouth Every 12 (Twelve) Hours.    ipratropium-albuterol (DUO-NEB) 0.5-2.5 mg/3 ml nebulizer   Yes No    Take 3 mL by nebulization Every 6  (Six) Hours As Needed for Wheezing.    metFORMIN (GLUCOPHAGE) 500 MG tablet   No No    Take 0.5 tablets by mouth 2 (Two) Times a Day With Meals.    nitroglycerin (NITROSTAT) 0.4 MG SL tablet   Yes No    Place 0.4 mg under the tongue Every 5 (Five) Minutes As Needed for Chest Pain. Take no more than 3 doses in 15 minutes.    omeprazole (priLOSEC) 20 MG capsule  Pharmacy Yes No    Take 20 mg by mouth Daily.    oxyCODONE-acetaminophen (PERCOCET) 5-325 MG per tablet   No No    Take 1 tablet by mouth Every 6 (Six) Hours As Needed for Severe Pain .    polyethylene glycol (MIRALAX) 17 GM/SCOOP powder   No No    Mix 238g powder with 64 oz of clear liquid. Starting at 5pm drink 80z every 10-15 minutes until consumed.          ---------------------------------------------------------------------------------------------------------------------   Vital Signs:  Temp:  [96.2 °F (35.7 °C)] 96.2 °F (35.7 °C)  Heart Rate:  [104] 104  Resp:  [35] 35  BP: (134)/(90) 134/90    Mean Arterial Pressure (Non-Invasive) for the past 24 hrs (Last 3 readings):   Noninvasive MAP (mmHg)   12/02/24 1333 110     SpO2:  [76 %-93 %] 76 %  on  Flow (L/min) (Oxygen Therapy):  [2] 2;   Device (Oxygen Therapy): nasal cannula  Body mass index is 37.63 kg/m².    Wt Readings from Last 3 Encounters:   12/02/24 109 kg (240 lb 4.8 oz)   08/26/23 109 kg (240 lb)   09/20/21 115 kg (253 lb)               ---------------------------------------------------------------------------------------------------------------------   Physical Exam:  Constitutional: Patient is in stretcher, short attention span, answers question but slow, oriented to self place unsure about the year.  Follows commands.   No respiratory distress.      HENT:  Head: Normocephalic and atraumatic.  Mouth:  Moist mucous membranes.  No tongue fasciculation, tympanic membrane both clear no fluid.  Eyes:  Conjunctivae and EOM are normal.  Pupils are equal, round, and reactive to light.  No scleral  icterus.  No nystagmus  Neck:  Neck supple.  No JVD present.  No bruit  Cardiovascular:  Normal rate, regular rhythm and normal heart sounds with no murmur.  Pulmonary/Chest:  No respiratory distress, very rare scattered wheezing no expiratory prolongation , no crackles, with normal breath sounds and good air movement.  Abdominal:  Soft.  Bowel sounds are normal.  No distension and no tenderness.  Obese  Musculoskeletal:  No edema, no tenderness, and no deformity.  No red or swollen joints anywhere.    Neurological:  Alert and oriented to person, place, but not sure of the year.  No cranial nerve deficit.  No tongue deviation.  No facial droop.  No slurred speech.  No dysmetria, no pronator drift, no dysdiadochokinesis  Skin:  Skin is warm and dry.  No rash noted.  No pallor.   Peripheral vascular:  No edema and strong pulses on all 4 extremities.  Genitourinary:  ---------------------------------------------------------------------------------------------------------------------  EKG:        Telemetry: Atrial fibrillation 92 bpm O2 sat of 95% on 2 L  I have personally looked at both the EKG and the telemetry strips.  --------------------------------------------------------------------------------------------------------------------  Results from last 7 days   Lab Units 12/02/24  1502   HSTROP T ng/L 19*     Results from last 7 days   Lab Units 12/02/24  1342   LACTATE mmol/L 2.6*   WBC 10*3/mm3 17.06*   HEMOGLOBIN g/dL 14.6   HEMATOCRIT % 45.2   MCV fL 92.2   MCHC g/dL 32.3   PLATELETS 10*3/mm3 308   INR  1.48*     Results from last 7 days   Lab Units 12/02/24  1502   SODIUM mmol/L 138   POTASSIUM mmol/L 2.6*   MAGNESIUM mg/dL 0.3*   CHLORIDE mmol/L 91*   CO2 mmol/L 29.6*   BUN mg/dL 12   CREATININE mg/dL 0.56*   CALCIUM mg/dL 6.6*   GLUCOSE mg/dL 277*   ALBUMIN g/dL 3.4*   BILIRUBIN mg/dL 1.4*   ALK PHOS U/L 54   AST (SGOT) U/L 15   ALT (SGPT) U/L 17   Estimated Creatinine Clearance: 125.7 mL/min (A) (by C-G  "formula based on SCr of 0.56 mg/dL (L)).    No results found for: \"HGBA1C\", \"POCGLU\"  No results found for: \"AMMONIA\"    Results from last 7 days   Lab Units 12/02/24  1345   NITRITE UA  Negative   WBC UA /HPF 0-2   BACTERIA UA /HPF None Seen   SQUAM EPITHEL UA /HPF 0-2     No results found for: \"BLOODCX\"No results found for: \"RESPCX\"No results found for: \"URINECX\"No results found for: \"WOUNDCX\"No results found for: \"BODYFLDCX\"No results found for: \"STOOLCX\"  pH No results found for: \"PHART\"   pO2 No results found for: \"PO2ART\"   pCO2 No results found for: \"ZND9LZW\"   HCO3 No results found for: \"GLI1UHQ\"     I have personally looked at the labs and they are summarized above.  ----------------------------------------------------------------------------------------------------------------------  Imaging Results (Last 24 Hours)       Procedure Component Value Units Date/Time    XR Chest 1 View [[id number]] Collected: 12/02/24 1528     Updated: 12/02/24 1531    Narrative:      PROCEDURE: XR CHEST 1 VW-       HISTORY: Severe Sepsis Protocol     COMPARISON: 12/21/2020.     FINDINGS: The heart is normal in size. The mediastinum is unremarkable.  The lungs are clear. There is no pneumothorax. There are no acute  osseous abnormalities.       Impression:      No acute cardiopulmonary process.        This report was finalized on 12/2/2024 3:29 PM by Jackie More M.D..       CT Head Without Contrast [[id number]] Collected: 12/02/24 1503     Updated: 12/02/24 1506    Narrative:      PROCEDURE: CT HEAD WO CONTRAST-     HISTORY: AMS     COMPARISON: 12/22/2020.     TECHNIQUE: Multiple axial CT images were performed from the foramen  magnum to the vertex without enhancement. This study was performed with  techniques to keep radiation doses as low as reasonably achievable  (ALARA). Individualized dose reduction techniques using automated  exposure control or adjustment of mA and/or kV according to the patient  size were " employed.     FINDINGS: There is no CT evidence of hemorrhage. There is no mass, mass  effect or midline shift.  There is no hydrocephalus. The paranasal  sinuses are clear. Bone windows reveal no acute osseous abnormalities.       Impression:      No acute intracranial process.              This report was finalized on 12/2/2024 3:04 PM by Jackie More M.D..       CT Abdomen Pelvis Without Contrast [[id number]] Collected: 12/02/24 1455     Updated: 12/02/24 1459    Narrative:      PROCEDURE: CT ABDOMEN PELVIS WO CONTRAST-     HISTORY: Pain w/ vomiting     COMPARISON: None .     PROCEDURE: Axial images were obtained from the lung bases through the  pubic symphysis without intravenous contrast. . This study was performed  with techniques to keep radiation doses as low as reasonably achievable  (ALARA). Individualized dose reduction techniques using automated  exposure control or adjustment of mA and/or kV according to the patient  size were employed.     FINDINGS:     ABDOMEN: There are bilateral pleural effusions and bibasilar  atelectasis. The heart size is normal. Noncontrast images of the liver  are unremarkable with no focal hepatic lesionsl. The spleen is normal.  No adrenal masses are seen.  The pancreas has an unremarkable unenhanced  appearance.. The aorta is normal in caliber. There is no significant  free fluid or adenopathy. There is no nephrolithiasis. There is no  hydronephrosis. Limited noncontrast images of the bowel are  unremarkable.     PELVIS: The appendix is not identified. There are a few scattered  colonic diverticula without evidence of diverticulitis. The patient is  status post hysterectomy. The urinary bladder is unremarkable. There is  no significant fluid or adenopathy. Bone windows reveal no acute osseous  abnormalities or lytic/destructive lesions. The extraperitoneal soft  tissues are unremarkable.       Impression:      No hydronephrosis or nephrolithiasis.              This  report was finalized on 12/2/2024 2:57 PM by Jackie More M.D..             I have personally reviewed the radiology images and read the final radiology report.  ----------------------------------------------------------------------------------------------------------------------  Assessment and Plan:  -Severe dizziness  -Nausea and vomiting secondary to severe dizziness  -New onset atrial fibrillation so far rate controlled CHADS-VASc score of 7  -Severe electrolyte derangement including hypomagnesemia severe, hypocalcemia and hypokalemia  -Diabetes type 2 with hyperglycemia  -History of COPD without exacerbation  -Continued tobacco use disorder  -History of PE on chronic anticoagulation  -Obesity with BMI of 38 complicating all aspects of care  -Chronic hypoxic respiratory failure on oxygen supplementation at home  -History of essential hypertension  -History of hyperlipidemia    MRI of the brain ordered, neurology will be consulted while at the emergency room, aggressively replace electrolytes, gentle hydration, fall precautions, PT OT, continue home medication including anticoagulation, 2D echo.    With regards to the new onset atrial fibrillation, will monitor and continue anticoagulation, rate control medication if needed.  2D echo.  Replace electrolytes, if A-fib is persistent will consult cardiology.    Patient will be admitted to telemetry as outlined above.    Brigida Du MD  12/02/24  17:33 EST    Electronically signed by Brigida Du MD at 12/03/24 1325       Current Facility-Administered Medications   Medication Dose Route Frequency Provider Last Rate Last Admin    albuterol (PROVENTIL) nebulizer solution 0.083% 2.5 mg/3mL  1.25 mg Nebulization Q4H PRN Brigida Du MD        amLODIPine (NORVASC) tablet 5 mg  5 mg Oral Daily Brigida Du MD   5 mg at 12/05/24 0930    apixaban (ELIQUIS) tablet 5 mg  5 mg Oral Q12H Brigida Du MD   5 mg at 12/05/24 0929     arformoterol (BROVANA) nebulizer solution 15 mcg  15 mcg Nebulization BID - RT Brigida Du MD   15 mcg at 12/05/24 0710    aspirin chewable tablet 81 mg  81 mg Oral Daily Brigida Du MD   81 mg at 12/05/24 0929    sennosides-docusate (PERICOLACE) 8.6-50 MG per tablet 2 tablet  2 tablet Oral BID Brigida Du MD   2 tablet at 12/05/24 0931    And    polyethylene glycol (MIRALAX) packet 17 g  17 g Oral Daily PRN Brigida Du MD        And    bisacodyl (DULCOLAX) EC tablet 5 mg  5 mg Oral Daily PRN Brigida Du MD        And    bisacodyl (DULCOLAX) suppository 10 mg  10 mg Rectal Daily PRN Brigida Du MD        budesonide (PULMICORT) nebulizer solution 0.5 mg  0.5 mg Nebulization BID - RT Brigida Du MD   0.5 mg at 12/05/24 0710    Calcium Replacement - Follow Nurse / BPA Driven Protocol   Not Applicable PRN Jack Francis DO        Calcium Replacement - Follow Nurse / BPA Driven Protocol   Not Applicable PRN Jack Francis,         Calcium Replacement - Follow Nurse / BPA Driven Protocol   Not Applicable PRN Brigida Du MD        dextrose (D50W) (25 g/50 mL) IV injection 25 g  25 g Intravenous Q15 Min PRN Brigida Du MD        dextrose (GLUTOSE) oral gel 15 g  15 g Oral Q15 Min PRN Brigida Du MD        diphenhydrAMINE (BENADRYL) injection 25 mg  25 mg Intravenous Once Anand Mars APRN        glucagon HCl (Diagnostic) injection 1 mg  1 mg Intramuscular Q15 Min PRN Brigida Du MD        Insulin Lispro (humaLOG) injection 2-7 Units  2-7 Units Subcutaneous 4x Daily PC & at Bedtime Brigida Du MD   4 Units at 12/04/24 1226    ipratropium (ATROVENT) nebulizer solution 0.5 mg  0.5 mg Nebulization 4x Daily - RT Brigida Du MD   0.5 mg at 12/05/24 0710    isosorbide mononitrate (IMDUR) 24 hr tablet 60 mg  60 mg Oral Brigida Lieberman MD   60 mg at 12/05/24 0622    lisinopril (PRINIVIL,ZESTRIL) tablet 20  mg  20 mg Oral BID Brigida Du MD   20 mg at 12/05/24 0930    Magnesium Cardiology Dose Replacement - Follow Nurse / BPA Driven Protocol   Not Applicable PRN Brigida Du MD        Magnesium Standard Dose Replacement - Follow Nurse / BPA Driven Protocol   Not Applicable PRN Jack Francis DO        Magnesium Standard Dose Replacement - Follow Nurse / BPA Driven Protocol   Not Applicable PRN Jack Francis DO        metoprolol tartrate (LOPRESSOR) tablet 25 mg  25 mg Oral Q12H Brigida Du MD   25 mg at 12/05/24 0930    nicotine (NICODERM CQ) 21 MG/24HR patch 1 patch  1 patch Transdermal Q24H Brigida Du MD   1 patch at 12/04/24 0835    nitroglycerin (NITROSTAT) SL tablet 0.4 mg  0.4 mg Sublingual Q5 Min PRN Brigida Du MD        pantoprazole (PROTONIX) EC tablet 40 mg  40 mg Oral Q AM Brigida Du MD   40 mg at 12/05/24 0621    Phosphorus Replacement - Follow Nurse / BPA Driven Protocol   Not Applicable PRN Jack Francis DO        Phosphorus Replacement - Follow Nurse / BPA Driven Protocol   Not Applicable PRN Brigida Du MD        Potassium Replacement - Follow Nurse / BPA Driven Protocol   Not Applicable PRN Jack Francis DO        Potassium Replacement - Follow Nurse / BPA Driven Protocol   Not Applicable PRN Brigida Du MD        rosuvastatin (CRESTOR) tablet 40 mg  40 mg Oral Daily Brigida Du MD   40 mg at 12/05/24 0929    sodium chloride 0.9 % flush 10 mL  10 mL Intravenous PRN Jack Francis DO        sodium chloride 0.9 % flush 10 mL  10 mL Intravenous Q12H Brigida Du MD   10 mL at 12/05/24 0930    sodium chloride 0.9 % flush 10 mL  10 mL Intravenous PRN Brigida Du MD        sodium chloride 0.9 % infusion 40 mL  40 mL Intravenous PRN Brigida Du MD            Physician Progress Notes (most recent note)        Peter Purcell APRN at 12/05/24 0930          Neurology Progress Note        Chief Complaint:  Headaches/Dizziness/Hallucinations    Subjective    Subjective     Subjective:  Patient again last night had an episode of becoming very agitated, threatening to leave the hospital AMA.  Security had to be called and patient was escorted back to her room, given as needed medications and calm down.  On exam patient is alert and oriented, describing the events of last night, but states she is feeling significantly more calm now.  We explained to her that her hallucinations were likely due to the lesion in her brain that was caused by prior stroke at some point, the exact timing cannot be verified.  Psychiatry was consulted, please see their note for further details, they felt her hallucinations were possibly related to her prior stroke as there was no evidence for psychiatric cause of her symptoms.  She denies any sudden severe headache, reports a very mild 3-4/10 headache overnight.     Review of Systems   Constitutional:  Negative for chills and fever.   Eyes:  Positive for visual disturbance (Left visual field cut).   Respiratory: Negative.     Cardiovascular: Negative.    Skin: Negative.    Neurological:  Negative for seizures, speech difficulty, weakness and headaches.   Psychiatric/Behavioral:  Positive for behavioral problems, confusion and hallucinations. The patient is nervous/anxious.         Objective    Objective      Temp:  [97.9 °F (36.6 °C)-98.7 °F (37.1 °C)] 98.7 °F (37.1 °C)  Heart Rate:  [84-99] 96  Resp:  [18-20] 18  BP: (147-165)/(73-96) 155/89    Neurological Exam  Mental Status  Awake and alert. Oriented only to person, place and situation. Speech is normal. Language is fluent with no aphasia. Attention and concentration are normal.    Cranial Nerves  CN II: Left visual field cut.  CN III, IV, VI: Extraocular movements intact bilaterally. Normal lids and orbits bilaterally.  CN VII: Full and symmetric facial movement.  CN XII: Tongue midline without atrophy or  fasciculations.    Motor  Normal muscle bulk throughout. Normal muscle tone. No abnormal involuntary movements.  Moving extremities equally, no focal motor deficit.    Coordination    No obvious dysmetria.    Gait    Not assessed.      Physical Exam  Vitals and nursing note reviewed.   Constitutional:       General: She is awake.      Appearance: She is obese.   HENT:      Head: Normocephalic.      Mouth/Throat:      Pharynx: Oropharynx is clear.   Eyes:      General: Lids are normal.      Extraocular Movements: Extraocular movements intact.   Cardiovascular:      Rate and Rhythm: Normal rate.   Pulmonary:      Effort: Pulmonary effort is normal. No respiratory distress.   Skin:     General: Skin is warm and dry.   Neurological:      Mental Status: She is alert.   Psychiatric:         Mood and Affect: Mood normal.         Speech: Speech normal.         Behavior: Behavior normal.         Hospital Meds:  Scheduled- amLODIPine, 5 mg, Oral, Daily  apixaban, 5 mg, Oral, Q12H  arformoterol, 15 mcg, Nebulization, BID - RT  aspirin, 81 mg, Oral, Daily  budesonide, 0.5 mg, Nebulization, BID - RT  diphenhydrAMINE, 25 mg, Intravenous, Once  insulin lispro, 2-7 Units, Subcutaneous, 4x Daily PC & at Bedtime  ipratropium, 0.5 mg, Nebulization, 4x Daily - RT  isosorbide mononitrate, 60 mg, Oral, QAM  lisinopril, 20 mg, Oral, BID  metoprolol tartrate, 25 mg, Oral, Q12H  nicotine, 1 patch, Transdermal, Q24H  pantoprazole, 40 mg, Oral, Q AM  rosuvastatin, 40 mg, Oral, Daily  senna-docusate sodium, 2 tablet, Oral, BID  sodium chloride, 10 mL, Intravenous, Q12H      Infusions-       PRNs-   albuterol    senna-docusate sodium **AND** polyethylene glycol **AND** bisacodyl **AND** bisacodyl    Calcium Replacement - Follow Nurse / BPA Driven Protocol    Calcium Replacement - Follow Nurse / BPA Driven Protocol    Calcium Replacement - Follow Nurse / BPA Driven Protocol    dextrose    dextrose    glucagon (human recombinant)    Magnesium  Cardiology Dose Replacement - Follow Nurse / BPA Driven Protocol    Magnesium Standard Dose Replacement - Follow Nurse / BPA Driven Protocol    Magnesium Standard Dose Replacement - Follow Nurse / BPA Driven Protocol    nitroglycerin    Phosphorus Replacement - Follow Nurse / BPA Driven Protocol    Phosphorus Replacement - Follow Nurse / BPA Driven Protocol    Potassium Replacement - Follow Nurse / BPA Driven Protocol    Potassium Replacement - Follow Nurse / BPA Driven Protocol    sodium chloride    sodium chloride    sodium chloride    Results Review:    I reviewed the patient's new clinical results.    Imaging Results (Last 24 Hours)       ** No results found for the last 24 hours. **          Results for orders placed during the hospital encounter of 12/02/24    Adult Transthoracic Echo Complete W/ Cont if Necessary Per Protocol    Interpretation Summary    Left ventricular systolic function is normal. Left ventricular ejection fraction appears to be 56 - 60%.    Left ventricular diastolic function is consistent with (grade Ia w/high LAP) impaired relaxation.    The left atrial cavity is mildly dilated.    The right atrial cavity is mildly  dilated.    Estimated right ventricular systolic pressure from tricuspid regurgitation is normal (<35 mmHg).    MRI Angiogram Venogram Head    Result Date: 12/3/2024  Unremarkable MRV of the brain.    This report was finalized on 12/3/2024 10:38 AM by Jackie More M.D..      CT Angiogram Carotids    Result Date: 12/3/2024  1. Calcific and soft plaque in the right carotid bulb and proximal ICA producing a 60% stenosis. 2. Calcific plaque in the left carotid bulb and proximal ICA producing a 40% stenosis. 3. No hemodynamically significant stenosis in the intracranial vasculature. 4. Partially imaged right pleural effusion.       This report was finalized on 12/3/2024 9:46 AM by Jackie More M.D..      CT Angiogram Head    Result Date: 12/3/2024  1. Calcific and soft  plaque in the right carotid bulb and proximal ICA producing a 60% stenosis. 2. Calcific plaque in the left carotid bulb and proximal ICA producing a 40% stenosis. 3. No hemodynamically significant stenosis in the intracranial vasculature. 4. Partially imaged right pleural effusion.       This report was finalized on 12/3/2024 9:46 AM by Jackie More M.D..      MRI Brain Without Contrast    Result Date: 12/2/2024  Impression: 1. Abnormal T2 signal as described, correlate clinically to exclude osmotic demyelination syndrome. 2. Abnormal gradient echo hypointensity left lateral ventricle, compare with prior imaging or further evaluation with CT recommended.  This report was finalized on 12/2/2024 9:54 PM by Hiram Bird DO.      XR Chest 1 View    Result Date: 12/2/2024  No acute cardiopulmonary process.   This report was finalized on 12/2/2024 3:29 PM by Jackie More M.D..      CT Head Without Contrast    Result Date: 12/2/2024  No acute intracranial process.     This report was finalized on 12/2/2024 3:04 PM by Jackie More M.D..      CT Abdomen Pelvis Without Contrast    Result Date: 12/2/2024  No hydronephrosis or nephrolithiasis.     This report was finalized on 12/2/2024 2:57 PM by Jackie More M.D..        Lab Results   Component Value Date    HGBA1C 8.00 (H) 01/28/2022      Lab Results   Component Value Date    CHOL 202 (H) 01/28/2022    TRIG 112 01/28/2022    HDL 43 01/28/2022     (H) 01/28/2022      Lab Results   Component Value Date    WBC 13.02 (H) 12/04/2024    HGB 12.0 12/04/2024    HCT 38.4 12/04/2024    MCV 96.7 12/04/2024     12/04/2024      Lab Results   Component Value Date    GLUCOSE 195 (H) 12/04/2024    BUN 16 12/04/2024    CREATININE 0.85 12/04/2024    EGFRIFNONA 64 01/28/2022    BCR 18.8 12/04/2024    K 3.7 12/04/2024    CO2 25.9 12/04/2024    CALCIUM 7.7 (L) 12/04/2024    ALBUMIN 3.4 (L) 12/02/2024    AST 15 12/02/2024    ALT 17 12/02/2024      Lab  Results   Component Value Date    TSH 1.890 01/28/2022     Lab Results   Component Value Date    CGGPOJRI30 298 12/03/2024     Lab Results   Component Value Date    FOLATE 7.96 12/03/2024            Assessment/Plan     Assessment/Plan:  68-year-old female with history of DVT/PE, recently diagnosed A-fib anticoagulated with Eliquis, hypertension, obesity, tobacco abuse, diabetes, chronic occipital stroke and hyperlipidemia presented to the ED altered had been complaining of severe headache and dizziness with any body movement, also complaining of diarrhea for the past 3 to 4 days.  Had noticed frequent falling, hallucinations, running into things for the past several months.      No acute abnormalities on initial CT head.  MRI brain showed chronic infarct in right occipital lobe as well as abnormal T2 signal in central mony which spares the peripheral fibers.  CTA head and neck reported 60% stenosis in proximal right ICA, 40% in left.  Also noted on CTA were some narrowing in bilateral MCAs.  The MRV was unremarkable.     #Headache with dizziness, nausea/vomiting, improving.  Less concern for RCVS although we did note narrowing in bilateral MCAs. Patient has not had any more headaches since admission.  MRV was unremarkable  #History of DVT/PE/A-fib anticoagulated with Eliquis  #History of right occipital stroke  #Current everyday smoker  #Hallucinations, likely related to stroke lesion and right occipital lobe          -Recommend repeating MRI brain in 3 months due to concern for osmotic demyelination syndrome raised by abnormal T2 signal in central mony  -Normal blood pressure goals  -Okay to continue Eliquis, also on aspirin for CAD  -Recommend sertraline taper as sertraline is a risk factor for RCVS  -Smoking cessation education  -Delirium precautions: Lights on, shades open from 0700 to 1900 daily with frequent reorientation. Typical risk factors for delirium include advanced age, premorbid neurological disease,  significant impairment in hearing or vision, critical illness, or prolonged hospital admission  -No further neurological recommendations  -Discussed with patient when to return including sudden severe headache, dizziness, or fear from hallucinations.  Explained to patient that the hallucinations may persist and to follow-up with behavioral therapy if needed     The case was discussed with the patient, and Dr. Du.  Tele-Neurology will sign off for now, please feel free to call with any questions/concerns.  Thank again for the consult.    BENJI Quispe  24  11:06 EST    This was an audio and video enabled telemedicine encounter.  Dr. Munoz present for encounter via telemedicine.  Verbal consent taken.      Electronically signed by Peter Purcell APRN at 24 1114          Physical Therapy Notes (most recent note)        Cesar Thomason, PT at 24 1506  Version 1 of 1         Acute Care - Physical Therapy Treatment Note   Fan     Patient Name: Leisa Vasquez  : 1955  MRN: [id number]  Today's Date: 2024      Visit Dx:     ICD-10-CM ICD-9-CM   1. Metabolic encephalopathy  G93.41 348.31   2. Hypomagnesemia  E83.42 275.2   3. Hypokalemia  E87.6 276.8   4. Hypocalcemia  E83.51 275.41     Patient Active Problem List   Diagnosis    Pulmonary embolism    Essential hypertension    Hyperlipidemia    Coronary artery disease s/p statnting int he past    COPD (chronic obstructive pulmonary disease)    T2DM (type 2 diabetes mellitus)    Obesity (BMI 30-39.9)    Leukocytosis    Acute respiratory failure with hypoxia    Macrocytosis    Osteoarthritis    Tobacco abuse    Degenerative changes mid cervical spine with mild spinal stenosis C5-6 and C6-7    Vertigo     Past Medical History:   Diagnosis Date    Arthritis     COPD (chronic obstructive pulmonary disease)     Coronary artery disease s/p statnting int he past 2020    Diabetes     Diabetes mellitus     Essential  hypertension 12/22/2020    Heart & renal disease, hypertensive malignant with heart failure     Heart attack     History of bronchitis     Hyperlipidemia 12/22/2020    Low back pain     Obesity (BMI 30-39.9) 12/22/2020    Pneumonia     Pulmonary embolism      Past Surgical History:   Procedure Laterality Date    CHOLECYSTECTOMY      CORONARY STENT PLACEMENT  02/08/2013    3.0x23mm xieuce mid RCA done at ; LAD [id number] Ref #[id number] done at [facility] 12/29/12    HYSTERECTOMY       PT Assessment (Last 12 Hours)       PT Evaluation and Treatment       Row Name 12/04/24 1503          Physical Therapy Time and Intention    Subjective Information complains of;weakness;fatigue;dizziness  -KM     Document Type therapy note (daily note)  -KM     Mode of Treatment physical therapy  -KM     Patient Effort good  -KM     Symptoms Noted During/After Treatment dizziness  -KM       Row Name 12/04/24 1503          General Information    Patient Profile Reviewed yes  -KM     Patient Observations alert;cooperative;agree to therapy  -KM     Existing Precautions/Restrictions fall  -KM       Row Name 12/04/24 1503          Cognition    Affect/Mental Status (Cognition) confused  -KM     Orientation Status (Cognition) oriented to;person  -KM     Follows Commands (Cognition) follows one-step commands  -KM       Row Name 12/04/24 1503          Bed Mobility    Bed Mobility supine-sit  -KM     Supine-Sit [place] (Bed Mobility) minimum assist (75% patient effort)  -KM     Assistive Device (Bed Mobility) bed rails;head of bed elevated  -KM       Row Name 12/04/24 1503          Transfers    Transfers sit-stand transfer;stand-sit transfer  -KM       Row Name 12/04/24 1503          Sit-Stand Transfer    Sit-Stand [place] (Transfers) minimum assist (75% patient effort);moderate assist (50% patient effort)  -KM     Assistive Device (Sit-Stand Transfers) walker, front-wheeled  -KM       Row Name 12/04/24 1503          Stand-Sit  Transfer    Stand-Sit [place] (Transfers) minimum assist (75% patient effort)  -KM     Assistive Device (Stand-Sit Transfers) walker, front-wheeled  -KM       Row Name 12/04/24 1503          Gait/Stairs (Locomotion)    Gait/Stairs Locomotion gait/ambulation independence;gait/ambulation assistive device;distance ambulated  -KM     [place] Level (Gait) minimum assist (75% patient effort)  -KM     Assistive Device (Gait) walker, front-wheeled  -KM     Patient was able to Ambulate yes  -KM     Distance in Feet (Gait) 50  x2  -KM     Pattern (Gait) step-to  -KM     Deviations/Abnormal Patterns (Gait) ataxic;base of support, narrow;gait speed decreased  -KM     Bilateral Gait Deviations forward flexed posture  -KM       Row Name 12/04/24 1503          Safety Issues/Impairments Affecting Functional Mobility    Impairments Affecting Function (Mobility) balance;endurance/activity tolerance;strength;cognition  -KM       Row Name 12/04/24 1503          Balance    Balance Assessment sitting static balance;standing dynamic balance  -KM     Static Sitting Balance standby assist  -KM     Position, Sitting Balance sitting edge of bed  -KM     Dynamic Standing Balance minimal assist  -KM     Position/Device Used, Standing Balance walker, front-wheeled  -KM       Row Name             Wound 12/04/24 0137 Right lower arm    Wound - Properties Group Placement Date: 12/04/24  -MF Placement Time: 0137 -MF Side: Right  -MF Orientation: lower  -MF Location: arm  -MF Primary Wound Type: Extravasatio  -MF    Retired Wound - Properties Group Placement Date: 12/04/24  - Placement Time: 0137 -MF Side: Right  -MF Orientation: lower  -MF Location: arm  -MF Primary Wound Type: Extravasatio  -MF    Retired Wound - Properties Group Placement Date: 12/04/24  -MF Placement Time: 0137  -MF Side: Right  -MF Orientation: lower  -MF Location: arm  -MF Primary Wound Type: Extravasatio  -MF    Retired Wound - Properties Group Date first  assessed: 12/04/24  - Time first assessed: 0137  -MF Side: Right  -MF Location: arm  -MF Primary Wound Type: Extravasatio  -MF      Row Name 12/04/24 1503          Progress Summary (PT)    Daily Progress Summary (PT) Pt. was able to demonstrate improved functional mobility skills compared to initial evaluation. She was confused during PT session and was agitated throughout. She was able to increase ambulation distance w/ RW. Pt. would continue to benefit from skilled PT services.  -               User Key  (r) = Recorded By, (t) = Taken By, (c) = Cosigned By      Initials Name Provider Type    Cesar Gil, PT Physical Therapist    Aviva Saunders, RN Registered Nurse                    Physical Therapy Education       Title: PT OT SLP Therapies (In Progress)       Topic: Physical Therapy (In Progress)       Point: Mobility training (In Progress)       Learning Progress Summary            Patient Acceptance, E,TB, NR by  at 12/4/2024 0932    Acceptance, E, NR by  at 12/3/2024 2232    Acceptance, E,TB, VU by  at 12/3/2024 1509                      Point: Home exercise program (In Progress)       Learning Progress Summary            Patient Acceptance, E,TB, NR by  at 12/4/2024 0932    Acceptance, E, NR by  at 12/3/2024 2232    Acceptance, E,TB, VU by  at 12/3/2024 1509                      Point: Body mechanics (In Progress)       Learning Progress Summary            Patient Acceptance, E,TB, NR by MP at 12/4/2024 0932    Acceptance, E, NR by  at 12/3/2024 2232    Acceptance, E,TB, VU by  at 12/3/2024 1509                      Point: Precautions (In Progress)       Learning Progress Summary            Patient Acceptance, E,TB, NR by MP at 12/4/2024 0932    Acceptance, E, NR by  at 12/3/2024 2232    Acceptance, E,TB, VU by  at 12/3/2024 1509                                      User Key       Initials Effective Dates Name Provider Type Gallo DESIR 06/16/21 -  Leah George RN  Registered Nurse Nurse     05/24/22 -  Cesar Thomason, PT Physical Therapist PT     06/21/23 -  Aviva Carrion, RN Registered Nurse Nurse                  PT Recommendation and Plan  Anticipated Discharge Disposition (PT): inpatient rehabilitation facility  Planned Therapy Interventions (PT): balance training, bed mobility training, gait training, home exercise program, patient/family education, postural re-education, ROM (range of motion), stair training, strengthening, stretching, transfer training  Therapy Frequency (PT): 3 times/wk (3-5x/wk)  Progress Summary (PT)  Daily Progress Summary (PT): Pt. was able to demonstrate improved functional mobility skills compared to initial evaluation. She was confused during PT session and was agitated throughout. She was able to increase ambulation distance w/ RW. Pt. would continue to benefit from skilled PT services.  Plan of Care Reviewed With: patient, significant other  Outcome Evaluation: Pt. evaluation completed during PT session. She was able to perform functional mobility skills w/ Codey-modA. She was able to ambulate minimal distance w/ Codey for steadying. Pt. c/o dizziness w/ sudden movement and poor balance in standing. Pt. would benefit from increased PT services to address impaired balance, mobility, safety precautions. and fall risk.       Time Calculation:    PT Charges       Row Name 12/04/24 1502             Time Calculation    PT Received On 12/04/24  -KM         Time Calculation- PT    Total Timed Code Minutes- PT 25 minute(s)  -KM                User Key  (r) = Recorded By, (t) = Taken By, (c) = Cosigned By      Initials Name Provider Type     Cesar Thomason, PT Physical Therapist                  Therapy Charges for Today       Code Description Service Date Service Provider Modifiers Qty    92700554748  PT EVAL MOD COMPLEXITY 4 12/3/2024 Cesar Thomason, PT GP 1    66205917474 HC PT THERAPEUTIC ACT EA 15 MIN 12/4/2024 Cesar Thomason, PT GP 1     [id number]  GAIT TRAINING EA 15 MIN 2024 Cesar Thomason, PT GP 1            PT G-Codes  AM-PAC 6 Clicks Score (PT): 20    Cesar Thomason, PT  2024      Electronically signed by Cesar Thomason, PT at 24 1506          Occupational Therapy Notes (most recent note)        Desire Beyer, OT at 24 1457          Patient Name: Leisa Vasquez  : 1955    MRN: [id number]                              Today's Date: 2024       Admit Date: 2024    Visit Dx:     ICD-10-CM ICD-9-CM   1. Metabolic encephalopathy  G93.41 348.31   2. Hypomagnesemia  E83.42 275.2   3. Hypokalemia  E87.6 276.8   4. Hypocalcemia  E83.51 275.41     Patient Active Problem List   Diagnosis    Pulmonary embolism    Essential hypertension    Hyperlipidemia    Coronary artery disease s/p statnting int he past    COPD (chronic obstructive pulmonary disease)    T2DM (type 2 diabetes mellitus)    Obesity (BMI 30-39.9)    Leukocytosis    Acute respiratory failure with hypoxia    Macrocytosis    Osteoarthritis    Tobacco abuse    Degenerative changes mid cervical spine with mild spinal stenosis C5-6 and C6-7    Vertigo     Past Medical History:   Diagnosis Date    Arthritis     COPD (chronic obstructive pulmonary disease)     Coronary artery disease s/p statnting int he past 2020    Diabetes     Diabetes mellitus     Essential hypertension 2020    Heart & renal disease, hypertensive malignant with heart failure     Heart attack     History of bronchitis     Hyperlipidemia 2020    Low back pain     Obesity (BMI 30-39.9) 2020    Pneumonia     Pulmonary embolism      Past Surgical History:   Procedure Laterality Date    CHOLECYSTECTOMY      CORONARY STENT PLACEMENT  2013    3.0x23mm xieuce mid RCA done at ; LAD 2.63vja60db Ref #[id number] done at [facility] 12    HYSTERECTOMY        General Information       Row Name 24 1445          OT Time and Intention    Subjective Information  complains of;weakness;fatigue;dizziness  -     Document Type evaluation  -     Mode of Treatment individual therapy;occupational therapy  -     Patient Effort good  -     Symptoms Noted During/After Treatment dizziness;fatigue;shortness of breath  -       Row Name 12/04/24 1443          General Information    Patient Profile Reviewed yes  -     Prior Level of Function independent:;all household mobility;ADL's  inetermittent assist, limited with IADLs due to poor standing tolerance per patient report  -     Existing Precautions/Restrictions fall  -     Barriers to Rehab cognitive status  alert and oriented but having hallucinations, difficult to redirect  -       Row Name 12/04/24 1447          Occupational Profile    Reason for Services/Referral (Occupational Profile) Patient was admitted to [facility] on 12/2/2024. She was referred for OT evaluation due to change in functional performance with ADLs, functional mobility, and/or transfers.  -       Row Name 12/04/24 1440          Living Environment    People in Home significant other  -[facility] Name 12/04/24 144          Cognition    Orientation Status (Cognition) oriented x 3;other (see comments)  hallucinations of children  -       Row Name 12/04/24 1441          Safety Issues/Impairments Affecting Functional Mobility    Safety Issues Affecting Function (Mobility) awareness of need for assistance;insight into deficits/self-awareness;judgment;impulsivity  -     Impairments Affecting Function (Mobility) balance;endurance/activity tolerance;strength  -               User Key  (r) = Recorded By, (t) = Taken By, (c) = Cosigned By      Initials Name Provider Type     Desire Beyer, OT Occupational Therapist                     Mobility/ADL's       Row Name 12/04/24 3687          Bed Mobility    Bed Mobility supine-sit  -     Supine-Sit [place] (Bed Mobility) minimum assist (75% patient effort)  -     Assistive Device  (Bed Mobility) bed rails;head of bed elevated  -[facility] Name 12/04/24 1450          Transfers    Transfers sit-stand transfer;stand-sit transfer  -[facility] Name 12/04/24 1450          Sit-Stand Transfer    Sit-Stand [place] (Transfers) minimum assist (75% patient effort);moderate assist (50% patient effort)  -     Assistive Device (Sit-Stand Transfers) walker, front-wheeled  -[facility] Name 12/04/24 1450          Stand-Sit Transfer    Stand-Sit [place] (Transfers) minimum assist (75% patient effort)  -     Assistive Device (Stand-Sit Transfers) walker, front-wheeled  -[facility] Name 12/04/24 1450          Activities of Daily Living    BADL Assessment/Intervention bathing;upper body dressing;lower body dressing;grooming;feeding;toileting  -[facility] Name 12/04/24 1450          Bathing Assessment/Intervention    [place] Level (Bathing) bathing skills;moderate assist (50% patient effort)  -[facility] Name 12/04/24 1450          Upper Body Dressing Assessment/Training    [place] Level (Upper Body Dressing) upper body dressing skills;moderate assist (50% patient effort)  -[facility] Name 12/04/24 1450          Lower Body Dressing Assessment/Training    [place] Level (Lower Body Dressing) lower body dressing skills;moderate assist (50% patient effort)  -[facility] Name 12/04/24 1450          Grooming Assessment/Training    [place] Level (Grooming) grooming skills;moderate assist (50% patient effort)  -[facility] Name 12/04/24 1450          Self-Feeding Assessment/Training    [place] Level (Feeding) feeding skills;set up  -[facility] Name 12/04/24 1450          Toileting Assessment/Training    [place] Level (Toileting) toileting skills;moderate assist (50% patient effort)  -               User Key  (r) = Recorded By, (t) = Taken By, (c) = Cosigned By      Initials Name Provider Type    Desire Freeman OT Occupational Therapist                    Obj/Interventions       Row Name 12/04/24 1452          Sensory Assessment (Somatosensory)    Sensory Assessment (Somatosensory) UE sensation intact  -[facility] Name 12/04/24 1452          Vision Assessment/Intervention    Visual Impairment/Limitations WFL  -KP       Row Name 12/04/24 1452          Range of Motion Comprehensive    General Range of Motion bilateral upper extremity ROM WFL  -KP       Row Name 12/04/24 1452          Strength Comprehensive (MMT)    Comment, General Manual Muscle Testing (MMT) Assessment 4+/5 MMT in BUEs  -KP       Row Name 12/04/24 1452          Motor Skills    Motor Skills coordination;functional endurance  -     Coordination WFL;bilateral;upper extremity  -KP     Functional Endurance fair  -KP       [facility] Name 12/04/24 1452          Balance    Balance Assessment sitting static balance;standing static balance  -     Static Sitting Balance standby assist  -     Static Standing Balance minimal assist  -     Position/Device Used, Standing Balance walker, rolling  -KP               User Key  (r) = Recorded By, (t) = Taken By, (c) = Cosigned By      Initials Name Provider Type     Desire Beyer, OT Occupational Therapist                   Goals/Plan       Row Name 12/04/24 1457          Transfer Goal 1 (OT)    Activity/Assistive Device (Transfer Goal 1, OT) transfers, all  -KP     [place]rescent Level/Cues Needed (Transfer Goal 1, OT) standby assist  -     Time Frame (Transfer Goal 1, OT) by discharge  -KP       Row Name 12/04/24 1457          Problem Specific Goal 1 (OT)    Problem Specific Goal 1 (OT) Patient will perform sustained activity X12 minutes to promote functional endurance/activity tolerance needed for daily occupations.  -     Time Frame (Problem Specific Goal 1, OT) by discharge  -KP       Row Name 12/04/24 1457          Therapy Assessment/Plan (OT)    Planned Therapy Interventions (OT) activity tolerance training;BADL retraining;functional balance  retraining;patient/caregiver education/training;occupation/activity based interventions;ROM/therapeutic exercise;strengthening exercise;transfer/mobility retraining  -               User Key  (r) = Recorded By, (t) = Taken By, (c) = Cosigned By      Initials Name Provider Type    Desire Freeman, OT Occupational Therapist                   Clinical Impression       Row Name 12/04/24 1453          Pain Assessment    Pretreatment Pain Rating 0/10 - no pain  -     Posttreatment Pain Rating 0/10 - no pain  -       Row Name 12/04/24 1453          Plan of Care Review    Plan of Care Reviewed With patient  -     Progress no change  -     Outcome Evaluation Patient seen for OT evaluation. She presents with functional performance limitations including generalized weakness, decreased safety with limited insight, impaired balance, and limited functional endurance. Patient would benefit from ongoing OT services to promote highest level of independence and safety prior to discharge.  -       Row Name 12/04/24 1453          Therapy Assessment/Plan (OT)    Patient/Family Therapy Goal Statement (OT) be able to go home  -     Rehab Potential (OT) good  -     Criteria for Skilled Therapeutic Interventions Met (OT) yes;meets criteria;skilled treatment is necessary  -     Therapy Frequency (OT) 3 times/wk  3-5x/week  -     Predicted Duration of Therapy Intervention (OT) discharge  -       Row Name 12/04/24 1453          Therapy Plan Review/Discharge Plan (OT)    Anticipated Discharge Disposition (OT) other (see comments)  determined per cognition; at this time requires 24/7 assist  -       Row Name 12/04/24 1453          Positioning and Restraints    Pre-Treatment Position in bed  -     Post Treatment Position bed  -     In Bed sitting;sitting EOB;call light within reach;encouraged to call for assist;exit alarm on;with other staff  -               User Key  (r) = Recorded By, (t) = Taken By, (c) =  Cosigned By      Initials Name Provider Type     Desire Beyer OT Occupational Therapist                   Outcome Measures       Row Name 12/04/24 0834          How much help from another person do you currently need...    Turning from your back to your side while in flat bed without using bedrails? 4  -MP     Moving from lying on back to sitting on the side of a flat bed without bedrails? 4  -MP     Moving to and from a bed to a chair (including a wheelchair)? 3  -MP     Standing up from a chair using your arms (e.g., wheelchair, bedside chair)? 3  -MP     Climbing 3-5 steps with a railing? 3  -MP     To walk in hospital room? 3  -MP     AM-PAC 6 Clicks Score (PT) 20  -MP               User Key  (r) = Recorded By, (t) = Taken By, (c) = Cosigned By      Initials Name Provider Type    Leah Cole, RN Registered Nurse                    Occupational Therapy Education        No education to display                  OT Recommendation and Plan  Planned Therapy Interventions (OT): activity tolerance training, BADL retraining, functional balance retraining, patient/caregiver education/training, occupation/activity based interventions, ROM/therapeutic exercise, strengthening exercise, transfer/mobility retraining  Therapy Frequency (OT): 3 times/wk (3-5x/week)  Plan of Care Review  Plan of Care Reviewed With: patient  Progress: no change  Outcome Evaluation: Patient seen for OT evaluation. She presents with functional performance limitations including generalized weakness, decreased safety with limited insight, impaired balance, and limited functional endurance. Patient would benefit from ongoing OT services to promote highest level of independence and safety prior to discharge.     Time Calculation:         Time Calculation- OT       Row Name 12/04/24 1459             Time Calculation- OT    OT Received On 12/04/24  -                User Key  (r) = Recorded By, (t) = Taken By, (c) = Cosigned By      Initials  Name Provider Type     Desire Beyer OT Occupational Therapist                  Therapy Charges for Today       Code Description Service Date Service Provider Modifiers Qty    88100174531 HC OT EVAL MOD COMPLEXITY 4 12/4/2024 Desire Beyer OT GO 1                 Desire Beyer OT  12/4/2024    Electronically signed by Desire Beyer OT at 12/04/24 1500       Speech Language Pathology Notes (most recent note)    No notes exist for this encounter.       ADL Documentation (most recent)      Flowsheet Row Most Recent Value   Transferring 2 - assistive person   Toileting 2 - assistive person   Bathing 2 - assistive person   Dressing 2 - assistive person   Eating 0 - independent   Communication 0 - understands/communicates without difficulty   Swallowing 0 - swallows foods/liquids without difficulty   Equipment Currently Used at Home cane, straight, walker, rolling            Discharge Summary    No notes of this type exist for this encounter.       Discharge Order (From admission, onward)       Start     Ordered    12/05/24 1144  Discharge patient  Once        Expected Discharge Date: 12/05/24   Discharge Disposition: Home-Health Care [facility] [place]   Physician of Record for Attribution - Please select from Treatment Team: MURIEL ROGERS [[id number]]   Review needed by CMO to determine Physician of Record: No      Question Answer Comment   Physician of Record for Attribution - Please select from Treatment Team MURIEL ROGERS    Review needed by CMO to determine Physician of Record No        12/05/24 3893

## 2024-12-05 NOTE — CASE MANAGEMENT/SOCIAL WORK
Discharge Planning Assessment  DEONTE Chavira     Patient Name: Leisa Vasquez  MRN: [id number]  Today's Date: 12/5/2024    Admit Date: 12/2/2024       Discharge Plan       Row Name 12/05/24 1206       Plan    Final Note Pt is being d/c home. Pt does not have portable tank and uses [employer]. CM contacted [employer] per Rylie and they will deliver a portable tank as soon as possible. Lead RN Hetal informed.             Mariel Haney RN

## 2024-12-05 NOTE — CASE MANAGEMENT/SOCIAL WORK
Discharge Planning Assessment  [facility]     Patient Name: Leisa Vasquez  MRN: [id number]  Today's Date: 12/5/2024    Admit Date: 12/2/2024    Plan: SS noted Psych evaluation and pt confusion.  SS will continue to follow and assist with discharge plans.     Discharge Plan       Row Name 12/05/24 1333       Plan    Final Discharge Disposition Code 06 - home with home health care    Final Note Pt is being discharged home on this date with physician order for home health. SS spoke with pt at bedside on this date who states no preferance of home health provider. Pt states she will be returning home to [address] [place] [address].  SS faxed home health order to [facility] [phone]. SS discussed referral with [facility] who is going to run pt's insurance. Pt's family at bedside to provide transportation. No other needs identified at this time.          Continued Care and Services - Admitted Since 12/2/2024       Durable Medical Equipment Coordination complete.      Service Provider Request Status Services Address Phone Fax Patient Preferred    [employer] [facility] Durable Medical Equipment [address] [address] [phone] --        Expected Dicharge Date and Time       Expected Discharge Date Expected Discharge Time    Dec 5, 2024           HERMILA Anderson

## 2024-12-06 NOTE — PAYOR COMM NOTE
"CONTACT:  EUNICE COLE RN  UTILIZATION MANAGEMENT DEPT.   [facility]   [address], [address]   PHONE:  [phone]   FAX: [phone]       [facility] 12/05/2024    REF # [id number]        Leisa Vasquez (68 y.o. Female)       Date of Birth   1955    Social Security Number       Address   [address] [address]    Home Phone   [phone]    MRN   [id number]       Alevism   Unknown    Marital Status                               Admission Date   12/2/24    Admission Type   Emergency    Admitting Provider   Brigida Du MD    Attending Provider       Department, Room/Bed   [facility] [address] [facility], 3318/1P       Discharge Date   12/5/2024    Discharge Disposition   Home-Health Care [facility] [place]    Discharge Destination   Home                              Attending Provider: (none)   Allergies: Codeine    Isolation: None   Infection: None   Code Status: Prior    Ht: 170.2 cm (67\")   Wt: 102 kg (225 lb 9.6 oz)    Admission Cmt: None   Principal Problem: Vertigo [R42]                   Active Insurance as of 12/2/2024       Primary Coverage       Payor Plan Insurance Group Employer/Plan Group    [employer] [facility] MEDICARE REPLACEMENT [employer] MED ADV HMO        Payor Plan Address Payor Plan Phone Number Payor Plan Fax Number Effective Dates    [address] [address]   1/1/2024 - None Entered    [facility] [id number]         Subscriber Name Subscriber Birth Date Member ID       LEISA VASQUEZ 1955 [id number]                     Emergency Contacts        (Rel.) Home Phone Work Phone Mobile Phone    EFE MARTINEZ (Friend) [phone] -- --    CHEMOSHANTAL HUSTON (Son) [phone] -- --              Discharge Summary    No notes of this type exist for this encounter.       Discharge Order (From admission, onward)       Start     Ordered    12/05/24 1144  Discharge patient  Once        Expected Discharge Date: 12/05/24   Discharge Disposition: " Home-Health Care [facility] [place]   Physician of Record for Attribution - Please select from Treatment Team: MURIEL ROGERS [[id number]]   Review needed by CMO to determine Physician of Record: No      Question Answer Comment   Physician of Record for Attribution - Please select from Treatment Team MURIEL ROGERS    Review needed by CMO to determine Physician of Record No        12/05/24 4638

## 2024-12-06 NOTE — OUTREACH NOTE
Prep Survey      Flowsheet Row Responses   [facility] facility patient discharged from? [facility]   Is LACE score < 7 ? No   Eligibility Readm Mgmt   Discharge diagnosis Vertigo   Does the patient have one of the following disease processes/diagnoses(primary or secondary)? Other   Does the patient have Home health ordered? No   Is there a DME ordered? Yes   What DME was ordered? [employer] for portable tank   Prep survey completed? Yes            ANN GRAY - Registered Nurse

## 2024-12-06 NOTE — PAYOR COMM NOTE
"CONTACT:  EUNICE COLE RN  UTILIZATION MANAGEMENT DEPT.   [facility]   [address] [facility], [address]   PHONE:  [phone]   FAX: [phone]         UPDATED CLINICALS PER REQUEST-AUTH PENDING        Leisa Vasquez (68 y.o. Female)       Date of Birth   1955    Social Security Number       Address   [address] [address]    Home Phone   [phone]    N   [id number]       Sikh   Unknown    Marital Status                               Admission Date   12/2/24    Admission Type   Emergency    Admitting Provider   Brigida Du MD    Attending Provider       Department, Room/Bed   [facility] [address] [facility], [address]/       Discharge Date   12/5/2024    [place] [facility] [place]ination   [place]                              Attending Provider: (none)   Allergies: Codeine    Isolation: None   Infection: None   Code Status: Prior    Ht: 170.2 cm (67\")   Wt: 102 kg (225 lb 9.6 oz)    Admission Cmt: None   Principal Problem: Vertigo [R42]                   Active Insurance as of 12/2/2024       Primary Coverage       Payor Plan Insurance Group Employer/Plan Group    WELLCARE [facility] MEDICARE REPLACEMENT WELLCARE MED ADV HMO        Payor Plan Address Payor Plan Phone Number Payor Plan Fax Number Effective Dates    [address] [address]   1/1/2024 - None Entered    [facility] [address]         Subscriber Name Subscriber Birth Date Member ID       LEISA VASQUEZ 1955 [id number]                     Emergency Contacts        (Rel.) Home Phone Work Phone Mobile Phone    EFE MARTINEZ (Friend) [phone] -- --    CHEMOSHANTAL (Son) [phone] -- --              Orders (last 48 hrs)        Start     Ordered    12/06/24 0400  Extravasation Standing Orders - Encourage Active Range of Motion After 48 Hours  As Needed,   Status:  Canceled       12/04/24 0136    12/06/24 0000  amLODIPine (NORVASC) 5 MG tablet  " Daily         12/05/24 1150    12/06/24 0000  aspirin 81 MG chewable tablet  Daily         12/05/24 1150    12/05/24 1146  Discontinue IV  Once,   Status:  Canceled         12/05/24 1150    12/05/24 1144  Discharge patient  Once         12/05/24 1150    12/05/24 0758  POC Glucose Once  PROCEDURE ONCE        Comments: Complete no more than 45 minutes prior to patient eating      12/05/24 0750    12/05/24 0000  budesonide-formoterol (Symbicort) 160-4.5 MCG/ACT inhaler  2 Times Daily - RT         12/05/24 1150    12/05/24 0000  Discharge Follow-up with PCP         12/05/24 1150    12/05/24 0000  Ambulatory Referral to Home Health         12/05/24 1150    12/04/24 2223  haloperidol lactate (HALDOL) injection 0.5 mg  Once         12/04/24 2224    12/04/24 2215  Restraints Violent or Self-Destructive Adult (Age 18 & Older)  Continuous x4 Hours,   Status:  Canceled         12/04/24 2349    12/04/24 2035  POC Glucose Once  PROCEDURE ONCE        Comments: Complete no more than 45 minutes prior to patient eating      12/04/24 2029    12/04/24 1630  POC Glucose Once  PROCEDURE ONCE        Comments: Complete no more than 45 minutes prior to patient eating      12/04/24 1624    12/04/24 1217  D-dimer, Quantitative  Once         12/04/24 1216    12/04/24 1116  POC Glucose Once  PROCEDURE ONCE        Comments: Complete no more than 45 minutes prior to patient eating      12/04/24 1105    12/04/24 0927  Inpatient Psychiatrist Consult  Once        Specialty:  Psychiatry  Provider:  João Mckinley MD    12/04/24 0926    12/04/24 0900  amLODIPine (NORVASC) tablet 5 mg  Daily,   Status:  Discontinued         12/03/24 1332    12/04/24 0812  POC Glucose Once  PROCEDURE ONCE        Comments: Complete no more than 45 minutes prior to patient eating      12/04/24 0757    12/04/24 0800  magnesium sulfate 4g/100mL (PREMIX) infusion  Once         12/04/24 0427    12/04/24 0700  isosorbide mononitrate (IMDUR) 24 hr tablet 60 mg  Every  Morning,   Status:  Discontinued         12/03/24 1601    12/04/24 0600  pantoprazole (PROTONIX) EC tablet 40 mg  Every Early Morning,   Status:  Discontinued         12/03/24 1601    12/04/24 0600  CBC Auto Differential  PROCEDURE ONCE         12/03/24 2202    12/04/24 0600  Magnesium  Morning Draw         12/04/24 0250    12/04/24 0530  diphenhydrAMINE (BENADRYL) injection 25 mg  Once,   Status:  Discontinued         12/04/24 0444    12/03/24 2100  lisinopril (PRINIVIL,ZESTRIL) tablet 20 mg  2 Times Daily,   Status:  Discontinued         12/03/24 1601    12/03/24 1715  sodium chloride 0.9 % infusion  Continuous,   Status:  Discontinued         12/03/24 1628    12/03/24 1700  rosuvastatin (CRESTOR) tablet 40 mg  Daily,   Status:  Discontinued         12/03/24 1601    12/03/24 1700  sertraline (ZOLOFT) tablet 25 mg  Daily,   Status:  Discontinued         12/03/24 1601    12/03/24 1601  albuterol (PROVENTIL) nebulizer solution 0.083% 2.5 mg/3mL  Every 4 Hours PRN,   Status:  Discontinued         12/03/24 1601    12/03/24 1430  metoprolol tartrate (LOPRESSOR) tablet 25 mg  Every 12 Hours Scheduled,   Status:  Discontinued         12/03/24 1332    12/03/24 0900  aspirin chewable tablet 81 mg  Daily,   Status:  Discontinued         12/02/24 2216 12/03/24 0900  POC Glucose 4x Daily PC & at Bedtime  4 Times Daily After Meals & at Bedtime,   Status:  Canceled      Comments: Complete no more than 45 minutes prior to patient eating      12/02/24 2216 12/03/24 0900  Insulin Lispro (humaLOG) injection 2-7 Units  4 Times Daily After Meals & at Bedtime,   Status:  Discontinued         12/02/24 2216 12/03/24 0900  nicotine (NICODERM CQ) 21 MG/24HR patch 1 patch  Every 24 Hours Scheduled,   Status:  Discontinued         12/02/24 2216 12/03/24 0800  Oral Care  2 Times Daily,   Status:  Canceled       12/02/24 2216 12/03/24 0000  Vital Signs  Every 4 Hours,   Status:  Canceled       12/02/24 2216    12/02/24 7063   "apixaban (ELIQUIS) tablet 5 mg  Every 12 Hours Scheduled,   Status:  Discontinued         12/02/24 2216 12/02/24 2315  sodium chloride 0.9 % flush 10 mL  Every 12 Hours Scheduled,   Status:  Discontinued         12/02/24 2216 12/02/24 2315  sennosides-docusate (PERICOLACE) 8.6-50 MG per tablet 2 tablet  2 Times Daily,   Status:  Discontinued        Placed in \"And\" Linked Group    12/02/24 2216 12/02/24 2315  ipratropium (ATROVENT) nebulizer solution 0.5 mg  4 Times Daily - RT,   Status:  Discontinued         12/02/24 2216 12/02/24 2315  arformoterol (BROVANA) nebulizer solution 15 mcg  2 Times Daily - RT,   Status:  Discontinued         12/02/24 2216 12/02/24 2315  budesonide (PULMICORT) nebulizer solution 0.5 mg  2 Times Daily - RT,   Status:  Discontinued         12/02/24 2216 12/02/24 2217  Basic Metabolic Panel  Daily       12/02/24 2216 12/02/24 2217  CBC & Differential  Daily       12/02/24 2216 12/02/24 2217  Magnesium  Daily       12/02/24 2216 12/02/24 2217  Phosphorus  Daily       12/02/24 2216 12/02/24 2216  Intake & Output  Every Shift,   Status:  Canceled       12/02/24 2216 12/02/24 2215  sodium chloride 0.9 % flush 10 mL  As Needed,   Status:  Discontinued         12/02/24 2216 12/02/24 2215  sodium chloride 0.9 % infusion 40 mL  As Needed,   Status:  Discontinued         12/02/24 2216 12/02/24 2215  Follow Hypoglycemia Standing Orders For Blood Glucose <70 & Notify Provider of Treatment  As Needed,   Status:  Canceled      Comments: Follow Hypoglycemia Orders As Outlined in Process Instructions (Open Order Report to View Full Instructions)  Notify Provider Any Time Hypoglycemia Treatment is Administered    12/02/24 2216 12/02/24 2215  dextrose (GLUTOSE) oral gel 15 g  Every 15 Minutes PRN,   Status:  Discontinued         12/02/24 2216 12/02/24 2215  dextrose (D50W) (25 g/50 mL) IV injection 25 g  Every 15 Minutes PRN,   Status:  Discontinued         " "12/02/24 2216 12/02/24 2215  glucagon HCl (Diagnostic) injection 1 mg  Every 15 Minutes PRN,   Status:  Discontinued         12/02/24 2216 12/02/24 2215  nitroglycerin (NITROSTAT) SL tablet 0.4 mg  Every 5 Minutes PRN,   Status:  Discontinued         12/02/24 2216 12/02/24 2215  Up With Assistance  As Needed,   Status:  Canceled       12/02/24 2216 12/02/24 2215  Potassium Replacement - Follow Nurse / BPA Driven Protocol  As Needed,   Status:  Discontinued         12/02/24 2216 12/02/24 2215  Magnesium Cardiology Dose Replacement - Follow Nurse / BPA Driven Protocol  As Needed,   Status:  Discontinued         12/02/24 2216 12/02/24 2215  Phosphorus Replacement - Follow Nurse / BPA Driven Protocol  As Needed,   Status:  Discontinued         12/02/24 2216 12/02/24 2215  Calcium Replacement - Follow Nurse / BPA Driven Protocol  As Needed,   Status:  Discontinued         12/02/24 2216 12/02/24 2215  polyethylene glycol (MIRALAX) packet 17 g  Daily PRN,   Status:  Discontinued        Placed in \"And\" Linked Group    12/02/24 2216    12/02/24 2215  bisacodyl (DULCOLAX) EC tablet 5 mg  Daily PRN,   Status:  Discontinued        Placed in \"And\" Linked Group    12/02/24 2216 12/02/24 2215  bisacodyl (DULCOLAX) suppository 10 mg  Daily PRN,   Status:  Discontinued        Placed in \"And\" Linked Group    12/02/24 2216 12/02/24 1716  Potassium Replacement - Follow Nurse / BPA Driven Protocol  As Needed,   Status:  Discontinued         12/02/24 1716 12/02/24 1716  Magnesium Standard Dose Replacement - Follow Nurse / BPA Driven Protocol  As Needed,   Status:  Discontinued         12/02/24 1716 12/02/24 1716  Phosphorus Replacement - Follow Nurse / BPA Driven Protocol  As Needed,   Status:  Discontinued         12/02/24 1716 12/02/24 1716  Calcium Replacement - Follow Nurse / BPA Driven Protocol  As Needed,   Status:  Discontinued         12/02/24 1716    12/02/24 1716  Magnesium Standard Dose " Replacement - Follow Nurse / BPA Driven Protocol  As Needed,   Status:  Discontinued         12/02/24 1716    12/02/24 1646  Basic Metabolic Panel  Daily       12/02/24 1645    12/02/24 1645  Calcium Replacement - Follow Nurse / BPA Driven Protocol  As Needed,   Status:  Discontinued         12/02/24 1645    12/02/24 1338  Oxygen Therapy- Nasal Cannula; Titrate 1-6 LPM Per SpO2; 90 - 95%  Continuous PRN,   Status:  Canceled       12/02/24 1338    12/02/24 1338  sodium chloride 0.9 % flush 10 mL  As Needed,   Status:  Discontinued         12/02/24 1338    --  ezetimibe (Zetia) 10 MG tablet  Daily         12/02/24 2233    --  isosorbide mononitrate (IMDUR) 60 MG 24 hr tablet  Every Morning         12/02/24 2233    --  lisinopril (PRINIVIL,ZESTRIL) 20 MG tablet  2 Times Daily         12/02/24 2233    --  metFORMIN (GLUCOPHAGE) 1000 MG tablet  2 Times Daily With Meals         12/02/24 2234    --  rosuvastatin (CRESTOR) 40 MG tablet  Daily         12/02/24 2234    --  sertraline (ZOLOFT) 25 MG tablet  Daily,   Status:  Discontinued         12/02/24 2236    --  levalbuterol (XOPENEX) 0.63 MG/3ML nebulizer solution  Every 4 Hours PRN         12/03/24 1356    --  doxycycline (VIBRAMYCIN) 100 MG capsule  Every 12 Hours,   Status:  Discontinued         12/03/24 1409    --  predniSONE (DELTASONE) 20 MG tablet  Daily,   Status:  Discontinued         12/03/24 1409                     Physician Progress Notes (last 48 hours)        Peter Purcell APRN at 12/05/24 0930       Attestation signed by Nahed Munoz MD at 12/05/24 7052 (Updated)    I saw the patient at the bedside via televideo with YOEL Purcell.  The patient's headaches have largely resolved. RCVS is possible, will hold cerebral angiogram for now unless recurrent symptoms. Will wean sertraline as below.    The patient has had visual hallucination on admission which were reported to be present at home as well. The patient is rather young for hospital-acquired  delirium. Psychiatry did evaluate the patient and did not favor a primary psychiatric etiology. It is possible that the hallucinations are related to her right occipital stroke, of which the time of onset is uncertain. Seizures, paraneoplastic, or demyelinating etiologies should be considered, particularly in the presence of the patient's abnormal MRI with FLAIR changes in the mony. Osmotic demyelination could cause behavioral and psychiatric disturbance. The patient's sodium is within normal limits now at 138. 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  Unfortunately, treatment is supportive.  We did advise the patient to return to the emergency department if the visual hallucinations are threatening or concerning to her or if she has thoughts of SI or HI.    The patient did have leukocytosis on admission which is downtrending.  UA unremarkable, chest x-ray unremarkable, blood culture unremarkable.  Certainly if the patient develops new or worsening symptoms, she should be re-admitted for continuous video EEG, lumbar puncture with paraneoplastic panel, and MRI brain with contrast. We will schedule a soon follow-up with YOEL Purcell in clinic. Thank you for this consult. Please call with questions.                Neurology Progress Note       Chief Complaint:  Headaches/Dizziness/Hallucinations    Subjective    Subjective     Subjective:  Patient again last night had an episode of becoming very agitated, threatening to leave the hospital AMA.  Security had to be called and patient was escorted back to her room, given as needed medications and calm down.  On exam patient is alert and oriented, describing the events of last night, but states she is feeling significantly more calm now.  We explained to her that her hallucinations were likely due to the lesion in her brain that was caused by prior stroke at some point, the exact timing cannot be verified.  Psychiatry was consulted,  please see their note for further details, they felt her hallucinations were possibly related to her prior stroke as there was no evidence for psychiatric cause of her symptoms.  She denies any sudden severe headache, reports a very mild 3-4/10 headache overnight.     Review of Systems   Constitutional:  Negative for chills and fever.   Eyes:  Positive for visual disturbance (Left visual field cut).   Respiratory: Negative.     Cardiovascular: Negative.    Skin: Negative.    Neurological:  Negative for seizures, speech difficulty, weakness and headaches.   Psychiatric/Behavioral:  Positive for behavioral problems, confusion and hallucinations. The patient is nervous/anxious.         Objective    Objective      Temp:  [97.9 °F (36.6 °C)-98.7 °F (37.1 °C)] 98.7 °F (37.1 °C)  Heart Rate:  [84-99] 96  Resp:  [18-20] 18  BP: (147-165)/(73-96) 155/89    Neurological Exam  Mental Status  Awake and alert. Oriented only to person, place and situation. Speech is normal. Language is fluent with no aphasia. Attention and concentration are normal.    Cranial Nerves  CN II: Left visual field cut.  CN III, IV, VI: Extraocular movements intact bilaterally. Normal lids and orbits bilaterally.  CN VII: Full and symmetric facial movement.  CN XII: Tongue midline without atrophy or fasciculations.    Motor  Normal muscle bulk throughout. Normal muscle tone. No abnormal involuntary movements.  Moving extremities equally, no focal motor deficit.    Coordination    No obvious dysmetria.    Gait    Not assessed.      Physical Exam  Vitals and nursing note reviewed.   Constitutional:       General: She is awake.      Appearance: She is obese.   HENT:      Head: Normocephalic.      Mouth/Throat:      Pharynx: Oropharynx is clear.   Eyes:      General: Lids are normal.      Extraocular Movements: Extraocular movements intact.   Cardiovascular:      Rate and Rhythm: Normal rate.   Pulmonary:      Effort: Pulmonary effort is normal. No  respiratory distress.   Skin:     General: Skin is warm and dry.   Neurological:      Mental Status: She is alert.   Psychiatric:         Mood and Affect: Mood normal.         Speech: Speech normal.         Behavior: Behavior normal.         Hospital Meds:  Scheduled- amLODIPine, 5 mg, Oral, Daily  apixaban, 5 mg, Oral, Q12H  arformoterol, 15 mcg, Nebulization, BID - RT  aspirin, 81 mg, Oral, Daily  budesonide, 0.5 mg, Nebulization, BID - RT  diphenhydrAMINE, 25 mg, Intravenous, Once  insulin lispro, 2-7 Units, Subcutaneous, 4x Daily PC & at Bedtime  ipratropium, 0.5 mg, Nebulization, 4x Daily - RT  isosorbide mononitrate, 60 mg, Oral, QAM  lisinopril, 20 mg, Oral, BID  metoprolol tartrate, 25 mg, Oral, Q12H  nicotine, 1 patch, Transdermal, Q24H  pantoprazole, 40 mg, Oral, Q AM  rosuvastatin, 40 mg, Oral, Daily  senna-docusate sodium, 2 tablet, Oral, BID  sodium chloride, 10 mL, Intravenous, Q12H      Infusions-       PRNs-   albuterol    senna-docusate sodium **AND** polyethylene glycol **AND** bisacodyl **AND** bisacodyl    Calcium Replacement - Follow Nurse / BPA Driven Protocol    Calcium Replacement - Follow Nurse / BPA Driven Protocol    Calcium Replacement - Follow Nurse / BPA Driven Protocol    dextrose    dextrose    glucagon (human recombinant)    Magnesium Cardiology Dose Replacement - Follow Nurse / BPA Driven Protocol    Magnesium Standard Dose Replacement - Follow Nurse / BPA Driven Protocol    Magnesium Standard Dose Replacement - Follow Nurse / BPA Driven Protocol    nitroglycerin    Phosphorus Replacement - Follow Nurse / BPA Driven Protocol    Phosphorus Replacement - Follow Nurse / BPA Driven Protocol    Potassium Replacement - Follow Nurse / BPA Driven Protocol    Potassium Replacement - Follow Nurse / BPA Driven Protocol    sodium chloride    sodium chloride    sodium chloride    Results Review:    I reviewed the patient's new clinical results.    Imaging Results (Last 24 Hours)       ** No  results found for the last 24 hours. **          Results for orders placed during the hospital encounter of 12/02/24    Adult Transthoracic Echo Complete W/ Cont if Necessary Per Protocol    Interpretation Summary    Left ventricular systolic function is normal. Left ventricular ejection fraction appears to be 56 - 60%.    Left ventricular diastolic function is consistent with (grade Ia w/high LAP) impaired relaxation.    The left atrial cavity is mildly dilated.    The right atrial cavity is mildly  dilated.    Estimated right ventricular systolic pressure from tricuspid regurgitation is normal (<35 mmHg).    MRI Angiogram Venogram Head    Result Date: 12/3/2024  Unremarkable MRV of the brain.    This report was finalized on 12/3/2024 10:38 AM by Jackie More M.D..      CT Angiogram Carotids    Result Date: 12/3/2024  1. Calcific and soft plaque in the right carotid bulb and proximal ICA producing a 60% stenosis. 2. Calcific plaque in the left carotid bulb and proximal ICA producing a 40% stenosis. 3. No hemodynamically significant stenosis in the intracranial vasculature. 4. Partially imaged right pleural effusion.       This report was finalized on 12/3/2024 9:46 AM by Jackie More M.D..      CT Angiogram Head    Result Date: 12/3/2024  1. Calcific and soft plaque in the right carotid bulb and proximal ICA producing a 60% stenosis. 2. Calcific plaque in the left carotid bulb and proximal ICA producing a 40% stenosis. 3. No hemodynamically significant stenosis in the intracranial vasculature. 4. Partially imaged right pleural effusion.       This report was finalized on 12/3/2024 9:46 AM by Jackie More M.D..      MRI Brain Without Contrast    Result Date: 12/2/2024  Impression: 1. Abnormal T2 signal as described, correlate clinically to exclude osmotic demyelination syndrome. 2. Abnormal gradient echo hypointensity left lateral ventricle, compare with prior imaging or further evaluation with CT  recommended.  This report was finalized on 12/2/2024 9:54 PM by Hiram Bird DO.      XR Chest 1 View    Result Date: 12/2/2024  No acute cardiopulmonary process.   This report was finalized on 12/2/2024 3:29 PM by Jackie More M.D..      CT Head Without Contrast    Result Date: 12/2/2024  No acute intracranial process.     This report was finalized on 12/2/2024 3:04 PM by Jackie More M.D..      CT Abdomen Pelvis Without Contrast    Result Date: 12/2/2024  No hydronephrosis or nephrolithiasis.     This report was finalized on 12/2/2024 2:57 PM by Jackie More M.D..        Lab Results   Component Value Date    HGBA1C 8.00 (H) 01/28/2022      Lab Results   Component Value Date    CHOL 202 (H) 01/28/2022    TRIG 112 01/28/2022    HDL 43 01/28/2022     (H) 01/28/2022      Lab Results   Component Value Date    WBC 13.02 (H) 12/04/2024    HGB 12.0 12/04/2024    HCT 38.4 12/04/2024    MCV 96.7 12/04/2024     12/04/2024      Lab Results   Component Value Date    GLUCOSE 195 (H) 12/04/2024    BUN 16 12/04/2024    CREATININE 0.85 12/04/2024    EGFRIFNONA 64 01/28/2022    BCR 18.8 12/04/2024    K 3.7 12/04/2024    CO2 25.9 12/04/2024    CALCIUM 7.7 (L) 12/04/2024    ALBUMIN 3.4 (L) 12/02/2024    AST 15 12/02/2024    ALT 17 12/02/2024      Lab Results   Component Value Date    TSH 1.890 01/28/2022     Lab Results   Component Value Date    WASNMSWD70 298 12/03/2024     Lab Results   Component Value Date    FOLATE 7.96 12/03/2024            Assessment/Plan     Assessment/Plan:  68-year-old female with history of DVT/PE, recently diagnosed A-fib anticoagulated with Eliquis, hypertension, obesity, tobacco abuse, diabetes, chronic occipital stroke and hyperlipidemia presented to the ED altered had been complaining of severe headache and dizziness with any body movement, also complaining of diarrhea for the past 3 to 4 days.  Had noticed frequent falling, hallucinations, running into things for the  past several months.      No acute abnormalities on initial CT head.  MRI brain showed chronic infarct in right occipital lobe as well as abnormal T2 signal in central mony which spares the peripheral fibers.  CTA head and neck reported 60% stenosis in proximal right ICA, 40% in left.  Also noted on CTA were some narrowing in bilateral MCAs.  The MRV was unremarkable.     #Headache with dizziness, nausea/vomiting, improving.  Less concern for RCVS although we did note narrowing in bilateral MCAs. Patient has not had any more headaches since admission.  MRV was unremarkable  #History of DVT/PE/A-fib anticoagulated with Eliquis  #History of right occipital stroke  #Current everyday smoker  #Hallucinations, likely related to stroke lesion and right occipital lobe          -Recommend repeating MRI brain in 3 months due to concern for osmotic demyelination syndrome raised by abnormal T2 signal in central mony  -Normal blood pressure goals  -Okay to continue Eliquis, also on aspirin for CAD  -Recommend sertraline taper as sertraline is a risk factor for RCVS  -Smoking cessation education  -Delirium precautions: Lights on, shades open from 0700 to 1900 daily with frequent reorientation. Typical risk factors for delirium include advanced age, premorbid neurological disease, significant impairment in hearing or vision, critical illness, or prolonged hospital admission  -No further neurological recommendations  -Discussed with patient when to return including sudden severe headache, dizziness, or fear from hallucinations.  Explained to patient that the hallucinations may persist and to follow-up with behavioral therapy if needed     The case was discussed with the patient, and Dr. Du.  Tele-Neurology will sign off for now, please feel free to call with any questions/concerns.  Thank again for the consult.    Peter Purcell, BENJI  12/05/24  11:06 EST    This was an audio and video enabled telemedicine encounter.    Alexander present for encounter via telemedicine.  Verbal consent taken.      Electronically signed by Nahed Munoz MD at 24 [id number]       Brigida Du MD at 24 [id number]              [facility] PROGRESS NOTE     Patient Identification:  Name:  Leisa Vasquez  Age:  68 y.o.  Sex:  female  :  1955  MRN:  [id number]  Visit Number:  [id number]  Primary Care Provider:  Leisa Balderas MD    Length of stay:  2    Subjective: Patient has episodes of visual hallucination, she is not confused, she is aware that she is hallucinating but is petrified of the thought.  She also is very agitated because she is prevented from getting out of bed due to gait instability, the bed alarm worsens her agitation.  Right explained to patient together with  and son although she is improving especially her mobility, for safety precaution, would refrain from getting out of bed without assistance.  Yesterday briefly she thought she saw some babies on the floor, today she also thought a man/stranger entered the room and took her phone.    Chief Complaint: Dizziness and hallucination  ----------------------------------------------------------------------------------------------------------------------  [facility] Meds:  amLODIPine, 5 mg, Oral, Daily  apixaban, 5 mg, Oral, Q12H  arformoterol, 15 mcg, Nebulization, BID - RT  aspirin, 81 mg, Oral, Daily  budesonide, 0.5 mg, Nebulization, BID - RT  diphenhydrAMINE, 25 mg, Intravenous, Once  insulin lispro, 2-7 Units, Subcutaneous, 4x Daily PC & at Bedtime  ipratropium, 0.5 mg, Nebulization, 4x Daily - RT  isosorbide mononitrate, 60 mg, Oral, QAM  lisinopril, 20 mg, Oral, BID  metoprolol tartrate, 25 mg, Oral, Q12H  nicotine, 1 patch, Transdermal, Q24H  pantoprazole, 40 mg, Oral, Q AM  rosuvastatin, 40 mg, Oral, Daily  senna-docusate sodium, 2 tablet, Oral, BID  sodium chloride, 10 mL, Intravenous, Q12H      sodium chloride, 100 mL/hr,  Last Rate: Stopped (12/04/24 0601)      ----------------------------------------------------------------------------------------------------------------------  Vital Signs:  Temp:  [97.7 °F (36.5 °C)-98.7 °F (37.1 °C)] 98.3 °F (36.8 °C)  Heart Rate:  [] 91  Resp:  [16-18] 16  BP: (143-166)/(65-89) 143/65       Tele: Sinus rhythm 91 bpm      12/02/24  1928 12/03/24  0500 12/04/24  0500   Weight: 101 kg (223 lb 5.2 oz) 101 kg (223 lb 5.2 oz) (new admit less than 24 hours) 101 kg (222 lb 10.6 oz)     Body mass index is 34.87 kg/m².    Intake/Output Summary (Last 24 hours) at 12/4/2024 1352  Last data filed at 12/4/2024 0800  Gross per 24 hour   Intake 478.79 ml   Output 650 ml   Net -171.21 ml     Diet: Diabetic; Consistent Carbohydrate; Fluid Consistency: Thin (IDDSI 0)  ----------------------------------------------------------------------------------------------------------------------  Physical exam:  General: Comfortable,awake, alert, oriented to self, place, and time, well-developed and well-nourished.  No respiratory distress.    Skin:  Skin is warm and dry. No rash noted. No pallor.    HENT:  Head:  Normocephalic and atraumatic.  Mouth:  Moist mucous membranes.    Eyes:  Conjunctivae and EOM are normal.  Pupils are equal, round, and reactive to light.  No scleral icterus.  No nystagmus  Neck:  Neck supple.  No JVD present.  No bruit  Pulmonary/Chest:  No respiratory distress, no wheezes, no crackles, with normal breath sounds and good air movement.  Cardiovascular:  Normal rate, regular rhythm and normal heart sounds with no murmur.  Abdominal:  Soft.  Bowel sounds are normal.  No distension and no tenderness.   Extremities:  No edema, no tenderness, and no deformity.  No red or swollen joints anywhere.  Strong pulses in all 4 extremities with no clubbing, no cyanosis, no edema.  Neurological:  Motor strength equal no obvious deficit, sensory grossly intact.   No cranial nerve deficit.  No tongue  "deviation.  No facial droop.  No slurred speech.    Genitourinary: No Bush catheter  Back:  ----------------------------------------------------------------------------------------------------------------------  ----------------------------------------------------------------------------------------------------------------------  Results from last 7 days   Lab Units 12/02/24 1838 12/02/24  1502   HSTROP T ng/L 20* 19*     Results from last 7 days   Lab Units 12/04/24  0023 12/03/24  0029 12/02/24 1838 12/02/24  1342   LACTATE mmol/L  --   --  1.5 2.6*   WBC 10*3/mm3 13.02* 17.16*  --  17.06*   HEMOGLOBIN g/dL 12.0 12.8  --  14.6   HEMATOCRIT % 38.4 39.8  --  45.2   MCV fL 96.7 94.1  --  92.2   MCHC g/dL 31.3* 32.2  --  32.3   PLATELETS 10*3/mm3 237 262  --  308   INR   --   --   --  1.48*     Results from last 7 days   Lab Units 12/03/24  1617   PH, ARTERIAL pH units 7.482*   PO2 ART mm Hg 88.7   PCO2, ARTERIAL mm Hg 43.5   HCO3 ART mmol/L 32.5*     Results from last 7 days   Lab Units 12/04/24 0448 12/04/24  0023 12/03/24  1433 12/03/24  0029 12/02/24  1838 12/02/24  1502   SODIUM mmol/L  --  139  --  140 138 138   POTASSIUM mmol/L  --  3.7 3.7 3.0* 2.7* 2.6*   MAGNESIUM mg/dL 1.7 1.9 2.0 1.6  --  0.3*   CHLORIDE mmol/L  --  102  --  95* 92* 91*   CO2 mmol/L  --  25.9  --  33.8* 31.5* 29.6*   BUN mg/dL  --  16  --  12 13 12   CREATININE mg/dL  --  0.85  --  0.64 0.59 0.56*   CALCIUM mg/dL  --  7.7*  --  7.5* 6.4* 6.6*   GLUCOSE mg/dL  --  195*  --  200* 258* 277*   ALBUMIN g/dL  --   --   --   --   --  3.4*   BILIRUBIN mg/dL  --   --   --   --   --  1.4*   ALK PHOS U/L  --   --   --   --   --  54   AST (SGOT) U/L  --   --   --   --   --  15   ALT (SGPT) U/L  --   --   --   --   --  17   Estimated Creatinine Clearance: 79.6 mL/min (by C-G formula based on SCr of 0.85 mg/dL).    No results found for: \"AMMONIA\"      Blood Culture   Date Value Ref Range Status   12/02/2024 No growth at 24 hours  Preliminary " "  12/02/2024 No growth at 24 hours  Preliminary     No results found for: \"URINECX\"  No results found for: \"WOUNDCX\"  No results found for: \"STOOLCX\"    I have personally looked at the labs and they are summarized above.  ----------------------------------------------------------------------------------------------------------------------  Imaging Results (Last 24 Hours)       ** No results found for the last 24 hours. **          ----------------------------------------------------------------------------------------------------------------------  Assessment and Plan:  -Episode of severe dizziness with nausea and vomiting resolved  -Severe electrolyte derangement resolved  -Diabetes type 2 with hyperglycemia  -Episodes of hallucination and psychosis  -Incidental finding of chronic right occipital stroke  -Tobacco use disorder  -Chronic hypoxic respiratory failure  -History of hyperlipidemia  -Obesity with BMI of 35  -Atrial fibrillation paroxysmal rate controlled  -Nonobstructive carotid artery disease  -History of PE on chronic anticoagulation    Discussed with neurology, was able to gather history of thunderclap headache at home but not in the facility, no recurrence, neurology recommended to transfer the patient to [place] should she develop recurrence of thunderclap headache for concerns of possible RCVS.  In the meantime we will discontinue sertraline which she receives 25 mg daily for \"mood\".  Consult psychiatry for hallucination which apparently has been going on at home.  In the meantime fall precautions, advised family and staff to allow patient to use wheelchair to go out of the room to minimize delirium.  Delirium precautions.  Bedside commode.    Plan discussed at length with patient and son.      Disposition pending      Brigida Du MD  12/04/24  13:52 EST    Electronically signed by Brigida Du MD at 12/04/24 9355       Peter Purcell APRN at 12/04/24 0900       Attestation " signed by Nahed Munoz MD at 12/04/24 6837    I have reviewed this documentation and agree.                  Neurology Progress Note       Chief Complaint:  Headaches/Dizziness/Hallucinations    Subjective    Subjective     Subjective:  Overnight the patient became increasingly agitated, started having visual hallucinations.  Per nursing report patient was seeing a man in the room and swung her pillow at them.  Was attempting to sign herself out AMA.  Staff spoke with her son who came to help patient remain calm.  She was refusing medications, was not wearing her telemetry.  On our exam this morning she was alert, answered most orientation questions except reported the month as November.  She tells us she indeed saw a stranger in the room with her which was frightening.  Tells us she has been having visual hallucinations at home as well.  She denies any new headaches.    Review of Systems   Constitutional:  Negative for chills and fever.   Respiratory: Negative.     Cardiovascular: Negative.    Skin: Negative.    Neurological:  Negative for seizures, speech difficulty, weakness and headaches.   Psychiatric/Behavioral:  Positive for behavioral problems, confusion and hallucinations. The patient is nervous/anxious.         Objective    Objective      Temp:  [97.7 °F (36.5 °C)-98.7 °F (37.1 °C)] 98.2 °F (36.8 °C)  Heart Rate:  [] 94  Resp:  [18-20] 18  BP: (150-166)/(70-89) 155/79    Neurological Exam  Mental Status  Awake and alert. Oriented only to person, place and situation. Speech is normal. Language is fluent with no aphasia. Attention and concentration are normal.    Cranial Nerves  CN III, IV, VI: Extraocular movements intact bilaterally. Normal lids and orbits bilaterally.  CN VII: Full and symmetric facial movement.  CN XII: Tongue midline without atrophy or fasciculations.    Motor  Normal muscle bulk throughout. Normal muscle tone. No abnormal involuntary movements.  Moving extremities equally,  no focal motor deficit.    Coordination    No obvious dysmetria.    Gait    Not assessed.      Physical Exam  Vitals and nursing note reviewed.   Constitutional:       General: She is awake.      Appearance: She is obese.   HENT:      Head: Normocephalic.      Mouth/Throat:      Pharynx: Oropharynx is clear.   Eyes:      General: Lids are normal.      Extraocular Movements: Extraocular movements intact.   Cardiovascular:      Rate and Rhythm: Normal rate.   Pulmonary:      Effort: Pulmonary effort is normal. No respiratory distress.   Skin:     General: Skin is warm and dry.   Neurological:      Mental Status: She is alert.   Psychiatric:         Mood and Affect: Mood normal.         Speech: Speech normal.         Behavior: Behavior normal.         Hospital Meds:  Scheduled- amLODIPine, 5 mg, Oral, Daily  apixaban, 5 mg, Oral, Q12H  arformoterol, 15 mcg, Nebulization, BID - RT  aspirin, 81 mg, Oral, Daily  budesonide, 0.5 mg, Nebulization, BID - RT  diphenhydrAMINE, 25 mg, Intravenous, Once  insulin lispro, 2-7 Units, Subcutaneous, 4x Daily PC & at Bedtime  ipratropium, 0.5 mg, Nebulization, 4x Daily - RT  isosorbide mononitrate, 60 mg, Oral, QAM  lisinopril, 20 mg, Oral, BID  magnesium sulfate, 4 g, Intravenous, Once  metoprolol tartrate, 25 mg, Oral, Q12H  nicotine, 1 patch, Transdermal, Q24H  pantoprazole, 40 mg, Oral, Q AM  rosuvastatin, 40 mg, Oral, Daily  senna-docusate sodium, 2 tablet, Oral, BID  sodium chloride, 10 mL, Intravenous, Q12H      Infusions- sodium chloride, 100 mL/hr, Last Rate: Stopped (12/04/24 0601)       PRNs-   albuterol    senna-docusate sodium **AND** polyethylene glycol **AND** bisacodyl **AND** bisacodyl    Calcium Replacement - Follow Nurse / BPA Driven Protocol    Calcium Replacement - Follow Nurse / BPA Driven Protocol    Calcium Replacement - Follow Nurse / BPA Driven Protocol    dextrose    dextrose    glucagon (human recombinant)    Magnesium Cardiology Dose Replacement - Follow  Nurse / BPA Driven Protocol    Magnesium Standard Dose Replacement - Follow Nurse / BPA Driven Protocol    Magnesium Standard Dose Replacement - Follow Nurse / BPA Driven Protocol    nitroglycerin    Phosphorus Replacement - Follow Nurse / BPA Driven Protocol    Phosphorus Replacement - Follow Nurse / BPA Driven Protocol    Potassium Replacement - Follow Nurse / BPA Driven Protocol    Potassium Replacement - Follow Nurse / BPA Driven Protocol    sodium chloride    sodium chloride    sodium chloride    Results Review:    I reviewed the patient's new clinical results.    Imaging Results (Last 24 Hours)       Procedure Component Value Units Date/Time    MRI Angiogram Venogram Head [[id number]] Collected: 12/03/24 1035     Updated: 12/03/24 1040    Narrative:      PROCEDURE: MRI ANGIOGRAM VENOGRAM HEAD-     HISTORY: headaches, history blood clots; G93.41-Metabolic  encephalopathy; E83.42-Hypomagnesemia; E87.6-Hypokalemia;  E83.51-Hypocalcemia     PROCEDURE: MRV imaging was performed.     COMPARISON: None.     FINDINGS: The right transverse sinus is congenitally diminutive. There  is no evidence of dural venous thrombosis. The visualized cortical veins  are patent.       Impression:      Unremarkable MRV of the brain.           This report was finalized on 12/3/2024 10:38 AM by Jackie More M.D..             Results for orders placed during the hospital encounter of 12/02/24    Adult Transthoracic Echo Complete W/ Cont if Necessary Per Protocol    Interpretation Summary    Left ventricular systolic function is normal. Left ventricular ejection fraction appears to be 56 - 60%.    Left ventricular diastolic function is consistent with (grade Ia w/high LAP) impaired relaxation.    The left atrial cavity is mildly dilated.    The right atrial cavity is mildly  dilated.    Estimated right ventricular systolic pressure from tricuspid regurgitation is normal (<35 mmHg).    MRI Angiogram Venogram Head    Result Date:  12/3/2024  Unremarkable MRV of the brain.    This report was finalized on 12/3/2024 10:38 AM by Jackie More M.D..      CT Angiogram Carotids    Result Date: 12/3/2024  1. Calcific and soft plaque in the right carotid bulb and proximal ICA producing a 60% stenosis. 2. Calcific plaque in the left carotid bulb and proximal ICA producing a 40% stenosis. 3. No hemodynamically significant stenosis in the intracranial vasculature. 4. Partially imaged right pleural effusion.       This report was finalized on 12/3/2024 9:46 AM by Jackie More M.D..      CT Angiogram Head    Result Date: 12/3/2024  1. Calcific and soft plaque in the right carotid bulb and proximal ICA producing a 60% stenosis. 2. Calcific plaque in the left carotid bulb and proximal ICA producing a 40% stenosis. 3. No hemodynamically significant stenosis in the intracranial vasculature. 4. Partially imaged right pleural effusion.       This report was finalized on 12/3/2024 9:46 AM by Jackie More M.D..      MRI Brain Without Contrast    Result Date: 12/2/2024  Impression: 1. Abnormal T2 signal as described, correlate clinically to exclude osmotic demyelination syndrome. 2. Abnormal gradient echo hypointensity left lateral ventricle, compare with prior imaging or further evaluation with CT recommended.  This report was finalized on 12/2/2024 9:54 PM by Hiram Bird DO.      XR Chest 1 View    Result Date: 12/2/2024  No acute cardiopulmonary process.   This report was finalized on 12/2/2024 3:29 PM by Jackie More M.D..      CT Head Without Contrast    Result Date: 12/2/2024  No acute intracranial process.     This report was finalized on 12/2/2024 3:04 PM by Jackie More M.D..      CT Abdomen Pelvis Without Contrast    Result Date: 12/2/2024  No hydronephrosis or nephrolithiasis.     This report was finalized on 12/2/2024 2:57 PM by Jackie More M.D..        Lab Results   Component Value Date    HGBA1C 8.00 (H)  01/28/2022      Lab Results   Component Value Date    CHOL 202 (H) 01/28/2022    TRIG 112 01/28/2022    HDL 43 01/28/2022     (H) 01/28/2022      Lab Results   Component Value Date    WBC 13.02 (H) 12/04/2024    HGB 12.0 12/04/2024    HCT 38.4 12/04/2024    MCV 96.7 12/04/2024     12/04/2024      Lab Results   Component Value Date    GLUCOSE 195 (H) 12/04/2024    BUN 16 12/04/2024    CREATININE 0.85 12/04/2024    EGFRIFNONA 64 01/28/2022    BCR 18.8 12/04/2024    K 3.7 12/04/2024    CO2 25.9 12/04/2024    CALCIUM 7.7 (L) 12/04/2024    ALBUMIN 3.4 (L) 12/02/2024    AST 15 12/02/2024    ALT 17 12/02/2024      Lab Results   Component Value Date    TSH 1.890 01/28/2022     Lab Results   Component Value Date    ZXHAQGMY17 298 12/03/2024     Lab Results   Component Value Date    FOLATE 7.96 12/03/2024            Assessment/Plan     Assessment/Plan:  68-year-old female with history of DVT/PE, recently diagnosed A-fib anticoagulated with Eliquis, hypertension, obesity, tobacco abuse, diabetes, chronic occipital stroke and hyperlipidemia presented to the ED altered had been complaining of severe headache and dizziness with any body movement, also complaining of diarrhea for the past 3 to 4 days.  Had noticed frequent falling, hallucinations, running into things for the past several months.      No acute abnormalities on initial CT head.  MRI brain showed chronic infarct in right occipital lobe as well as abnormal T2 signal in central mony which spares the peripheral fibers.  CTA head and neck reported 60% stenosis in proximal right ICA, 40% in left.  Also noted on CTA were some narrowing in bilateral MCAs.  The MRV was unremarkable.     #Headache with dizziness, nausea/vomiting, improving.  Still some concern for RCVS with narrowing noted in bilateral MCAs.  Patient has not had any more headaches since admission.  MRV was unremarkable  #History of DVT/PE/A-fib anticoagulated with Eliquis  #History of right  occipital stroke  #Current everyday smoker  #Hallucinations          -Recommend repeating MRI brain in 3 months due to concern for osmotic demyelination syndrome raised by abnormal T2 signal in central mony  -Recommend inpatient psychiatry consult for hallucinations  -Normal blood pressure goals  -Recommend considering transfer for cerebral angiogram for any more thunderclap headaches symptoms  -Okay to continue Eliquis, also on aspirin for CAD  -Recommend sertraline taper as sertraline is a risk factor for RCVS  -Smoking cessation education  - Delirium precautions: Lights on, shades open from 0700 to 1900 daily with frequent reorientation. Typical risk factors for delirium include advanced age, premorbid neurological disease, significant impairment in hearing or vision, critical illness, or prolonged hospital admission.         The case was discussed with the patient, and Dr. Du.    Peter Evan Purcell, APRN  12/04/24  10:28 EST    This was an audio and video enabled telemedicine encounter.  Dr. Munoz present for encounter via telemedicine.  Verbal consent taken.      Electronically signed by Nahed Munoz MD at 12/04/24 1753          Consult Notes (last 48 hours)        João Mckinley MD at 12/04/24 1348        Consult Orders    1. Inpatient Psychiatrist Consult [[id number]] ordered by Brigida Du MD at 12/04/24 0962                 Referring Provider: Dr. Du  Reason for Consultation: visual hallucinations    Chief complaint/Focus of Exam: Visual hallucinations    Subjective .     History of present illness:    Leisa is a 68-year-old female with a history of COPD, history of PE who was admitted on 12/2/2024 for management of headaches, vomiting, dizziness and falls.  Psychiatry consulted for visual hallucinations.  Patient noted to be anxious, hallucinating, confused intermittently during hospitalization.    Patient interviewed in room today with family present with her permission.   "Patient reports headaches, nausea, vomiting, confusion, vertigo, and balance and recent increase in falls over the last 2 to 3 months.  She reports visual hallucinations over the last 1 to 2 months, noted to range from \"starburst\" or lights to distinct objects/animals/people.  Patient appeared to be responding to visual hallucinations multiple time throughout our discussion.  Patient reports she is able to tell what is a hallucination and what is not, but she still chooses to interact with them, at times speaking to them or sometimes telling them to \"go away.\"   confirmed these reports, saying that the symptoms have worsened over the last month.  Patient does not report any significant distress because of these symptoms and would not like to start medication for them.  It does not appear to be interfering with her daily life to any significant degree.  Family does appear concerned about her recent symptom burden however.  Patient denies any significant psychiatric history, unclear why she was taking sertraline.  She displayed no symptoms suggesting mood or psychotic disorder.  She does report confusion and memory struggles, more so with recent events rather than long-term memories.  She denies SI, HI.    Review of Systems  Pertinent items are noted in HPI, all other systems reviewed and negative    History  Past Medical History:   Diagnosis Date    Arthritis     COPD (chronic obstructive pulmonary disease)     Coronary artery disease s/p statnting int he past 12/22/2020    Diabetes     Diabetes mellitus     Essential hypertension 12/22/2020    Heart & renal disease, hypertensive malignant with heart failure     Heart attack     History of bronchitis     Hyperlipidemia 12/22/2020    Low back pain     Obesity (BMI 30-39.9) 12/22/2020    Pneumonia     Pulmonary embolism    ,   Past Surgical History:   Procedure Laterality Date    CHOLECYSTECTOMY      CORONARY STENT PLACEMENT  02/08/2013    3.0x23mm xieuce mid " RCA done at ; LAD 2.00iti27om Ref #[id number] done at [facility] 12/29/12    HYSTERECTOMY     ,   Family History   Problem Relation Age of Onset    Cancer Mother     No Known Problems Father    ,   Social History     Socioeconomic History    Marital status:    Tobacco Use    Smoking status: Every Day     Current packs/day: 0.50     Average packs/day: 0.5 packs/day for 30.0 years (15.0 ttl pk-yrs)     Types: Cigarettes    Smokeless tobacco: Never   Vaping Use    Vaping status: Never Used   Substance and Sexual Activity    Alcohol use: No    Drug use: No    Sexual activity: Defer     E-cigarette/Vaping    E-cigarette/Vaping Use Never User     Passive Exposure No     Counseling Given No      E-cigarette/Vaping Substances    Nicotine No     THC No     CBD No     Flavoring No      E-cigarette/Vaping Devices    Disposable No     Pre-filled or Refillable Cartridge No     Refillable Tank No     Pre-filled Pod No          ,   Medications Prior to Admission   Medication Sig Dispense Refill Last Dose/Taking    carvedilol (COREG) 25 MG tablet Take 1 tablet by mouth 2 (Two) Times a Day With Meals.   Past Week    doxycycline (VIBRAMYCIN) 100 MG capsule Take 1 capsule by mouth Every 12 (Twelve) Hours.   Past Week    apixaban (ELIQUIS) 5 MG tablet tablet Take 1 tablet by mouth Every 12 (Twelve) Hours. Indications: DVT/PE (active thrombosis) 60 tablet 0 Unknown    ezetimibe (Zetia) 10 MG tablet Take 1 tablet by mouth Daily.   Unknown    isosorbide mononitrate (IMDUR) 60 MG 24 hr tablet Take 1 tablet by mouth Every Morning.   Unknown    levalbuterol (XOPENEX) 0.63 MG/3ML nebulizer solution Take 1 ampule by nebulization Every 4 (Four) Hours As Needed for Wheezing or Shortness of Air.   Unknown    lisinopril (PRINIVIL,ZESTRIL) 20 MG tablet Take 1 tablet by mouth 2 (Two) Times a Day.   Unknown    metFORMIN (GLUCOPHAGE) 1000 MG tablet Take 1 tablet by mouth 2 (Two) Times a Day With Meals.   Unknown    nitroglycerin  (NITROSTAT) 0.4 MG SL tablet Place 1 tablet under the tongue Every 5 (Five) Minutes As Needed for Chest Pain. Take no more than 3 doses in 15 minutes.   Unknown    omeprazole (priLOSEC) 20 MG capsule Take 1 capsule by mouth 2 (Two) Times a Day.   Unknown    predniSONE (DELTASONE) 20 MG tablet Take 2 tablets by mouth Daily. For 5 days   Unknown    rosuvastatin (CRESTOR) 40 MG tablet Take 1 tablet by mouth Daily.   Unknown    sertraline (ZOLOFT) 25 MG tablet Take 1 tablet by mouth Daily.   Unknown   , Scheduled Meds:  amLODIPine, 5 mg, Oral, Daily  apixaban, 5 mg, Oral, Q12H  arformoterol, 15 mcg, Nebulization, BID - RT  aspirin, 81 mg, Oral, Daily  budesonide, 0.5 mg, Nebulization, BID - RT  diphenhydrAMINE, 25 mg, Intravenous, Once  insulin lispro, 2-7 Units, Subcutaneous, 4x Daily PC & at Bedtime  ipratropium, 0.5 mg, Nebulization, 4x Daily - RT  isosorbide mononitrate, 60 mg, Oral, QAM  lisinopril, 20 mg, Oral, BID  metoprolol tartrate, 25 mg, Oral, Q12H  nicotine, 1 patch, Transdermal, Q24H  pantoprazole, 40 mg, Oral, Q AM  rosuvastatin, 40 mg, Oral, Daily  senna-docusate sodium, 2 tablet, Oral, BID  sodium chloride, 10 mL, Intravenous, Q12H   , Continuous Infusions:   , PRN Meds:    albuterol    senna-docusate sodium **AND** polyethylene glycol **AND** bisacodyl **AND** bisacodyl    Calcium Replacement - Follow Nurse / BPA Driven Protocol    Calcium Replacement - Follow Nurse / BPA Driven Protocol    Calcium Replacement - Follow Nurse / BPA Driven Protocol    dextrose    dextrose    glucagon (human recombinant)    Magnesium Cardiology Dose Replacement - Follow Nurse / BPA Driven Protocol    Magnesium Standard Dose Replacement - Follow Nurse / BPA Driven Protocol    Magnesium Standard Dose Replacement - Follow Nurse / BPA Driven Protocol    nitroglycerin    Phosphorus Replacement - Follow Nurse / BPA Driven Protocol    Phosphorus Replacement - Follow Nurse / BPA Driven Protocol    Potassium Replacement - Follow  Nurse / BPA Driven Protocol    Potassium Replacement - Follow Nurse / BPA Driven Protocol    sodium chloride    sodium chloride    sodium chloride, and Allergies:  Codeine    Objective     Vital Signs   Temp:  [97.7 °F (36.5 °C)-98.7 °F (37.1 °C)] 98.3 °F (36.8 °C)  Heart Rate:  [] 91  Resp:  [16-18] 16  BP: (143-166)/(65-89) 143/65    Mental Status Exam:  Hygiene:   fair  Cooperation:  Cooperative  Eye Contact:  Good  Psychomotor Behavior:  Appropriate  Affect:  Full range  Hopelessness: Denies  Speech:  Normal  Thought Progress:  Goal directed and Linear  Thought Content:  Normal  Suicidal:  None  Homicidal:  None  Hallucinations:  Auditory and Visual  Delusion:  None  Memory:  Deficits  Orientation:  Person, Place, and Situation  Reliability:  fair  Insight:  Fair  Judgement:  Fair  Impulse Control:  Fair    Results Review:   I reviewed the patient's new clinical results.  I reviewed the patient's other test results and agree with the interpretation  I personally viewed and interpreted the patient's EKG/Telemetry data  Lab Results (last 24 hours)       Procedure Component Value Units Date/Time    D-dimer, Quantitative [[id number]]  (Normal) Collected: 12/04/24 1250    Specimen: Blood Updated: 12/04/24 1329     D-Dimer, Quantitative 0.60 MCGFEU/mL     Narrative:      According to the assay 's published package insert, a normal (<0.50 MCGFEU/mL) D-dimer result in conjunction with a non-high clinical probability assessment, excludes deep vein thrombosis (DVT) and pulmonary embolism (PE) with high sensitivity.    D-dimer values increase with age and this can make VTE exclusion of an older population difficult. To address this, the [employer], based on best available evidence and recent guidelines, recommends that clinicians use age-adjusted D-dimer thresholds in patients greater than 50 years of age with: a) a low probability of PE who do not meet all Pulmonary Embolism Rule Out  "Criteria, or b) in those with intermediate probability of PE.   The formula for an age-adjusted D-dimer cut-off is \"age/100\".  For example, a 60 year old patient would have an age-adjusted cut-off of 0.60 MCGFEU/mL and an 80 year old 0.80 MCGFEU/mL.    POC Glucose Once [[id number]]  (Abnormal) Collected: 12/04/24 1105    Specimen: Blood Updated: 12/04/24 1115     Glucose 269 mg/dL     POC Glucose Once [[id number]]  (Abnormal) Collected: 12/04/24 0757    Specimen: Blood Updated: 12/04/24 0811     Glucose 155 mg/dL     Magnesium [[id number]]  (Normal) Collected: 12/04/24 0448    Specimen: Blood Updated: 12/04/24 0510     Magnesium 1.7 mg/dL     Phosphorus [[id number]]  (Normal) Collected: 12/04/24 0023    Specimen: Blood Updated: 12/04/24 0150     Phosphorus 3.1 mg/dL     Basic Metabolic Panel [[id number]]  (Abnormal) Collected: 12/04/24 0023    Specimen: Blood Updated: 12/04/24 0149     Glucose 195 mg/dL      BUN 16 mg/dL      Creatinine 0.85 mg/dL      Sodium 139 mmol/L      Potassium 3.7 mmol/L      Comment: Slight hemolysis detected by analyzer. Result may be falsely elevated.        Chloride 102 mmol/L      CO2 25.9 mmol/L      Calcium 7.7 mg/dL      BUN/Creatinine Ratio 18.8     Anion Gap 11.1 mmol/L      eGFR 75.7 mL/min/1.73     Narrative:      GFR Normal >60  Chronic Kidney Disease <60  Kidney Failure <15      Magnesium [[id number]]  (Normal) Collected: 12/04/24 0023    Specimen: Blood Updated: 12/04/24 0149     Magnesium 1.9 mg/dL     CBC & Differential [[id number]]  (Abnormal) Collected: 12/04/24 0023    Specimen: Blood Updated: 12/04/24 0116    Narrative:      The following orders were created for panel order CBC & Differential.  Procedure                               Abnormality         Status                     ---------                               -----------         ------                     CBC Auto Differential[[id number]]        Abnormal            Final result                 Please view results for " these tests on the individual orders.    CBC Auto Differential [[id number]]  (Abnormal) Collected: 12/04/24 0023    Specimen: Blood Updated: 12/04/24 0116     WBC 13.02 10*3/mm3      RBC 3.97 10*6/mm3      Hemoglobin 12.0 g/dL      Hematocrit 38.4 %      MCV 96.7 fL      MCH 30.2 pg      MCHC 31.3 g/dL      RDW 13.2 %      RDW-SD 47.4 fl      MPV 10.4 fL      Platelets 237 10*3/mm3      Neutrophil % 66.9 %      Lymphocyte % 24.2 %      Monocyte % 7.0 %      Eosinophil % 0.9 %      Basophil % 0.5 %      Immature Grans % 0.5 %      Neutrophils, Absolute 8.70 10*3/mm3      Lymphocytes, Absolute 3.15 10*3/mm3      Monocytes, Absolute 0.91 10*3/mm3      Eosinophils, Absolute 0.12 10*3/mm3      Basophils, Absolute 0.07 10*3/mm3      Immature Grans, Absolute 0.07 10*3/mm3      nRBC 0.0 /100 WBC     POC Glucose Once [[id number]]  (Abnormal) Collected: 12/03/24 1927    Specimen: Blood Updated: 12/03/24 1933     Glucose 238 mg/dL     Urine Drug Screen - Urine, Clean Catch [[id number]]  (Normal) Collected: 12/03/24 1637    Specimen: Urine, Clean Catch Updated: 12/03/24 1652     THC, Screen, Urine Negative     Phencyclidine (PCP), Urine Negative     Cocaine Screen, Urine Negative     Methamphetamine, Ur Negative     Opiate Screen Negative     Amphetamine Screen, Urine Negative     Benzodiazepine Screen, Urine Negative     Tricyclic Antidepressants Screen Negative     Methadone Screen, Urine Negative     Barbiturates Screen, Urine Negative     Oxycodone Screen, Urine Negative     Buprenorphine, Screen, Urine Negative    Narrative:      Cutoff For Drugs Screened:    Amphetamines               500 ng/ml  Barbiturates               200 ng/ml  Benzodiazepines            150 ng/ml  Cocaine                    150 ng/ml  Methadone                  200 ng/ml  Opiates                    100 ng/ml  Phencyclidine               25 ng/ml  THC                         50 ng/ml  Methamphetamine            500 ng/ml  Tricyclic Antidepressants  300  ng/ml  Oxycodone                  100 ng/ml  Buprenorphine               10 ng/ml    The normal value for all drugs tested is negative. This report includes unconfirmed screening results, with the cutoff values listed, to be used for medical treatment purposes only.  Unconfirmed results must not be used for non-medical purposes such as employment or legal testing.  Clinical consideration should be applied to any drug of abuse test, particularly when unconfirmed results are used.      Fentanyl, Urine - Urine, Clean Catch [[id number]]  (Normal) Collected: 12/03/24 1637    Specimen: Urine, Clean Catch Updated: 12/03/24 1651     Fentanyl, Urine Negative    Narrative:      Negative Threshold:      Fentanyl 5 ng/mL     The normal value for the drug tested is negative. This report includes final unconfirmed screening results to be used for medical treatment purposes only. Unconfirmed results must not be used for non-medical purposes such as employment or legal testing. Clinical consideration should be applied to any drug of abuse test, particularly when unconfirmed results are used.           Blood Gas, Arterial With Co-Ox [[id number]]  (Abnormal) Collected: 12/03/24 1617    Specimen: Arterial Blood Updated: 12/03/24 1622     Site Right Brachial     Brock's Test N/A     pH, Arterial 7.482 pH units      Comment: 83 Value above reference range        pCO2, Arterial 43.5 mm Hg      pO2, Arterial 88.7 mm Hg      HCO3, Arterial 32.5 mmol/L      Comment: 83 Value above reference range        Base Excess, Arterial 8.1 mmol/L      O2 Saturation, Arterial 97.6 %      Hemoglobin, Blood Gas 12.3 g/dL      Comment: 84 Value below reference range        Hematocrit, Blood Gas 37.9 %      Comment: 84 Value below reference range        Oxyhemoglobin 95.7 %      Methemoglobin 0.20 %      Carboxyhemoglobin 1.7 %      A-a DO2 55.6 mmHg      CO2 Content 33.9 mmol/L      Barometric Pressure for Blood Gas 736 mmHg      Modality Nasal Cannula      FIO2 28 %      Flow Rate 2.0 lpm      Ventilator Mode NA     Collected by [id number]     Comment: Meter: [id number]     :  [id number]        pH, Temp Corrected --     pCO2, Temperature Corrected --     pO2, Temperature Corrected --    POC Glucose Once [[id number]]  (Abnormal) Collected: 12/03/24 1609    Specimen: Blood Updated: 12/03/24 1617     Glucose 165 mg/dL     Magnesium [[id number]]  (Normal) Collected: 12/03/24 1433    Specimen: Blood Updated: 12/03/24 1511     Magnesium 2.0 mg/dL     Potassium [[id number]]  (Normal) Collected: 12/03/24 1433    Specimen: Blood Updated: 12/03/24 1504     Potassium 3.7 mmol/L     Blood Culture - Blood, Arm, Right [[id number]]  (Normal) Collected: 12/02/24 1418    Specimen: Blood from Arm, Right Updated: 12/03/24 1430     Blood Culture No growth at 24 hours    Blood Culture - Blood, Hand, Right [[id number]]  (Normal) Collected: 12/02/24 1418    Specimen: Blood from Hand, Right Updated: 12/03/24 1430     Blood Culture No growth at 24 hours          Imaging Results (Last 24 Hours)       ** No results found for the last 24 hours. **              Assessment & Plan     Principal Problem:    Vertigo     Visual hallucinations  -Symptoms do not appear psychiatric in origin.  No significant psychiatric history reported by patient or family.  Unclear why patient was taking sertraline; agree with discontinuation.  -Visual hallucinations are generally seen in alcohol withdrawal, delirium and dementia.  Less common etiologies include migraines, seizures, autoimmune/neurodegenerative disease, stroke.  Of note, patient has an old right occipital lesion which could be playing a part.  -Would continue workup with outpatient neurology  -Would refer for ophthalmology assessment, consider Tim Bonnet syndrome    I discussed the patient's findings and my recommendations with patient, family, and consulting provider    João Mckinley MD  12/04/24  13:48 EST    Electronically signed by Bishop  João GRAY MD at 12/04/24 7514

## 2024-12-07 LAB
BACTERIA SPEC AEROBE CULT: NORMAL
BACTERIA SPEC AEROBE CULT: NORMAL

## 2024-12-10 ENCOUNTER — READMISSION MANAGEMENT (OUTPATIENT)
Dept: CALL CENTER | Facility: HOSPITAL | Age: 69
End: 2024-12-10
Payer: MEDICARE

## 2024-12-10 NOTE — OUTREACH NOTE
Medical Week 1 Survey      Flowsheet Row Responses   [facility] facility patient discharged from? [facility]   Does the patient have one of the following disease processes/diagnoses(primary or secondary)? Other   Week 1 attempt successful? No   Unsuccessful attempts Attempt 1            ANN GRAY - Registered Nurse

## 2024-12-17 ENCOUNTER — READMISSION MANAGEMENT (OUTPATIENT)
Dept: CALL CENTER | Facility: HOSPITAL | Age: 69
End: 2024-12-17
Payer: MEDICARE

## 2024-12-17 NOTE — OUTREACH NOTE
Medical Week 2 Survey      Flowsheet Row Responses   [facility] facility patient discharged from? [facility]   Does the patient have one of the following disease processes/diagnoses(primary or secondary)? Other   Week 2 attempt successful? No   Unsuccessful attempts Attempt 1            Zaynab POWELL - Registered Nurse

## 2024-12-20 ENCOUNTER — READMISSION MANAGEMENT (OUTPATIENT)
Dept: CALL CENTER | Facility: HOSPITAL | Age: 69
End: 2024-12-20
Payer: MEDICARE

## 2024-12-20 NOTE — OUTREACH NOTE
Medical Week 2 Survey      Flowsheet Row Responses   [facility] facility patient discharged from? [facility]   Does the patient have one of the following disease processes/diagnoses(primary or secondary)? Other   Week 2 attempt successful? No   Unsuccessful attempts Attempt 2   Revoke Readmitted  [patient has been admitted to ]            Sierra RASHID - Registered Nurse

## 2025-01-03 ENCOUNTER — LAB (OUTPATIENT)
Dept: LAB | Facility: HOSPITAL | Age: 70
End: 2025-01-03
Payer: MEDICARE

## 2025-01-03 ENCOUNTER — TRANSCRIBE ORDERS (OUTPATIENT)
Dept: LAB | Facility: HOSPITAL | Age: 70
End: 2025-01-03
Payer: MEDICARE

## 2025-01-03 DIAGNOSIS — N39.0 URINARY TRACT INFECTION WITHOUT HEMATURIA, SITE UNSPECIFIED: ICD-10-CM

## 2025-01-03 DIAGNOSIS — N39.0 URINARY TRACT INFECTION WITHOUT HEMATURIA, SITE UNSPECIFIED: Primary | ICD-10-CM

## 2025-01-04 ENCOUNTER — LAB (OUTPATIENT)
Dept: LAB | Facility: HOSPITAL | Age: 70
End: 2025-01-04
Payer: MEDICARE

## 2025-01-04 ENCOUNTER — TRANSCRIBE ORDERS (OUTPATIENT)
Dept: ADMINISTRATIVE | Facility: HOSPITAL | Age: 70
End: 2025-01-04
Payer: MEDICARE

## 2025-01-04 ENCOUNTER — HOSPITAL ENCOUNTER (EMERGENCY)
Facility: HOSPITAL | Age: 70
Discharge: HOME | End: 2025-01-05
Payer: MEDICARE

## 2025-01-04 DIAGNOSIS — N39.0 CHRONIC URINARY TRACT INFECTION: Primary | ICD-10-CM

## 2025-01-04 DIAGNOSIS — N12 PYELONEPHRITIS: Primary | ICD-10-CM

## 2025-01-04 LAB
ANION GAP SERPL CALCULATED.3IONS-SCNC: 13.1 MMOL/L (ref 5–15)
BACTERIA UR QL AUTO: ABNORMAL /HPF
BACTERIA UR QL AUTO: ABNORMAL /HPF
BASOPHILS # BLD AUTO: 0.05 10*3/MM3 (ref 0–0.2)
BASOPHILS NFR BLD AUTO: 0.7 % (ref 0–1.5)
BILIRUB UR QL STRIP: ABNORMAL
BILIRUB UR QL STRIP: ABNORMAL
BUN SERPL-MCNC: 15 MG/DL (ref 8–23)
BUN/CREAT SERPL: 24.6 (ref 7–25)
CALCIUM SPEC-SCNC: 8.2 MG/DL (ref 8.6–10.5)
CHLORIDE SERPL-SCNC: 104 MMOL/L (ref 98–107)
CLARITY UR: ABNORMAL
CLARITY UR: CLEAR
CO2 SERPL-SCNC: 22.9 MMOL/L (ref 22–29)
COLOR UR: ABNORMAL
COLOR UR: ABNORMAL
CREAT SERPL-MCNC: 0.61 MG/DL (ref 0.57–1)
D-LACTATE SERPL-SCNC: 1.2 MMOL/L (ref 0.5–2)
DEPRECATED RDW RBC AUTO: 49 FL (ref 37–54)
EGFRCR SERPLBLD CKD-EPI 2021: 98.1 ML/MIN/1.73
EOSINOPHIL # BLD AUTO: 0.18 10*3/MM3 (ref 0–0.4)
EOSINOPHIL NFR BLD AUTO: 2.4 % (ref 0.3–6.2)
ERYTHROCYTE [DISTWIDTH] IN BLOOD BY AUTOMATED COUNT: 12.8 % (ref 12.3–15.4)
GLUCOSE BLDC GLUCOMTR-MCNC: 85 MG/DL (ref 70–130)
GLUCOSE SERPL-MCNC: 84 MG/DL (ref 65–99)
GLUCOSE UR STRIP-MCNC: ABNORMAL MG/DL
GLUCOSE UR STRIP-MCNC: NEGATIVE MG/DL
HCT VFR BLD AUTO: 44.9 % (ref 34–46.6)
HGB BLD-MCNC: 13.1 G/DL (ref 12–15.9)
HGB UR QL STRIP.AUTO: ABNORMAL
HGB UR QL STRIP.AUTO: NEGATIVE
HOLD SPECIMEN: NORMAL
HYALINE CASTS UR QL AUTO: ABNORMAL /LPF
HYALINE CASTS UR QL AUTO: ABNORMAL /LPF
IMM GRANULOCYTES # BLD AUTO: 0.05 10*3/MM3 (ref 0–0.05)
IMM GRANULOCYTES NFR BLD AUTO: 0.7 % (ref 0–0.5)
KETONES UR QL STRIP: ABNORMAL
KETONES UR QL STRIP: NEGATIVE
LEUKOCYTE ESTERASE UR QL STRIP.AUTO: ABNORMAL
LEUKOCYTE ESTERASE UR QL STRIP.AUTO: ABNORMAL
LYMPHOCYTES # BLD AUTO: 2.73 10*3/MM3 (ref 0.7–3.1)
LYMPHOCYTES NFR BLD AUTO: 36.2 % (ref 19.6–45.3)
MCH RBC QN AUTO: 30 PG (ref 26.6–33)
MCHC RBC AUTO-ENTMCNC: 29.2 G/DL (ref 31.5–35.7)
MCV RBC AUTO: 103 FL (ref 79–97)
MONOCYTES # BLD AUTO: 0.58 10*3/MM3 (ref 0.1–0.9)
MONOCYTES NFR BLD AUTO: 7.7 % (ref 5–12)
NEUTROPHILS NFR BLD AUTO: 3.95 10*3/MM3 (ref 1.7–7)
NEUTROPHILS NFR BLD AUTO: 52.3 % (ref 42.7–76)
NITRITE UR QL STRIP: POSITIVE
NITRITE UR QL STRIP: POSITIVE
NRBC BLD AUTO-RTO: 0 /100 WBC (ref 0–0.2)
PH UR STRIP.AUTO: 5.5 [PH] (ref 5–8)
PH UR STRIP.AUTO: <=5 [PH] (ref 5–8)
PLATELET # BLD AUTO: 82 10*3/MM3 (ref 140–450)
PMV BLD AUTO: 11.3 FL (ref 6–12)
POTASSIUM SERPL-SCNC: 3.6 MMOL/L (ref 3.5–5.2)
PROT UR QL STRIP: ABNORMAL
PROT UR QL STRIP: ABNORMAL
RBC # BLD AUTO: 4.36 10*6/MM3 (ref 3.77–5.28)
RBC # UR STRIP: ABNORMAL /HPF
RBC # UR STRIP: ABNORMAL /HPF
RBC MORPH BLD: NORMAL
REF LAB TEST METHOD: ABNORMAL
REF LAB TEST METHOD: ABNORMAL
SMALL PLATELETS BLD QL SMEAR: NORMAL
SODIUM SERPL-SCNC: 140 MMOL/L (ref 136–145)
SP GR UR STRIP: 1.02 (ref 1–1.03)
SP GR UR STRIP: 1.02 (ref 1–1.03)
SQUAMOUS #/AREA URNS HPF: ABNORMAL /HPF
SQUAMOUS #/AREA URNS HPF: ABNORMAL /HPF
UROBILINOGEN UR QL STRIP: ABNORMAL
UROBILINOGEN UR QL STRIP: ABNORMAL
WBC # UR STRIP: ABNORMAL /HPF
WBC # UR STRIP: ABNORMAL /HPF
WBC NRBC COR # BLD AUTO: 7.54 10*3/MM3 (ref 3.4–10.8)

## 2025-01-04 PROCEDURE — 87086 URINE CULTURE/COLONY COUNT: CPT | Performed by: FAMILY MEDICINE

## 2025-01-04 PROCEDURE — 81001 URINALYSIS AUTO W/SCOPE: CPT

## 2025-01-04 PROCEDURE — 80048 BASIC METABOLIC PNL TOTAL CA: CPT

## 2025-01-04 PROCEDURE — 87077 CULTURE AEROBIC IDENTIFY: CPT | Performed by: FAMILY MEDICINE

## 2025-01-04 PROCEDURE — 85007 BL SMEAR W/DIFF WBC COUNT: CPT

## 2025-01-04 PROCEDURE — P9612 CATHETERIZE FOR URINE SPEC: HCPCS

## 2025-01-04 PROCEDURE — 87186 SC STD MICRODIL/AGAR DIL: CPT

## 2025-01-04 PROCEDURE — 87186 SC STD MICRODIL/AGAR DIL: CPT | Performed by: FAMILY MEDICINE

## 2025-01-04 PROCEDURE — 99283 EMERGENCY DEPT VISIT LOW MDM: CPT

## 2025-01-04 PROCEDURE — 83605 ASSAY OF LACTIC ACID: CPT

## 2025-01-04 PROCEDURE — 87086 URINE CULTURE/COLONY COUNT: CPT

## 2025-01-04 PROCEDURE — 87077 CULTURE AEROBIC IDENTIFY: CPT

## 2025-01-04 PROCEDURE — 82948 REAGENT STRIP/BLOOD GLUCOSE: CPT

## 2025-01-04 PROCEDURE — 81001 URINALYSIS AUTO W/SCOPE: CPT | Performed by: FAMILY MEDICINE

## 2025-01-04 PROCEDURE — 85025 COMPLETE CBC W/AUTO DIFF WBC: CPT

## 2025-01-04 PROCEDURE — 36415 COLL VENOUS BLD VENIPUNCTURE: CPT

## 2025-01-05 VITALS
OXYGEN SATURATION: 93 % | RESPIRATION RATE: 16 BRPM | HEART RATE: 96 BPM | DIASTOLIC BLOOD PRESSURE: 59 MMHG | HEIGHT: 67 IN | TEMPERATURE: 97.8 F | BODY MASS INDEX: 35.46 KG/M2 | WEIGHT: 225.9 LBS | SYSTOLIC BLOOD PRESSURE: 122 MMHG

## 2025-01-05 RX ORDER — LEVOFLOXACIN 750 MG/1
750 TABLET, FILM COATED ORAL ONCE
Status: COMPLETED | OUTPATIENT
Start: 2025-01-05 | End: 2025-01-05

## 2025-01-05 RX ORDER — LEVOFLOXACIN 750 MG/1
750 TABLET, FILM COATED ORAL DAILY
Qty: 6 TABLET | Refills: 0 | Status: SHIPPED | OUTPATIENT
Start: 2025-01-05 | End: 2025-01-11

## 2025-01-05 RX ADMIN — LEVOFLOXACIN 750 MG: 750 TABLET, FILM COATED ORAL at 01:09

## 2025-01-05 NOTE — DISCHARGE INSTRUCTIONS
Patient should follow-up with the PCP of the closely next week.    The patient should return to the emergency department should she worsen over the course of the next 48 hours.    The patient's complete the full course of antibiotics as prescribed.

## 2025-01-05 NOTE — ED PROVIDER NOTES
Subjective   History of Present Illness  2 days of progressively worsening altered mental status with associated dysuria, urinary frequency, urinary urgency, and urinary incontinence.  Apparently the patient has had discolored urination and foul-smelling urine as well.  Patient describes lower abdominal as well as bilateral flank pain.  The patient otherwise denies any other GI or  symptoms on further ROS.  The patient denies any cardiorespiratory or URTI symptoms at this time.      Review of Systems   Constitutional:  Positive for activity change. Negative for chills, diaphoresis and fever.   HENT: Negative.     Respiratory: Negative.     Cardiovascular: Negative.    Gastrointestinal:  Positive for abdominal pain. Negative for abdominal distention, constipation, diarrhea, nausea and vomiting.   Genitourinary:  Positive for dysuria, flank pain, frequency and urgency. Negative for difficulty urinating and hematuria.   Skin: Negative.    Neurological: Negative.        Past Medical History:   Diagnosis Date    Arthritis     COPD (chronic obstructive pulmonary disease)     Coronary artery disease s/p statnting int he past 12/22/2020    Diabetes     Diabetes mellitus     Essential hypertension 12/22/2020    Heart & renal disease, hypertensive malignant with heart failure     Heart attack     History of bronchitis     Hyperlipidemia 12/22/2020    Low back pain     Obesity (BMI 30-39.9) 12/22/2020    Pneumonia     Pulmonary embolism        Allergies   Allergen Reactions    Codeine Other (See Comments) and Unknown (See Comments)     Lethargic       Past Surgical History:   Procedure Laterality Date    CHOLECYSTECTOMY      CORONARY STENT PLACEMENT  02/08/2013    3.0x23mm xieuce mid RCA done at ; LAD 2.50eng26nq Ref #[id number] done at [facility] 12/29/12    HYSTERECTOMY         Family History   Problem Relation Age of Onset    Cancer Mother     No Known Problems Father        Social History     Socioeconomic History     Marital status:    Tobacco Use    Smoking status: Every Day     Current packs/day: 0.50     Average packs/day: 0.5 packs/day for 30.0 years (15.0 ttl pk-yrs)     Types: Cigarettes    Smokeless tobacco: Never   Vaping Use    Vaping status: Never Used   Substance and Sexual Activity    Alcohol use: No    Drug use: No    Sexual activity: Defer           Objective   Physical Exam  Vitals and nursing note reviewed.   Constitutional:       General: She is awake. She is not in acute distress.     Appearance: She is obese. She is ill-appearing. She is not toxic-appearing or diaphoretic.      Interventions: Nasal cannula in place.   HENT:      Head: Normocephalic and atraumatic.      Mouth/Throat:      Pharynx: Oropharynx is clear. No oropharyngeal exudate or posterior oropharyngeal erythema.   Eyes:      General:         Right eye: No discharge.         Left eye: No discharge.      Extraocular Movements: Extraocular movements intact.      Conjunctiva/sclera: Conjunctivae normal.      Pupils: Pupils are equal, round, and reactive to light.   Cardiovascular:      Rate and Rhythm: Normal rate and regular rhythm.      Heart sounds: Normal heart sounds. No murmur heard.     No friction rub. No gallop.   Pulmonary:      Effort: Pulmonary effort is normal. No respiratory distress.      Breath sounds: Normal breath sounds. No stridor.   Abdominal:      General: There is no distension.      Palpations: Abdomen is soft. There is no mass.      Tenderness: There is abdominal tenderness (suprapubic). There is right CVA tenderness and left CVA tenderness. There is no guarding or rebound.      Hernia: No hernia is present.   Skin:     Coloration: Skin is pale. Skin is not jaundiced.      Findings: No bruising, erythema or rash.   Neurological:      General: No focal deficit present.      Mental Status: She is alert. She is disoriented.      Cranial Nerves: No cranial nerve deficit.   Psychiatric:         Behavior: Behavior is  cooperative.         Procedures           ED Course  ED Course as of 01/05/25 0102   Sun Jan 05, 2025 0054 Platelets(!): 82 [RA]   0054 MCV(!): 103.0 [RA]   0054 Calcium(!): 8.2 [RA]   0054 Bilirubin, UA(!): Small (1+) [RA]   0054 Blood, UA(!): Small (1+) [RA]   0054 Protein, UA(!): 100 mg/dL (2+) [RA]   0054 Leukocytes, UA(!): Large (3+) [RA]   0054 Nitrite, UA(!): Positive [RA]   0054 WBC, UA(!): 11-20 [RA]   0054 Bacteria, UA(!): Trace [RA]   0054 Squamous Epithelial Cells, UA(!): 7-12 [RA]      ED Course User Index  [RA] Romel Zhong MD                                                       Medical Decision Making      Final diagnoses:   Pyelonephritis       ED Disposition  ED Disposition       ED Disposition   Discharge    Condition   Stable    Comment   --               Leisa Balderas MD  [address] [address] [phone]    Schedule an appointment as soon as possible for a visit in 3 days           Medication List        New Prescriptions      levoFLOXacin 750 MG tablet  Commonly known as: LEVAQUIN  Take 1 tablet by mouth Daily for 6 days.               Where to Get Your Medications        These medications were sent to [facility] [place] - [address] [facility] - [phone]  - [phone] FX  [address] [facility] [address] [address]      Phone: [phone]   levoFLOXacin 750 MG tablet            Romel Zhong MD  01/05/25 0102

## 2025-01-06 LAB — BACTERIA SPEC AEROBE CULT: ABNORMAL

## 2025-01-07 LAB — BACTERIA SPEC AEROBE CULT: ABNORMAL

## 2025-01-14 ENCOUNTER — APPOINTMENT (OUTPATIENT)
Dept: GENERAL RADIOLOGY | Facility: HOSPITAL | Age: 70
End: 2025-01-14
Payer: MEDICARE

## 2025-01-14 ENCOUNTER — APPOINTMENT (OUTPATIENT)
Dept: CT IMAGING | Facility: HOSPITAL | Age: 70
End: 2025-01-14
Payer: MEDICARE

## 2025-01-14 ENCOUNTER — HOSPITAL ENCOUNTER (EMERGENCY)
Facility: HOSPITAL | Age: 70
Discharge: HOME | End: 2025-01-14
Attending: STUDENT IN AN ORGANIZED HEALTH CARE EDUCATION/TRAINING PROGRAM | Admitting: STUDENT IN AN ORGANIZED HEALTH CARE EDUCATION/TRAINING PROGRAM
Payer: MEDICARE

## 2025-01-14 VITALS
WEIGHT: 200 LBS | OXYGEN SATURATION: 97 % | TEMPERATURE: 97.6 F | RESPIRATION RATE: 18 BRPM | SYSTOLIC BLOOD PRESSURE: 118 MMHG | HEIGHT: 66 IN | HEART RATE: 85 BPM | BODY MASS INDEX: 32.14 KG/M2 | DIASTOLIC BLOOD PRESSURE: 77 MMHG

## 2025-01-14 DIAGNOSIS — R41.0 INTERMITTENT CONFUSION: Primary | ICD-10-CM

## 2025-01-14 DIAGNOSIS — E83.42 HYPOMAGNESEMIA: ICD-10-CM

## 2025-01-14 LAB
ALBUMIN SERPL-MCNC: 2.8 G/DL (ref 3.5–5.2)
ALBUMIN/GLOB SERPL: 0.9 G/DL
ALP SERPL-CCNC: 42 U/L (ref 39–117)
ALT SERPL W P-5'-P-CCNC: 5 U/L (ref 1–33)
AMPHET+METHAMPHET UR QL: NEGATIVE
AMPHETAMINES UR QL: NEGATIVE
ANION GAP SERPL CALCULATED.3IONS-SCNC: 12.7 MMOL/L (ref 5–15)
AST SERPL-CCNC: 12 U/L (ref 1–32)
BARBITURATES UR QL SCN: NEGATIVE
BASOPHILS # BLD AUTO: 0.15 10*3/MM3 (ref 0–0.2)
BASOPHILS NFR BLD AUTO: 1.7 % (ref 0–1.5)
BENZODIAZ UR QL SCN: NEGATIVE
BILIRUB SERPL-MCNC: 0.3 MG/DL (ref 0–1.2)
BILIRUB UR QL STRIP: NEGATIVE
BUN SERPL-MCNC: 22 MG/DL (ref 8–23)
BUN/CREAT SERPL: 31.4 (ref 7–25)
BUPRENORPHINE SERPL-MCNC: NEGATIVE NG/ML
CALCIUM SPEC-SCNC: 8.4 MG/DL (ref 8.6–10.5)
CANNABINOIDS SERPL QL: NEGATIVE
CHLORIDE SERPL-SCNC: 107 MMOL/L (ref 98–107)
CLARITY UR: CLEAR
CO2 SERPL-SCNC: 21.3 MMOL/L (ref 22–29)
COCAINE UR QL: NEGATIVE
COLOR UR: YELLOW
CREAT SERPL-MCNC: 0.7 MG/DL (ref 0.57–1)
CRP SERPL-MCNC: <0.3 MG/DL (ref 0–0.5)
DEPRECATED RDW RBC AUTO: 49.8 FL (ref 37–54)
EGFRCR SERPLBLD CKD-EPI 2021: 94.9 ML/MIN/1.73
EOSINOPHIL # BLD AUTO: 0.11 10*3/MM3 (ref 0–0.4)
EOSINOPHIL NFR BLD AUTO: 1.2 % (ref 0.3–6.2)
ERYTHROCYTE [DISTWIDTH] IN BLOOD BY AUTOMATED COUNT: 13.3 % (ref 12.3–15.4)
ETHANOL BLD-MCNC: <10 MG/DL (ref 0–10)
ETHANOL UR QL: <0.01 %
FENTANYL UR-MCNC: NEGATIVE NG/ML
GLOBULIN UR ELPH-MCNC: 3 GM/DL
GLUCOSE SERPL-MCNC: 103 MG/DL (ref 65–99)
GLUCOSE UR STRIP-MCNC: NEGATIVE MG/DL
HCT VFR BLD AUTO: 41.6 % (ref 34–46.6)
HGB BLD-MCNC: 12.3 G/DL (ref 12–15.9)
HGB UR QL STRIP.AUTO: NEGATIVE
IMM GRANULOCYTES # BLD AUTO: 0.04 10*3/MM3 (ref 0–0.05)
IMM GRANULOCYTES NFR BLD AUTO: 0.5 % (ref 0–0.5)
KETONES UR QL STRIP: NEGATIVE
LEUKOCYTE ESTERASE UR QL STRIP.AUTO: NEGATIVE
LYMPHOCYTES # BLD AUTO: 2.69 10*3/MM3 (ref 0.7–3.1)
LYMPHOCYTES NFR BLD AUTO: 30.4 % (ref 19.6–45.3)
MAGNESIUM SERPL-MCNC: 1 MG/DL (ref 1.6–2.4)
MCH RBC QN AUTO: 29.9 PG (ref 26.6–33)
MCHC RBC AUTO-ENTMCNC: 29.6 G/DL (ref 31.5–35.7)
MCV RBC AUTO: 101 FL (ref 79–97)
METHADONE UR QL SCN: NEGATIVE
MONOCYTES # BLD AUTO: 0.96 10*3/MM3 (ref 0.1–0.9)
MONOCYTES NFR BLD AUTO: 10.8 % (ref 5–12)
NEUTROPHILS NFR BLD AUTO: 4.91 10*3/MM3 (ref 1.7–7)
NEUTROPHILS NFR BLD AUTO: 55.4 % (ref 42.7–76)
NITRITE UR QL STRIP: NEGATIVE
NRBC BLD AUTO-RTO: 0 /100 WBC (ref 0–0.2)
OPIATES UR QL: NEGATIVE
OXYCODONE UR QL SCN: NEGATIVE
PCP UR QL SCN: NEGATIVE
PH UR STRIP.AUTO: 6 [PH] (ref 5–8)
PLATELET # BLD AUTO: 78 10*3/MM3 (ref 140–450)
PMV BLD AUTO: 11.8 FL (ref 6–12)
POTASSIUM SERPL-SCNC: 3.9 MMOL/L (ref 3.5–5.2)
PROT SERPL-MCNC: 5.8 G/DL (ref 6–8.5)
PROT UR QL STRIP: ABNORMAL
RBC # BLD AUTO: 4.12 10*6/MM3 (ref 3.77–5.28)
SODIUM SERPL-SCNC: 141 MMOL/L (ref 136–145)
SP GR UR STRIP: 1.01 (ref 1–1.03)
TRICYCLICS UR QL SCN: NEGATIVE
UROBILINOGEN UR QL STRIP: ABNORMAL
VALPROATE SERPL-MCNC: 94.4 MCG/ML (ref 50–125)
WBC NRBC COR # BLD AUTO: 8.86 10*3/MM3 (ref 3.4–10.8)

## 2025-01-14 PROCEDURE — 36415 COLL VENOUS BLD VENIPUNCTURE: CPT

## 2025-01-14 PROCEDURE — 70450 CT HEAD/BRAIN W/O DYE: CPT

## 2025-01-14 PROCEDURE — 70450 CT HEAD/BRAIN W/O DYE: CPT | Performed by: RADIOLOGY

## 2025-01-14 PROCEDURE — 96365 THER/PROPH/DIAG IV INF INIT: CPT

## 2025-01-14 PROCEDURE — 99284 EMERGENCY DEPT VISIT MOD MDM: CPT

## 2025-01-14 PROCEDURE — 82077 ASSAY SPEC XCP UR&BREATH IA: CPT | Performed by: PHYSICIAN ASSISTANT

## 2025-01-14 PROCEDURE — 83735 ASSAY OF MAGNESIUM: CPT | Performed by: PHYSICIAN ASSISTANT

## 2025-01-14 PROCEDURE — 73552 X-RAY EXAM OF FEMUR 2/>: CPT

## 2025-01-14 PROCEDURE — 80053 COMPREHEN METABOLIC PANEL: CPT | Performed by: PHYSICIAN ASSISTANT

## 2025-01-14 PROCEDURE — 85025 COMPLETE CBC W/AUTO DIFF WBC: CPT | Performed by: PHYSICIAN ASSISTANT

## 2025-01-14 PROCEDURE — 86140 C-REACTIVE PROTEIN: CPT | Performed by: PHYSICIAN ASSISTANT

## 2025-01-14 PROCEDURE — 81003 URINALYSIS AUTO W/O SCOPE: CPT | Performed by: PHYSICIAN ASSISTANT

## 2025-01-14 PROCEDURE — 73502 X-RAY EXAM HIP UNI 2-3 VIEWS: CPT

## 2025-01-14 PROCEDURE — 96366 THER/PROPH/DIAG IV INF ADDON: CPT

## 2025-01-14 PROCEDURE — 73502 X-RAY EXAM HIP UNI 2-3 VIEWS: CPT | Performed by: RADIOLOGY

## 2025-01-14 PROCEDURE — 80164 ASSAY DIPROPYLACETIC ACD TOT: CPT | Performed by: PHYSICIAN ASSISTANT

## 2025-01-14 PROCEDURE — 73552 X-RAY EXAM OF FEMUR 2/>: CPT | Performed by: RADIOLOGY

## 2025-01-14 PROCEDURE — 80307 DRUG TEST PRSMV CHEM ANLYZR: CPT | Performed by: PHYSICIAN ASSISTANT

## 2025-01-14 PROCEDURE — 25010000002 MAGNESIUM SULFATE 2 GM/50ML SOLUTION: Performed by: PHYSICIAN ASSISTANT

## 2025-01-14 RX ORDER — MAGNESIUM SULFATE HEPTAHYDRATE 40 MG/ML
2 INJECTION, SOLUTION INTRAVENOUS ONCE
Status: COMPLETED | OUTPATIENT
Start: 2025-01-14 | End: 2025-01-14

## 2025-01-14 RX ORDER — SODIUM CHLORIDE 0.9 % (FLUSH) 0.9 %
10 SYRINGE (ML) INJECTION AS NEEDED
Status: DISCONTINUED | OUTPATIENT
Start: 2025-01-14 | End: 2025-01-14 | Stop reason: HOSPADM

## 2025-01-14 RX ORDER — MAGNESIUM OXIDE 400 MG/1
400 TABLET ORAL DAILY
Qty: 7 TABLET | Refills: 0 | Status: SHIPPED | OUTPATIENT
Start: 2025-01-14

## 2025-01-14 RX ADMIN — MAGNESIUM SULFATE IN WATER 2 G: 40 INJECTION, SOLUTION INTRAVENOUS at 09:21

## 2025-01-14 NOTE — NURSING NOTE
Presented pt to Dr. Pineda. He feels patient does not meet admission criteria at this time. He recommends pt follow up outpatient. New order to dc pt from a psych stand point at this time. Rbovx2.

## 2025-01-14 NOTE — NURSING NOTE
"Attempted to assess patient. Patient denies si and hi. Patient states, \"I'm not doing this. I'm not answering any questions.\" Per pt son the patient has been having hallucinations since Thanksgiving. He reports she gets confused sometimes but isn't a danger to herself or anyone else. He reports he thinks she may have dementia but has never been diagnosed.   "

## 2025-01-14 NOTE — ED PROVIDER NOTES
Subjective   History of Present Illness  This is a 69 year old female patient who presents to the ER with chief complaint of fall. Patient presents via EMS from her home and her boyfriend presents with her. PMH significant for HTN, DM not requiring insulin, HLD, GERD, COPD. Patient said she got up to go to the bathroom and slipped and fell injuring her right thigh. She denies head injury, syncope.         Review of Systems   Constitutional: Negative.  Negative for fever.   HENT: Negative.     Respiratory: Negative.     Cardiovascular: Negative.  Negative for chest pain.   Gastrointestinal: Negative.  Negative for abdominal pain.   Endocrine: Negative.    Genitourinary: Negative.  Negative for dysuria.   Musculoskeletal:  Negative for arthralgias, back pain, gait problem, joint swelling, myalgias, neck pain and neck stiffness.        Right leg injury    Skin: Negative.    Neurological: Negative.    Psychiatric/Behavioral: Negative.     All other systems reviewed and are negative.      Past Medical History:   Diagnosis Date    Arthritis     COPD (chronic obstructive pulmonary disease)     Coronary artery disease s/p statnting int he past 12/22/2020    Diabetes     Diabetes mellitus     Essential hypertension 12/22/2020    Heart & renal disease, hypertensive malignant with heart failure     Heart attack     History of bronchitis     Hyperlipidemia 12/22/2020    Low back pain     Obesity (BMI 30-39.9) 12/22/2020    Pneumonia     Pulmonary embolism        Allergies   Allergen Reactions    Codeine Other (See Comments) and Unknown (See Comments)     Lethargic       Past Surgical History:   Procedure Laterality Date    CHOLECYSTECTOMY      CORONARY STENT PLACEMENT  02/08/2013    3.0x23mm xieuce mid RCA done at ; LAD 2.03acq57hh Ref #[id number] done at [facility] 12/29/12    HYSTERECTOMY         Family History   Problem Relation Age of Onset    Cancer Mother     No Known Problems Father        Social History      Socioeconomic History    Marital status:    Tobacco Use    Smoking status: Every Day     Current packs/day: 0.50     Average packs/day: 0.5 packs/day for 30.0 years (15.0 ttl pk-yrs)     Types: Cigarettes    Smokeless tobacco: Never   Vaping Use    Vaping status: Never Used   Substance and Sexual Activity    Alcohol use: No    Drug use: No    Sexual activity: Defer           Objective   Physical Exam  Vitals and nursing note reviewed.   Constitutional:       General: She is not in acute distress.     Appearance: She is well-developed. She is not diaphoretic.   HENT:      Head: Normocephalic and atraumatic.      Right Ear: External ear normal.      Left Ear: External ear normal.      Nose: Nose normal.   Eyes:      Conjunctiva/sclera: Conjunctivae normal.      Pupils: Pupils are equal, round, and reactive to light.   Neck:      Vascular: No JVD.      Trachea: No tracheal deviation.   Cardiovascular:      Rate and Rhythm: Normal rate and regular rhythm.      Heart sounds: Normal heart sounds. No murmur heard.  Pulmonary:      Effort: Pulmonary effort is normal. No respiratory distress.      Breath sounds: Normal breath sounds. No wheezing.   Abdominal:      General: Bowel sounds are normal.      Palpations: Abdomen is soft.      Tenderness: There is no abdominal tenderness.   Musculoskeletal:         General: Tenderness and signs of injury present. No deformity. Normal range of motion.      Cervical back: Normal range of motion and neck supple.      Comments: Right leg skin intact with no bruising, abrasion or edema. Tenderness to palpation noted. Neurovascular status and sensation RLE intact.    Skin:     General: Skin is warm and dry.      Coloration: Skin is not pale.      Findings: No erythema or rash.   Neurological:      Mental Status: She is alert and oriented to person, place, and time.      Cranial Nerves: No cranial nerve deficit.   Psychiatric:         Behavior: Behavior normal.         Thought  Content: Thought content normal.         Procedures       Results for orders placed or performed during the hospital encounter of 01/14/25   Comprehensive Metabolic Panel    Collection Time: 01/14/25  8:12 AM    Specimen: Blood   Result Value Ref Range    Glucose 103 (H) 65 - 99 mg/dL    BUN 22 8 - 23 mg/dL    Creatinine 0.70 0.57 - 1.00 mg/dL    Sodium 141 136 - 145 mmol/L    Potassium 3.9 3.5 - 5.2 mmol/L    Chloride 107 98 - 107 mmol/L    CO2 21.3 (L) 22.0 - 29.0 mmol/L    Calcium 8.4 (L) 8.6 - 10.5 mg/dL    Total Protein 5.8 (L) 6.0 - 8.5 g/dL    Albumin 2.8 (L) 3.5 - 5.2 g/dL    ALT (SGPT) 5 1 - 33 U/L    AST (SGOT) 12 1 - 32 U/L    Alkaline Phosphatase 42 39 - 117 U/L    Total Bilirubin 0.3 0.0 - 1.2 mg/dL    Globulin 3.0 gm/dL    A/G Ratio 0.9 g/dL    BUN/Creatinine Ratio 31.4 (H) 7.0 - 25.0    Anion Gap 12.7 5.0 - 15.0 mmol/L    eGFR 94.9 >60.0 mL/min/1.73   C-reactive Protein    Collection Time: 01/14/25  8:12 AM    Specimen: Blood   Result Value Ref Range    C-Reactive Protein <0.30 0.00 - 0.50 mg/dL   Ethanol    Collection Time: 01/14/25  8:12 AM    Specimen: Blood   Result Value Ref Range    Ethanol <10 0 - 10 mg/dL    Ethanol % <0.010 %   Magnesium    Collection Time: 01/14/25  8:12 AM    Specimen: Blood   Result Value Ref Range    Magnesium 1.0 (L) 1.6 - 2.4 mg/dL   CBC Auto Differential    Collection Time: 01/14/25  8:12 AM    Specimen: Blood   Result Value Ref Range    WBC 8.86 3.40 - 10.80 10*3/mm3    RBC 4.12 3.77 - 5.28 10*6/mm3    Hemoglobin 12.3 12.0 - 15.9 g/dL    Hematocrit 41.6 34.0 - 46.6 %    .0 (H) 79.0 - 97.0 fL    MCH 29.9 26.6 - 33.0 pg    MCHC 29.6 (L) 31.5 - 35.7 g/dL    RDW 13.3 12.3 - 15.4 %    RDW-SD 49.8 37.0 - 54.0 fl    MPV 11.8 6.0 - 12.0 fL    Platelets 78 (L) 140 - 450 10*3/mm3    Neutrophil % 55.4 42.7 - 76.0 %    Lymphocyte % 30.4 19.6 - 45.3 %    Monocyte % 10.8 5.0 - 12.0 %    Eosinophil % 1.2 0.3 - 6.2 %    Basophil % 1.7 (H) 0.0 - 1.5 %    Immature Grans % 0.5  0.0 - 0.5 %    Neutrophils, Absolute 4.91 1.70 - 7.00 10*3/mm3    Lymphocytes, Absolute 2.69 0.70 - 3.10 10*3/mm3    Monocytes, Absolute 0.96 (H) 0.10 - 0.90 10*3/mm3    Eosinophils, Absolute 0.11 0.00 - 0.40 10*3/mm3    Basophils, Absolute 0.15 0.00 - 0.20 10*3/mm3    Immature Grans, Absolute 0.04 0.00 - 0.05 10*3/mm3    nRBC 0.0 0.0 - 0.2 /100 WBC   Valproic Acid Level, Total    Collection Time: 01/14/25  8:12 AM    Specimen: Blood   Result Value Ref Range    Valproic Acid 94.4 50.0 - 125.0 mcg/mL   Urinalysis With Microscopic If Indicated (No Culture) - Urine, Clean Catch    Collection Time: 01/14/25  9:29 AM    Specimen: Urine, Clean Catch   Result Value Ref Range    Color, UA Yellow Yellow, Straw    Appearance, UA Clear Clear    pH, UA 6.0 5.0 - 8.0    Specific Gravity, UA 1.014 1.005 - 1.030    Glucose, UA Negative Negative    Ketones, UA Negative Negative    Bilirubin, UA Negative Negative    Blood, UA Negative Negative    Protein, UA Trace (A) Negative    Leuk Esterase, UA Negative Negative    Nitrite, UA Negative Negative    Urobilinogen, UA 0.2 E.U./dL 0.2 - 1.0 E.U./dL   Urine Drug Screen - Urine, Clean Catch    Collection Time: 01/14/25  9:29 AM    Specimen: Urine, Clean Catch   Result Value Ref Range    THC, Screen, Urine Negative Negative    Phencyclidine (PCP), Urine Negative Negative    Cocaine Screen, Urine Negative Negative    Methamphetamine, Ur Negative Negative    Opiate Screen Negative Negative    Amphetamine Screen, Urine Negative Negative    Benzodiazepine Screen, Urine Negative Negative    Tricyclic Antidepressants Screen Negative Negative    Methadone Screen, Urine Negative Negative    Barbiturates Screen, Urine Negative Negative    Oxycodone Screen, Urine Negative Negative    Buprenorphine, Screen, Urine Negative Negative   Fentanyl, Urine - Urine, Clean Catch    Collection Time: 01/14/25  9:29 AM    Specimen: Urine, Clean Catch   Result Value Ref Range    Fentanyl, Urine Negative Negative           ED Course  ED Course as of 01/14/25 1158   Tue Jan 14, 2025   0704 EMS staff came out of the room and reported to me that the patient communicated to them that she is scared of her boyfriend, whom she lives with and whom is in the room with her, and that is why she called EMS and told them to come get her.  [MM]   0809 Signed out to Nelly Ramirez PA-C at shift change [MM]   0846 Magnesium(!): 1.0  Replacement ordered [AH]   0846 XR Femur 2 View Right  FINDINGS:    Bones/joints:  Unremarkable.  No acute fracture.  No dislocation.    Soft tissues:  Unremarkable.     IMPRESSION:    No acute findings in the right femur.   [AH]   0846 XR Hip With or Without Pelvis 2 - 3 View Right  FINDINGS:    Bones/joints:  Unremarkable.  No acute fracture.  No dislocation.    Soft tissues:  Unremarkable.     IMPRESSION:    No acute findings in the right hip.   [AH]   0851 CT Head Without Contrast  FINDINGS:    BRAIN: There is what appears to be encephalomalacia of right posterior  parietal/occipital region but stable. No parenchymal mass, hemorrhage,  or midline shift    VENTRICLES:  Unremarkable.  No ventriculomegaly.       BONES/JOINTS:  Unremarkable.  No acute fracture.       SOFT TISSUES:  Unremarkable.       SINUSES:  no air fluid levels       MASTOID AIR CELLS:  Unremarkable as visualized.  No mastoid effusion.        IMPRESSION:     1. No parenchymal mass, hemorrhage, or midline shift.   [AH]   0957 Intake nurse to assess [AH]      ED Course User Index  [AH] Nelly Ramirez PA  [MM] Jessica Eagle PA                                                       Medical Decision Making  69-year-old female who presents to the ED today due to a fall.  She then told EMS that the reason she called for an ambulance was because she was scared of her boyfriend.  Patient's x-ray and CT of the head were unremarkable.  Magnesium was low at 1.0.  She was given IV replacement.  Urinalysis, urine drug screen and ethanol level were negative.   The family reports that she has had increased confusion over the last couple months.  She did have a stroke several months ago.  The patient's son reports that she has had intermittent visual hallucinations but is aware of it.  She also has not been sleeping well.  The patient's son, daughter-in-law and significant other are all present.  The patient's son and daughter-in-law are both adamant that the patient significant other is not abusive.  The patient was evaluated by our intake department and she did not meet criteria for admission.  Case management also evaluated the patient, please see her note for further details.  The patient's primary care provider did recently prescribe her Seroquel for nighttime and the patient's family will pick that up from the pharmacy today.  A prescription for magnesium pills were sent to the pharmacy and she was instructed to follow closely with her primary care provider.  She will return to the ED if symptoms change or worsen.    Problems Addressed:  Hypomagnesemia: complicated acute illness or injury  Intermittent confusion: complicated acute illness or injury    Amount and/or Complexity of Data Reviewed  Labs: ordered. Decision-making details documented in ED Course.  Radiology: ordered. Decision-making details documented in ED Course.    Risk  OTC drugs.  Prescription drug management.        Final diagnoses:   Intermittent confusion   Hypomagnesemia       ED Disposition  ED Disposition       ED Disposition   Discharge    Condition   Stable    Comment   --               Leisa Balderas MD  [address] [facility] [address] [phone]    Call in 1 day           Medication List        New Prescriptions      magnesium oxide 400 MG tablet  Commonly known as: MAG-OX  Take 1 tablet by mouth Daily.               Where to Get Your Medications        These [facility] [address] [facility] [phone] [place] [phone] [address]  [facility] [address]      Phone: [phone]   magnesium oxide 400 MG tablet            Nelly Ramirez, PA  01/14/25 5538

## 2025-01-14 NOTE — CASE MANAGEMENT/SOCIAL WORK
Case Management/Social Work    Patient Name:  Leisa Vasquez  YOB: 1955  MRN: [id number]  Admit Date:  1/14/2025        Received consult for safe disposition planning for patient. Spoke with patient at bedside who stated she was ready to go home and felt safe at home. Also spoke with family in hallway privately. They stated patient has been getting increased confusion in the last few months. Patient lives with S/O and her son and DIL live in front of them. Patients son has no concerns regarding patients safety. Patients DIL and son help with care of patient. Patients DIL stated that patients PCP sent her some new medication Seroquel in yesterday to take at night to see if rest may help with confusion. Family stated that patient is not sleeping of a night and has increases confusion. Patients family and S/O agreeable with discharge home.     Electronically signed by:  Shakila Sanchez  01/14/25 11:02 EST

## 2025-01-14 NOTE — DISCHARGE INSTRUCTIONS
Follow-up with your family doctor in the office at the next available appointment.  Return to the ED at any time if symptoms change or worsen.  Continue your home medications as prescribed.  A prescription for a weeks worth of magnesium pills were sent to your pharmacy.  You can start those this evening.  Please have your magnesium level rechecked when you follow-up with your doctor as it was low today.

## 2025-01-16 ENCOUNTER — LAB (OUTPATIENT)
Dept: LAB | Facility: HOSPITAL | Age: 70
End: 2025-01-16
Payer: MEDICARE

## 2025-01-16 ENCOUNTER — TRANSCRIBE ORDERS (OUTPATIENT)
Dept: ADMINISTRATIVE | Facility: HOSPITAL | Age: 70
End: 2025-01-16
Payer: MEDICARE

## 2025-01-16 DIAGNOSIS — E11.22 TYPE 2 DIABETES MELLITUS WITH ESRD (END-STAGE RENAL DISEASE): ICD-10-CM

## 2025-01-16 DIAGNOSIS — I10 ESSENTIAL HYPERTENSION, MALIGNANT: ICD-10-CM

## 2025-01-16 DIAGNOSIS — E11.22 TYPE 2 DIABETES MELLITUS WITH ESRD (END-STAGE RENAL DISEASE): Primary | ICD-10-CM

## 2025-01-16 DIAGNOSIS — N18.6 TYPE 2 DIABETES MELLITUS WITH ESRD (END-STAGE RENAL DISEASE): Primary | ICD-10-CM

## 2025-01-16 DIAGNOSIS — N18.6 TYPE 2 DIABETES MELLITUS WITH ESRD (END-STAGE RENAL DISEASE): ICD-10-CM

## 2025-01-16 LAB
BASOPHILS # BLD AUTO: 0.15 10*3/MM3 (ref 0–0.2)
BASOPHILS NFR BLD AUTO: 1.9 % (ref 0–1.5)
DEPRECATED RDW RBC AUTO: 48.6 FL (ref 37–54)
EOSINOPHIL # BLD AUTO: 0.14 10*3/MM3 (ref 0–0.4)
EOSINOPHIL NFR BLD AUTO: 1.8 % (ref 0.3–6.2)
ERYTHROCYTE [DISTWIDTH] IN BLOOD BY AUTOMATED COUNT: 13.8 % (ref 12.3–15.4)
HCT VFR BLD AUTO: 39 % (ref 34–46.6)
HGB BLD-MCNC: 12 G/DL (ref 12–15.9)
IMM GRANULOCYTES # BLD AUTO: 0.03 10*3/MM3 (ref 0–0.05)
IMM GRANULOCYTES NFR BLD AUTO: 0.4 % (ref 0–0.5)
LYMPHOCYTES # BLD AUTO: 2.82 10*3/MM3 (ref 0.7–3.1)
LYMPHOCYTES NFR BLD AUTO: 35.8 % (ref 19.6–45.3)
MCH RBC QN AUTO: 29.3 PG (ref 26.6–33)
MCHC RBC AUTO-ENTMCNC: 30.8 G/DL (ref 31.5–35.7)
MCV RBC AUTO: 95.4 FL (ref 79–97)
MONOCYTES # BLD AUTO: 0.97 10*3/MM3 (ref 0.1–0.9)
MONOCYTES NFR BLD AUTO: 12.3 % (ref 5–12)
NEUTROPHILS NFR BLD AUTO: 3.76 10*3/MM3 (ref 1.7–7)
NEUTROPHILS NFR BLD AUTO: 47.8 % (ref 42.7–76)
NRBC BLD AUTO-RTO: 0 /100 WBC (ref 0–0.2)
PLATELET # BLD AUTO: 102 10*3/MM3 (ref 140–450)
PMV BLD AUTO: 12.7 FL (ref 6–12)
RBC # BLD AUTO: 4.09 10*6/MM3 (ref 3.77–5.28)
WBC NRBC COR # BLD AUTO: 7.87 10*3/MM3 (ref 3.4–10.8)

## 2025-01-16 PROCEDURE — 85025 COMPLETE CBC W/AUTO DIFF WBC: CPT

## 2025-01-16 PROCEDURE — 36415 COLL VENOUS BLD VENIPUNCTURE: CPT

## 2025-01-16 PROCEDURE — 80053 COMPREHEN METABOLIC PANEL: CPT

## 2025-01-17 LAB
ALBUMIN SERPL-MCNC: 3 G/DL (ref 3.5–5.2)
ALBUMIN/GLOB SERPL: 1.2 G/DL
ALP SERPL-CCNC: 45 U/L (ref 39–117)
ALT SERPL W P-5'-P-CCNC: 7 U/L (ref 1–33)
ANION GAP SERPL CALCULATED.3IONS-SCNC: 12 MMOL/L (ref 5–15)
AST SERPL-CCNC: 14 U/L (ref 1–32)
BILIRUB SERPL-MCNC: 0.2 MG/DL (ref 0–1.2)
BUN SERPL-MCNC: 22 MG/DL (ref 8–23)
BUN/CREAT SERPL: 27.5 (ref 7–25)
CALCIUM SPEC-SCNC: 8.4 MG/DL (ref 8.6–10.5)
CHLORIDE SERPL-SCNC: 107 MMOL/L (ref 98–107)
CO2 SERPL-SCNC: 22 MMOL/L (ref 22–29)
CREAT SERPL-MCNC: 0.8 MG/DL (ref 0.57–1)
EGFRCR SERPLBLD CKD-EPI 2021: 80.9 ML/MIN/1.73
GLOBULIN UR ELPH-MCNC: 2.5 GM/DL
GLUCOSE SERPL-MCNC: 110 MG/DL (ref 65–99)
POTASSIUM SERPL-SCNC: 3.6 MMOL/L (ref 3.5–5.2)
PROT SERPL-MCNC: 5.5 G/DL (ref 6–8.5)
SODIUM SERPL-SCNC: 141 MMOL/L (ref 136–145)

## 2025-01-21 ENCOUNTER — HOSPITAL ENCOUNTER (EMERGENCY)
Facility: HOSPITAL | Age: 70
Discharge: HOME | End: 2025-01-21
Attending: EMERGENCY MEDICINE | Admitting: EMERGENCY MEDICINE
Payer: MEDICARE

## 2025-01-21 ENCOUNTER — APPOINTMENT (OUTPATIENT)
Dept: GENERAL RADIOLOGY | Facility: HOSPITAL | Age: 70
End: 2025-01-21
Payer: MEDICARE

## 2025-01-21 ENCOUNTER — APPOINTMENT (OUTPATIENT)
Dept: CT IMAGING | Facility: HOSPITAL | Age: 70
End: 2025-01-21
Payer: MEDICARE

## 2025-01-21 VITALS
BODY MASS INDEX: 32.14 KG/M2 | SYSTOLIC BLOOD PRESSURE: 113 MMHG | DIASTOLIC BLOOD PRESSURE: 90 MMHG | HEART RATE: 76 BPM | RESPIRATION RATE: 20 BRPM | HEIGHT: 66 IN | WEIGHT: 199.96 LBS | TEMPERATURE: 98.7 F | OXYGEN SATURATION: 95 %

## 2025-01-21 DIAGNOSIS — N39.0 ACUTE LOWER UTI: Primary | ICD-10-CM

## 2025-01-21 LAB
ALBUMIN SERPL-MCNC: 3.1 G/DL (ref 3.5–5.2)
ALBUMIN/GLOB SERPL: 0.9 G/DL
ALP SERPL-CCNC: 49 U/L (ref 39–117)
ALT SERPL W P-5'-P-CCNC: 6 U/L (ref 1–33)
ANION GAP SERPL CALCULATED.3IONS-SCNC: 12 MMOL/L (ref 5–15)
AST SERPL-CCNC: 17 U/L (ref 1–32)
BACTERIA UR QL AUTO: ABNORMAL /HPF
BASOPHILS # BLD AUTO: 0.13 10*3/MM3 (ref 0–0.2)
BASOPHILS NFR BLD AUTO: 1.7 % (ref 0–1.5)
BILIRUB SERPL-MCNC: 0.3 MG/DL (ref 0–1.2)
BILIRUB UR QL STRIP: NEGATIVE
BUN SERPL-MCNC: 19 MG/DL (ref 8–23)
BUN/CREAT SERPL: 22.1 (ref 7–25)
CALCIUM SPEC-SCNC: 8.9 MG/DL (ref 8.6–10.5)
CHLORIDE SERPL-SCNC: 103 MMOL/L (ref 98–107)
CLARITY UR: CLEAR
CO2 SERPL-SCNC: 25 MMOL/L (ref 22–29)
COLOR UR: ABNORMAL
CREAT SERPL-MCNC: 0.86 MG/DL (ref 0.57–1)
CRP SERPL-MCNC: 1.75 MG/DL (ref 0–0.5)
D-LACTATE SERPL-SCNC: 1.4 MMOL/L (ref 0.5–2)
DEPRECATED RDW RBC AUTO: 48.3 FL (ref 37–54)
EGFRCR SERPLBLD CKD-EPI 2021: 74.2 ML/MIN/1.73
EOSINOPHIL # BLD AUTO: 0.1 10*3/MM3 (ref 0–0.4)
EOSINOPHIL NFR BLD AUTO: 1.3 % (ref 0.3–6.2)
ERYTHROCYTE [DISTWIDTH] IN BLOOD BY AUTOMATED COUNT: 14.2 % (ref 12.3–15.4)
GEN 5 1HR TROPONIN T REFLEX: 22 NG/L
GLOBULIN UR ELPH-MCNC: 3.4 GM/DL
GLUCOSE SERPL-MCNC: 131 MG/DL (ref 65–99)
GLUCOSE UR STRIP-MCNC: NEGATIVE MG/DL
HCT VFR BLD AUTO: 39.2 % (ref 34–46.6)
HGB BLD-MCNC: 12.3 G/DL (ref 12–15.9)
HGB UR QL STRIP.AUTO: ABNORMAL
HYALINE CASTS UR QL AUTO: ABNORMAL /LPF
IMM GRANULOCYTES # BLD AUTO: 0.04 10*3/MM3 (ref 0–0.05)
IMM GRANULOCYTES NFR BLD AUTO: 0.5 % (ref 0–0.5)
KETONES UR QL STRIP: ABNORMAL
LEUKOCYTE ESTERASE UR QL STRIP.AUTO: NEGATIVE
LYMPHOCYTES # BLD AUTO: 2.16 10*3/MM3 (ref 0.7–3.1)
LYMPHOCYTES NFR BLD AUTO: 29 % (ref 19.6–45.3)
MCH RBC QN AUTO: 29.7 PG (ref 26.6–33)
MCHC RBC AUTO-ENTMCNC: 31.4 G/DL (ref 31.5–35.7)
MCV RBC AUTO: 94.7 FL (ref 79–97)
MONOCYTES # BLD AUTO: 0.88 10*3/MM3 (ref 0.1–0.9)
MONOCYTES NFR BLD AUTO: 11.8 % (ref 5–12)
NEUTROPHILS NFR BLD AUTO: 4.15 10*3/MM3 (ref 1.7–7)
NEUTROPHILS NFR BLD AUTO: 55.7 % (ref 42.7–76)
NITRITE UR QL STRIP: NEGATIVE
NRBC BLD AUTO-RTO: 0 /100 WBC (ref 0–0.2)
NT-PROBNP SERPL-MCNC: 1733 PG/ML (ref 0–900)
PH UR STRIP.AUTO: 6 [PH] (ref 5–8)
PLATELET # BLD AUTO: 90 10*3/MM3 (ref 140–450)
PMV BLD AUTO: 12.5 FL (ref 6–12)
POTASSIUM SERPL-SCNC: 3.5 MMOL/L (ref 3.5–5.2)
PROT SERPL-MCNC: 6.5 G/DL (ref 6–8.5)
PROT UR QL STRIP: ABNORMAL
RBC # BLD AUTO: 4.14 10*6/MM3 (ref 3.77–5.28)
RBC # UR STRIP: ABNORMAL /HPF
REF LAB TEST METHOD: ABNORMAL
SODIUM SERPL-SCNC: 140 MMOL/L (ref 136–145)
SP GR UR STRIP: 1.02 (ref 1–1.03)
SQUAMOUS #/AREA URNS HPF: ABNORMAL /HPF
TROPONIN T % DELTA: -15
TROPONIN T NUMERIC DELTA: -4 NG/L
TROPONIN T SERPL HS-MCNC: 26 NG/L
UROBILINOGEN UR QL STRIP: ABNORMAL
WBC # UR STRIP: ABNORMAL /HPF
WBC NRBC COR # BLD AUTO: 7.46 10*3/MM3 (ref 3.4–10.8)

## 2025-01-21 PROCEDURE — 85025 COMPLETE CBC W/AUTO DIFF WBC: CPT | Performed by: NURSE PRACTITIONER

## 2025-01-21 PROCEDURE — 71045 X-RAY EXAM CHEST 1 VIEW: CPT | Performed by: RADIOLOGY

## 2025-01-21 PROCEDURE — 71045 X-RAY EXAM CHEST 1 VIEW: CPT

## 2025-01-21 PROCEDURE — 87040 BLOOD CULTURE FOR BACTERIA: CPT | Performed by: NURSE PRACTITIONER

## 2025-01-21 PROCEDURE — 25810000003 SODIUM CHLORIDE 0.9 % SOLUTION: Performed by: NURSE PRACTITIONER

## 2025-01-21 PROCEDURE — P9612 CATHETERIZE FOR URINE SPEC: HCPCS

## 2025-01-21 PROCEDURE — 84484 ASSAY OF TROPONIN QUANT: CPT | Performed by: NURSE PRACTITIONER

## 2025-01-21 PROCEDURE — 93005 ELECTROCARDIOGRAM TRACING: CPT | Performed by: NURSE PRACTITIONER

## 2025-01-21 PROCEDURE — 99284 EMERGENCY DEPT VISIT MOD MDM: CPT

## 2025-01-21 PROCEDURE — 86140 C-REACTIVE PROTEIN: CPT | Performed by: NURSE PRACTITIONER

## 2025-01-21 PROCEDURE — 70450 CT HEAD/BRAIN W/O DYE: CPT

## 2025-01-21 PROCEDURE — 83605 ASSAY OF LACTIC ACID: CPT | Performed by: NURSE PRACTITIONER

## 2025-01-21 PROCEDURE — 81001 URINALYSIS AUTO W/SCOPE: CPT | Performed by: NURSE PRACTITIONER

## 2025-01-21 PROCEDURE — 70450 CT HEAD/BRAIN W/O DYE: CPT | Performed by: RADIOLOGY

## 2025-01-21 PROCEDURE — 36415 COLL VENOUS BLD VENIPUNCTURE: CPT | Performed by: NURSE PRACTITIONER

## 2025-01-21 PROCEDURE — 80053 COMPREHEN METABOLIC PANEL: CPT | Performed by: NURSE PRACTITIONER

## 2025-01-21 PROCEDURE — 83880 ASSAY OF NATRIURETIC PEPTIDE: CPT | Performed by: NURSE PRACTITIONER

## 2025-01-21 RX ORDER — FLUCONAZOLE 200 MG/1
200 TABLET ORAL DAILY
Qty: 3 TABLET | Refills: 0 | Status: SHIPPED | OUTPATIENT
Start: 2025-01-21

## 2025-01-21 RX ORDER — CEFDINIR 300 MG/1
300 CAPSULE ORAL DAILY
Qty: 14 CAPSULE | Refills: 0 | Status: SHIPPED | OUTPATIENT
Start: 2025-01-21

## 2025-01-21 RX ORDER — NYSTATIN 100000 [USP'U]/G
POWDER TOPICAL ONCE
Status: COMPLETED | OUTPATIENT
Start: 2025-01-21 | End: 2025-01-21

## 2025-01-21 RX ORDER — SODIUM CHLORIDE 0.9 % (FLUSH) 0.9 %
10 SYRINGE (ML) INJECTION AS NEEDED
Status: DISCONTINUED | OUTPATIENT
Start: 2025-01-21 | End: 2025-01-21 | Stop reason: HOSPADM

## 2025-01-21 RX ADMIN — SODIUM CHLORIDE 500 ML: 9 INJECTION, SOLUTION INTRAVENOUS at 13:01

## 2025-01-21 RX ADMIN — NYSTATIN: 100000 POWDER TOPICAL at 12:44

## 2025-01-21 NOTE — ED PROVIDER NOTES
Subjective   History of Present Illness  Patient is a 69-year-old female brought in by family for worsening mental status.  Patient's daughter-in-law present states that patient has had worsening dementia for some time but states that the last couple days she has noticed more pronounced change.  She reports that only recently in the last 2 to 3 weeks have her and her  been more involved in the patient's care as she was living with her boyfriend who she felt was not adequately caring for the patient.  She reports she is.  She may have a UTI.  She does report that they have been working with the  and has gotten patient combinations at home.  She reports that they do not want patient placed in a nursing home.        Review of Systems   Constitutional: Negative.    HENT: Negative.     Eyes: Negative.    Respiratory: Negative.     Cardiovascular: Negative.    Gastrointestinal: Negative.    Endocrine: Negative.    Genitourinary: Negative.    Musculoskeletal: Negative.    Skin: Negative.    Allergic/Immunologic: Negative.    Neurological: Negative.    Hematological: Negative.    Psychiatric/Behavioral: Negative.         Past Medical History:   Diagnosis Date    Arthritis     COPD (chronic obstructive pulmonary disease)     Coronary artery disease s/p statnting int he past 12/22/2020    Diabetes     Diabetes mellitus     Essential hypertension 12/22/2020    Heart & renal disease, hypertensive malignant with heart failure     Heart attack     History of bronchitis     Hyperlipidemia 12/22/2020    Low back pain     Obesity (BMI 30-39.9) 12/22/2020    Pneumonia     Pulmonary embolism        Allergies   Allergen Reactions    Codeine Other (See Comments) and Unknown (See Comments)     Lethargic       Past Surgical History:   Procedure Laterality Date    CHOLECYSTECTOMY      CORONARY STENT PLACEMENT  02/08/2013    3.0x23mm xieuce mid RCA done at ; LAD 2.91ghw35bq Ref #[id number] done at [facility]  12/29/12    HYSTERECTOMY         Family History   Problem Relation Age of Onset    Cancer Mother     No Known Problems Father        Social History     Socioeconomic History    Marital status:    Tobacco Use    Smoking status: Every Day     Current packs/day: 0.50     Average packs/day: 0.5 packs/day for 30.0 years (15.0 ttl pk-yrs)     Types: Cigarettes    Smokeless tobacco: Never   Vaping Use    Vaping status: Never Used   Substance and Sexual Activity    Alcohol use: No    Drug use: No    Sexual activity: Defer           Objective   Physical Exam  Vitals and nursing note reviewed.   Constitutional:       Appearance: She is well-developed.   HENT:      Head: Normocephalic.      Right Ear: External ear normal.      Left Ear: External ear normal.   Eyes:      Conjunctiva/sclera: Conjunctivae normal.      Pupils: Pupils are equal, round, and reactive to light.   Cardiovascular:      Rate and Rhythm: Normal rate and regular rhythm.      Heart sounds: Normal heart sounds.   Pulmonary:      Effort: Pulmonary effort is normal.      Breath sounds: Normal breath sounds.   Abdominal:      General: Bowel sounds are normal.      Palpations: Abdomen is soft.   Musculoskeletal:         General: Normal range of motion.      Cervical back: Normal range of motion and neck supple.   Skin:     General: Skin is warm and dry.      Capillary Refill: Capillary refill takes less than 2 seconds.   Neurological:      Mental Status: She is alert and oriented to person, place, and time.   Psychiatric:         Behavior: Behavior normal.         Thought Content: Thought content normal.         Procedures       Results for orders placed or performed during the hospital encounter of 01/21/25   ECG 12 Lead Altered Mental Status    Collection Time: 01/21/25 12:21 PM   Result Value Ref Range    QT Interval 314 ms    QTC Interval 386 ms   BNP    Collection Time: 01/21/25 12:22 PM    Specimen: Arm, Left; Blood   Result Value Ref Range     proBNP 1,733.0 (H) 0.0 - 900.0 pg/mL   High Sensitivity Troponin T    Collection Time: 01/21/25 12:22 PM    Specimen: Arm, Left; Blood   Result Value Ref Range    HS Troponin T 26 (H) <14 ng/L   Lactic Acid, Plasma    Collection Time: 01/21/25 12:22 PM    Specimen: Arm, Left; Blood   Result Value Ref Range    Lactate 1.4 0.5 - 2.0 mmol/L   Comprehensive Metabolic Panel    Collection Time: 01/21/25 12:23 PM    Specimen: Arm, Left; Blood   Result Value Ref Range    Glucose 131 (H) 65 - 99 mg/dL    BUN 19 8 - 23 mg/dL    Creatinine 0.86 0.57 - 1.00 mg/dL    Sodium 140 136 - 145 mmol/L    Potassium 3.5 3.5 - 5.2 mmol/L    Chloride 103 98 - 107 mmol/L    CO2 25.0 22.0 - 29.0 mmol/L    Calcium 8.9 8.6 - 10.5 mg/dL    Total Protein 6.5 6.0 - 8.5 g/dL    Albumin 3.1 (L) 3.5 - 5.2 g/dL    ALT (SGPT) 6 1 - 33 U/L    AST (SGOT) 17 1 - 32 U/L    Alkaline Phosphatase 49 39 - 117 U/L    Total Bilirubin 0.3 0.0 - 1.2 mg/dL    Globulin 3.4 gm/dL    A/G Ratio 0.9 g/dL    BUN/Creatinine Ratio 22.1 7.0 - 25.0    Anion Gap 12.0 5.0 - 15.0 mmol/L    eGFR 74.2 >60.0 mL/min/1.73   C-reactive Protein    Collection Time: 01/21/25 12:23 PM    Specimen: Arm, Left; Blood   Result Value Ref Range    C-Reactive Protein 1.75 (H) 0.00 - 0.50 mg/dL   CBC Auto Differential    Collection Time: 01/21/25 12:23 PM    Specimen: Blood   Result Value Ref Range    WBC 7.46 3.40 - 10.80 10*3/mm3    RBC 4.14 3.77 - 5.28 10*6/mm3    Hemoglobin 12.3 12.0 - 15.9 g/dL    Hematocrit 39.2 34.0 - 46.6 %    MCV 94.7 79.0 - 97.0 fL    MCH 29.7 26.6 - 33.0 pg    MCHC 31.4 (L) 31.5 - 35.7 g/dL    RDW 14.2 12.3 - 15.4 %    RDW-SD 48.3 37.0 - 54.0 fl    MPV 12.5 (H) 6.0 - 12.0 fL    Platelets 90 (L) 140 - 450 10*3/mm3    Neutrophil % 55.7 42.7 - 76.0 %    Lymphocyte % 29.0 19.6 - 45.3 %    Monocyte % 11.8 5.0 - 12.0 %    Eosinophil % 1.3 0.3 - 6.2 %    Basophil % 1.7 (H) 0.0 - 1.5 %    Immature Grans % 0.5 0.0 - 0.5 %    Neutrophils, Absolute 4.15 1.70 - 7.00 10*3/mm3     Lymphocytes, Absolute 2.16 0.70 - 3.10 10*3/mm3    Monocytes, Absolute 0.88 0.10 - 0.90 10*3/mm3    Eosinophils, Absolute 0.10 0.00 - 0.40 10*3/mm3    Basophils, Absolute 0.13 0.00 - 0.20 10*3/mm3    Immature Grans, Absolute 0.04 0.00 - 0.05 10*3/mm3    nRBC 0.0 0.0 - 0.2 /100 WBC   Urinalysis With Culture If Indicated - Urine, Catheter In/Out    Collection Time: 01/21/25 12:38 PM    Specimen: Urine, Catheter In/Out   Result Value Ref Range    Color, UA Dark Yellow (A) Yellow, Straw    Appearance, UA Clear Clear    pH, UA 6.0 5.0 - 8.0    Specific Gravity, UA 1.020 1.005 - 1.030    Glucose, UA Negative Negative    Ketones, UA Trace (A) Negative    Bilirubin, UA Negative Negative    Blood, UA Trace (A) Negative    Protein,  mg/dL (2+) (A) Negative    Leuk Esterase, UA Negative Negative    Nitrite, UA Negative Negative    Urobilinogen, UA 1.0 E.U./dL 0.2 - 1.0 E.U./dL   Urinalysis, Microscopic Only - Urine, Catheter In/Out    Collection Time: 01/21/25 12:38 PM    Specimen: Urine, Catheter In/Out   Result Value Ref Range    RBC, UA 6-10 (A) None Seen, 0-2 /HPF    WBC, UA 3-5 (A) None Seen, 0-2 /HPF    Bacteria, UA None Seen None Seen /HPF    Squamous Epithelial Cells, UA 7-12 (A) None Seen, 0-2 /HPF    Hyaline Casts, UA 3-6 None Seen /LPF    Methodology Manual Light Microscopy    High Sensitivity Troponin T 1Hr    Collection Time: 01/21/25  2:15 PM    Specimen: Blood   Result Value Ref Range    HS Troponin T 22 (H) <14 ng/L    Troponin T Numeric Delta -4 ng/L    Troponin T % Delta -15 Abnormal if >/= 20%     Electronically signed by BENJI Schultz, 01/21/25, 2:04 PM EST.        ED Course  ED Course as of 01/21/25 1545   Tue Jan 21, 2025   1304 ECG 12 Lead Altered Mental Status  Vent. Rate :  91 BPM     Atrial Rate :  96 BPM     P-R Int :   * ms          QRS Dur :  74 ms      QT Int : 314 ms       P-R-T Axes :   *  28  83 degrees    QTcB Int : 386 ms     Atrial fibrillation  Low voltage QRS  Nonspecific ST  and T wave abnormality  Abnormal ECG  When compared with ECG of 03-Dec-2024 06:43,  Criteria for Septal infarct are no longer present  Nonspecific T wave abnormality, worse in Lateral leads  QT has shortened      [ES]   1404 Endorsed to Dr. Fowler [CHERIE]      ED Course User Index  [ES] Cristino Frost MD  [CHERIE] Alex Hinojosa, BENJI                                                       Medical Decision Making      Final diagnoses:   Acute lower UTI       ED Disposition  ED Disposition       ED Disposition   Discharge    Condition   Stable    Comment   --               Leisa aBlderas MD   [address] [facility] [address] [phone]    Schedule an appointment as soon as possible for a visit   If symptoms worsen         Medication List      No changes were made to your prescriptions during this visit.            Xavier Fowler MD  01/21/25 2476

## 2025-01-21 NOTE — ED NOTES
Patient has been changed and is clean and dry. Clean linens have been applied and external catheter has been applied.

## 2025-01-23 LAB
QT INTERVAL: 314 MS
QTC INTERVAL: 386 MS

## 2025-01-26 LAB
BACTERIA SPEC AEROBE CULT: NORMAL
BACTERIA SPEC AEROBE CULT: NORMAL

## 2025-01-27 ENCOUNTER — TRANSCRIBE ORDERS (OUTPATIENT)
Dept: ADMINISTRATIVE | Facility: HOSPITAL | Age: 70
End: 2025-01-27
Payer: MEDICARE

## 2025-01-27 ENCOUNTER — LAB (OUTPATIENT)
Dept: LAB | Facility: HOSPITAL | Age: 70
End: 2025-01-27
Payer: MEDICARE

## 2025-01-27 DIAGNOSIS — E83.42 HYPOMAGNESEMIA: ICD-10-CM

## 2025-01-27 DIAGNOSIS — E83.42 HYPOMAGNESEMIA: Primary | ICD-10-CM

## 2025-01-27 LAB — MAGNESIUM SERPL-MCNC: 1.3 MG/DL (ref 1.6–2.4)

## 2025-01-27 PROCEDURE — 83735 ASSAY OF MAGNESIUM: CPT

## 2025-01-27 PROCEDURE — 36415 COLL VENOUS BLD VENIPUNCTURE: CPT

## 2025-02-22 DIAGNOSIS — Z51.5 END OF LIFE CARE: ICD-10-CM

## 2025-02-22 DIAGNOSIS — I61.9: Primary | ICD-10-CM

## 2025-02-22 RX ORDER — LORAZEPAM 2 MG/ML
1 CONCENTRATE ORAL EVERY 4 HOURS PRN
Qty: 30 ML | Refills: 0 | Status: SHIPPED | OUTPATIENT
Start: 2025-02-22

## 2025-02-22 RX ORDER — MORPHINE SULFATE 100 MG/5ML
5 SOLUTION ORAL EVERY 4 HOURS PRN
Qty: 30 ML | Refills: 0 | Status: SHIPPED | OUTPATIENT
Start: 2025-02-22

## 2025-03-04 DIAGNOSIS — R30.0 DYSURIA: Primary | ICD-10-CM

## 2025-03-04 RX ORDER — NITROFURANTOIN 25; 75 MG/1; MG/1
100 CAPSULE ORAL 2 TIMES DAILY
Qty: 10 CAPSULE | Refills: 0 | Status: SHIPPED | OUTPATIENT
Start: 2025-03-04

## 2025-04-02 DIAGNOSIS — Z51.5 END OF LIFE CARE: ICD-10-CM

## 2025-04-02 DIAGNOSIS — I61.9: ICD-10-CM

## 2025-04-02 RX ORDER — LORAZEPAM 2 MG/ML
1 CONCENTRATE ORAL EVERY 4 HOURS PRN
Qty: 30 ML | Refills: 0 | Status: SHIPPED | OUTPATIENT
Start: 2025-04-02

## 2025-04-07 ENCOUNTER — TELEPHONE (OUTPATIENT)
Dept: FAMILY MEDICINE CLINIC | Age: 70
End: 2025-04-07
Payer: MEDICARE

## 2025-04-07 DIAGNOSIS — F32.A ANXIETY AND DEPRESSION: ICD-10-CM

## 2025-04-07 DIAGNOSIS — I61.9: Primary | ICD-10-CM

## 2025-04-07 DIAGNOSIS — F41.9 ANXIETY AND DEPRESSION: ICD-10-CM

## 2025-04-07 RX ORDER — ESCITALOPRAM OXALATE 5 MG/1
5 TABLET ORAL DAILY
Qty: 14 TABLET | Refills: 1 | Status: SHIPPED | OUTPATIENT
Start: 2025-04-07

## 2025-04-07 NOTE — TELEPHONE ENCOUNTER
"Spoke with hospice nurse, Iliana  Patient having anxiety and depression resulting in behaviors that family characterizes as \"mean\"    Will start patient on Lexapro 5 mg daily if we can get patient to consent.  She had declined treatment last week.    mas  "

## 2025-04-11 DIAGNOSIS — I61.9: ICD-10-CM

## 2025-04-11 DIAGNOSIS — Z51.5 END OF LIFE CARE: ICD-10-CM

## 2025-04-11 RX ORDER — MORPHINE SULFATE 100 MG/5ML
5 SOLUTION ORAL EVERY 4 HOURS PRN
Qty: 30 ML | Refills: 0 | Status: SHIPPED | OUTPATIENT
Start: 2025-04-11

## 2025-04-25 DIAGNOSIS — I61.9: ICD-10-CM

## 2025-04-25 DIAGNOSIS — Z51.5 END OF LIFE CARE: ICD-10-CM

## 2025-04-25 RX ORDER — BISACODYL 10 MG
10 SUPPOSITORY, RECTAL RECTAL DAILY
Qty: 10 SUPPOSITORY | Refills: 0 | Status: SHIPPED | OUTPATIENT
Start: 2025-04-25

## 2025-04-25 RX ORDER — LORAZEPAM 2 MG/ML
1 CONCENTRATE ORAL EVERY 4 HOURS PRN
Qty: 30 ML | Refills: 0 | Status: SHIPPED | OUTPATIENT
Start: 2025-04-25